# Patient Record
Sex: MALE | Race: WHITE | NOT HISPANIC OR LATINO | Employment: FULL TIME | URBAN - METROPOLITAN AREA
[De-identification: names, ages, dates, MRNs, and addresses within clinical notes are randomized per-mention and may not be internally consistent; named-entity substitution may affect disease eponyms.]

---

## 2019-03-30 ENCOUNTER — OFFICE VISIT (OUTPATIENT)
Dept: URGENT CARE | Facility: URGENT CARE | Age: 49
End: 2019-03-30
Payer: COMMERCIAL

## 2019-03-30 VITALS
SYSTOLIC BLOOD PRESSURE: 119 MMHG | OXYGEN SATURATION: 94 % | HEART RATE: 71 BPM | DIASTOLIC BLOOD PRESSURE: 83 MMHG | WEIGHT: 208.6 LBS | TEMPERATURE: 98.2 F | BODY MASS INDEX: 28.29 KG/M2

## 2019-03-30 DIAGNOSIS — H92.03 OTALGIA OF BOTH EARS: Primary | ICD-10-CM

## 2019-03-30 DIAGNOSIS — H57.89 EYE IRRITATION: ICD-10-CM

## 2019-03-30 PROCEDURE — 99203 OFFICE O/P NEW LOW 30 MIN: CPT | Performed by: INTERNAL MEDICINE

## 2019-03-30 RX ORDER — OLOPATADINE HYDROCHLORIDE 1 MG/ML
1 SOLUTION/ DROPS OPHTHALMIC 2 TIMES DAILY
Qty: 5 ML | Refills: 0 | Status: SHIPPED | OUTPATIENT
Start: 2019-03-30 | End: 2019-07-18

## 2019-03-30 RX ORDER — CETIRIZINE HYDROCHLORIDE 10 MG/1
10 TABLET ORAL DAILY
Qty: 30 TABLET | Refills: 0 | Status: SHIPPED | OUTPATIENT
Start: 2019-03-30 | End: 2019-08-28

## 2019-03-30 NOTE — PROGRESS NOTES
SUBJECTIVE:  Fortino Polo is an 48 year old male who presents for ears feeling full.  Started yesterday.  Typically that feeling comes from ear wax so he used his ear wax drops which helped.  Then early this morning both ears felt full and painful.  Also started having some eye watering.  Eyes are a little swollen.  No cough or runny nose.  About 9 days ago had some vomiting and diarrhea for a couple days, which resolved after about 2 days.  No recent travel.  No known exposures.  Tylenol helped pain for a while this morning.      PMH:   has a past medical history of Condyloma, Dyslipidemia, and Pilonidal cyst.  Patient Active Problem List   Diagnosis     NO ACTIVE PROBLEMS     CARDIOVASCULAR SCREENING; LDL GOAL LESS THAN 160     Social History     Socioeconomic History     Marital status:      Spouse name: Not on file     Number of children: Not on file     Years of education: Not on file     Highest education level: Not on file   Occupational History     Not on file   Social Needs     Financial resource strain: Not on file     Food insecurity:     Worry: Not on file     Inability: Not on file     Transportation needs:     Medical: Not on file     Non-medical: Not on file   Tobacco Use     Smoking status: Never Smoker     Smokeless tobacco: Former User   Substance and Sexual Activity     Alcohol use: Yes     Comment: weekends beer     Drug use: No     Sexual activity: Yes     Partners: Female   Lifestyle     Physical activity:     Days per week: Not on file     Minutes per session: Not on file     Stress: Not on file   Relationships     Social connections:     Talks on phone: Not on file     Gets together: Not on file     Attends Bahai service: Not on file     Active member of club or organization: Not on file     Attends meetings of clubs or organizations: Not on file     Relationship status: Not on file     Intimate partner violence:     Fear of current or ex partner: Not on file     Emotionally  abused: Not on file     Physically abused: Not on file     Forced sexual activity: Not on file   Other Topics Concern     Parent/sibling w/ CABG, MI or angioplasty before 65F 55M? Not Asked      Service No     Blood Transfusions No     Caffeine Concern No     Occupational Exposure No     Hobby Hazards No     Sleep Concern No     Stress Concern No     Weight Concern No     Special Diet No     Back Care No     Exercise Yes     Bike Helmet Yes     Seat Belt Yes     Self-Exams Yes   Social History Narrative     Not on file     Family History   Problem Relation Age of Onset     Hypertension Mother      Heart Disease Father      C.A.D. Father      Genitourinary Problems Sister         liver      Cerebrovascular Disease Father        ALLERGIES:  Nkda [no known drug allergies]    Current Outpatient Medications   Medication     NO ACTIVE MEDICATIONS     No current facility-administered medications for this visit.          ROS:  ROS is done and is negative for general/constitutional, eye, ENT, Respiratory, cardiovascular, GI, , Skin, musculoskeletal except as noted elsewhere.  All other review of systems negative except as noted elsewhere.      OBJECTIVE:  /83   Pulse 71   Temp 98.2  F (36.8  C) (Oral)   Wt 94.6 kg (208 lb 9.6 oz)   SpO2 94%   BMI 28.29 kg/m    GENERAL APPEARANCE: Alert, in no acute distress  EYES: normal.  Bilateral upper eyelids with trace edema, no erythema or tenderness or increased warmth.  EARS: External ears normal. Canals clear. TMs with moderate clear effusions, no erythema.  NOSE:normal  OROPHARYNX:normal  NECK:No adenopathy,masses or thyromegaly  RESP: normal and clear to auscultation  CV:regular rate and rhythm and no murmurs, clicks, or gallops  ABDOMEN: Abdomen soft, non-tender. BS normal. No masses, organomegaly  SKIN: no ulcers, lesions or rash  MUSCULOSKELETAL:Musculoskeletal normal      RESULTS  Results for orders placed or performed in visit on 10/29/13   Lipid panel  reflex to direct LDL   Result Value Ref Range    Cholesterol 216 (H) 0 - 200 mg/dL    Triglycerides 122 0 - 150 mg/dL    HDL Cholesterol 32 (L) 40 - 110 mg/dL    LDL Cholesterol Calculated 159 (H) 0 - 129 mg/dL    VLDL-Cholesterol 24 0 - 30 mg/dL    Cholesterol/HDL Ratio 6.7 (H) 0.0 - 5.0   Basic metabolic panel   Result Value Ref Range    Sodium 141 133 - 144 mmol/L    Potassium 4.4 3.4 - 5.3 mmol/L    Chloride 100 94 - 109 mmol/L    Carbon Dioxide 28 20 - 32 mmol/L    Anion Gap 12 6 - 17 mmol/L    Glucose 97 60 - 99 mg/dL    Urea Nitrogen 12 5 - 24 mg/dL    Creatinine 1.35 (H) 0.66 - 1.25 mg/dL    GFR Estimate 58 (L) >60 mL/min/1.7m2    GFR Estimate If Black 70 >60 mL/min/1.7m2    Calcium 9.5 8.5 - 10.4 mg/dL   .  No results found for this or any previous visit (from the past 48 hour(s)).    ASSESSMENT/PLAN:    ASSESSMENT / PLAN:  (H92.03) Otalgia of both ears  (primary encounter diagnosis)  Comment: currently appears to be due to some increased fluid; eg eustacian tube dysfunction or allergies or viral etiology  Plan: cetirizine (ZYRTEC) 10 MG tablet        Reviewed medication instructions and side effects. Follow up if experiences side effects.. Also may use an otc decongestant prn.  I reviewed supportive care, otc meds to use if needed, expected course, and signs of concern.  Follow up as needed or if he does not improve within 7 day(s) or if worsens in any way.  Reviewed red flag symptoms and is to go to the ER if experiences any of these.    (H57.89) Eye irritation  Comment: suspect some allergies  Plan: olopatadine (PATANOL) 0.1 % ophthalmic solution        Reviewed medication instructions and side effects. Follow up if experiences side effects.. I reviewed supportive care, otc meds to use if needed, expected course, and signs of concern.  Follow up as needed or if he does not improve within 7 day(s) or if worsens in any way.  Reviewed red flag symptoms and is to go to the ER if experiences any of  these.      See [facility] for orders, medications, letters, patient instructions    Charlotte Don M.D.

## 2019-04-08 ENCOUNTER — OFFICE VISIT (OUTPATIENT)
Dept: FAMILY MEDICINE | Facility: CLINIC | Age: 49
End: 2019-04-08
Payer: COMMERCIAL

## 2019-04-08 VITALS
TEMPERATURE: 97.9 F | SYSTOLIC BLOOD PRESSURE: 128 MMHG | WEIGHT: 210 LBS | RESPIRATION RATE: 16 BRPM | BODY MASS INDEX: 28.48 KG/M2 | DIASTOLIC BLOOD PRESSURE: 83 MMHG | HEART RATE: 82 BPM | OXYGEN SATURATION: 97 %

## 2019-04-08 DIAGNOSIS — H66.002 ACUTE SUPPURATIVE OTITIS MEDIA OF LEFT EAR WITHOUT SPONTANEOUS RUPTURE OF TYMPANIC MEMBRANE, RECURRENCE NOT SPECIFIED: Primary | ICD-10-CM

## 2019-04-08 DIAGNOSIS — J30.2 SEASONAL ALLERGIC RHINITIS, UNSPECIFIED TRIGGER: ICD-10-CM

## 2019-04-08 PROCEDURE — 99214 OFFICE O/P EST MOD 30 MIN: CPT | Performed by: NURSE PRACTITIONER

## 2019-04-08 RX ORDER — FLUTICASONE PROPIONATE 50 MCG
1 SPRAY, SUSPENSION (ML) NASAL DAILY
Qty: 9.9 ML | Refills: 0 | Status: SHIPPED | OUTPATIENT
Start: 2019-04-08 | End: 2019-07-18

## 2019-04-08 ASSESSMENT — PAIN SCALES - GENERAL: PAINLEVEL: MILD PAIN (2)

## 2019-04-08 NOTE — PROGRESS NOTES
SUBJECTIVE:                                                    Fortino Polo is a 48 year old male who presents to clinic today for the following health issues:    RESPIRATORY SYMPTOMS      Duration: 1 week    Description  ear pain bilateral PLUGGED, left ear pain and drainage now new sx    Severity: mild-moderate    Accompanying signs and symptoms: None    History (predisposing factors):  none    Precipitating or alleviating factors: None    Therapies tried and outcome:  antihistamine  Was seen when this started 3/30 told not infected and to use sudafed but that is not helping  Not having a lot of sinus sx anymore just clear thin rhinorrhea has allergies    Problem list and histories reviewed & adjusted, as indicated.  Additional history: as documented      ROS:  Constitutional, HEENT, cardiovascular, pulmonary, gi and gu systems are negative, except as otherwise noted.    OBJECTIVE:     /83   Pulse 82   Temp 97.9  F (36.6  C) (Oral)   Resp 16   Wt 95.3 kg (210 lb)   SpO2 97%   BMI 28.48 kg/m    Body mass index is 28.48 kg/m .  GENERAL:  alert and no distress  EYES: Eyes grossly normal to inspection, PERRL and conjunctivae and sclerae normal  HENT: Right ear canals and TM's normal, left ear erythematous bulging, effusion  nose and mouth without ulcers or lesions  NECK: no adenopathy, no asymmetry, masses, or scars and thyroid normal to palpation  RESP: lungs clear to auscultation - no rales, rhonchi or wheezes  CV: regular rate and rhythm, normal S1 S2, no S3 or S4, no murmur, click or rub, no peripheral edema and peripheral pulses strong  SKIN: no suspicious lesions or rashes  PSYCH: mentation appears normal, affect normal/bright      ASSESSMENT/PLAN:       '(H66.002) Acute suppurative otitis media of left ear without spontaneous rupture of tympanic membrane, recurrence not specified  (primary encounter diagnosis)    Plan:  amoxicillin-clavulanate (AUGMENTIN) 875-125 MG  Tablet BID X 10  days  Ibuprofen or tylenol for pain      (J30.2) Seasonal allergic rhinitis, unspecified trigger  Plan: continue zyrtec  fluticasone (FLONASE) 50 MCG/ACT nasal spray,          Monitor symptoms, call or rtc if worsening or not improving    NEELAM Arias [facility]

## 2019-07-18 ENCOUNTER — OFFICE VISIT (OUTPATIENT)
Dept: FAMILY MEDICINE | Facility: CLINIC | Age: 49
End: 2019-07-18
Payer: COMMERCIAL

## 2019-07-18 ENCOUNTER — TELEPHONE (OUTPATIENT)
Dept: FAMILY MEDICINE | Facility: CLINIC | Age: 49
End: 2019-07-18

## 2019-07-18 ENCOUNTER — ANCILLARY PROCEDURE (OUTPATIENT)
Dept: GENERAL RADIOLOGY | Facility: CLINIC | Age: 49
End: 2019-07-18
Attending: PHYSICIAN ASSISTANT
Payer: COMMERCIAL

## 2019-07-18 VITALS
RESPIRATION RATE: 12 BRPM | DIASTOLIC BLOOD PRESSURE: 76 MMHG | OXYGEN SATURATION: 94 % | WEIGHT: 213 LBS | BODY MASS INDEX: 28.85 KG/M2 | HEIGHT: 72 IN | TEMPERATURE: 98.1 F | SYSTOLIC BLOOD PRESSURE: 122 MMHG | HEART RATE: 95 BPM

## 2019-07-18 DIAGNOSIS — Z11.4 SCREENING FOR HIV (HUMAN IMMUNODEFICIENCY VIRUS): ICD-10-CM

## 2019-07-18 DIAGNOSIS — R53.83 FATIGUE, UNSPECIFIED TYPE: ICD-10-CM

## 2019-07-18 DIAGNOSIS — R10.84 ABDOMINAL PAIN, GENERALIZED: ICD-10-CM

## 2019-07-18 DIAGNOSIS — J18.9 PNEUMONIA OF RIGHT MIDDLE LOBE DUE TO INFECTIOUS ORGANISM: ICD-10-CM

## 2019-07-18 DIAGNOSIS — R10.9 FLANK PAIN: Primary | ICD-10-CM

## 2019-07-18 DIAGNOSIS — R05.9 COUGH: ICD-10-CM

## 2019-07-18 DIAGNOSIS — Z13.220 SCREENING FOR HYPERLIPIDEMIA: ICD-10-CM

## 2019-07-18 LAB
ALBUMIN SERPL-MCNC: 3.5 G/DL (ref 3.4–5)
ALBUMIN UR-MCNC: NEGATIVE MG/DL
ALP SERPL-CCNC: 61 U/L (ref 40–150)
ALT SERPL W P-5'-P-CCNC: 43 U/L (ref 0–70)
ANION GAP SERPL CALCULATED.3IONS-SCNC: 7 MMOL/L (ref 3–14)
APPEARANCE UR: CLEAR
AST SERPL W P-5'-P-CCNC: 30 U/L (ref 0–45)
BASOPHILS # BLD AUTO: 0 10E9/L (ref 0–0.2)
BASOPHILS NFR BLD AUTO: 0.6 %
BILIRUB SERPL-MCNC: 0.3 MG/DL (ref 0.2–1.3)
BILIRUB UR QL STRIP: NEGATIVE
BUN SERPL-MCNC: 10 MG/DL (ref 7–30)
CALCIUM SERPL-MCNC: 9 MG/DL (ref 8.5–10.1)
CHLORIDE SERPL-SCNC: 100 MMOL/L (ref 94–109)
CHOLEST SERPL-MCNC: 201 MG/DL
CO2 SERPL-SCNC: 27 MMOL/L (ref 20–32)
COLOR UR AUTO: YELLOW
CREAT SERPL-MCNC: 1.13 MG/DL (ref 0.66–1.25)
DIFFERENTIAL METHOD BLD: ABNORMAL
EOSINOPHIL # BLD AUTO: 0.1 10E9/L (ref 0–0.7)
EOSINOPHIL NFR BLD AUTO: 1.1 %
ERYTHROCYTE [DISTWIDTH] IN BLOOD BY AUTOMATED COUNT: 12.4 % (ref 10–15)
GFR SERPL CREATININE-BSD FRML MDRD: 76 ML/MIN/{1.73_M2}
GLUCOSE SERPL-MCNC: 99 MG/DL (ref 70–99)
GLUCOSE UR STRIP-MCNC: NEGATIVE MG/DL
HCT VFR BLD AUTO: 36.9 % (ref 40–53)
HDLC SERPL-MCNC: 32 MG/DL
HETEROPH AB SER QL: NEGATIVE
HGB BLD-MCNC: 12.6 G/DL (ref 13.3–17.7)
HGB UR QL STRIP: ABNORMAL
HIV 1+2 AB+HIV1 P24 AG SERPL QL IA: NONREACTIVE
KETONES UR STRIP-MCNC: NEGATIVE MG/DL
LDLC SERPL CALC-MCNC: 149 MG/DL
LEUKOCYTE ESTERASE UR QL STRIP: NEGATIVE
LIPASE SERPL-CCNC: 68 U/L (ref 73–393)
LYMPHOCYTES # BLD AUTO: 1.4 10E9/L (ref 0.8–5.3)
LYMPHOCYTES NFR BLD AUTO: 19 %
MCH RBC QN AUTO: 31.7 PG (ref 26.5–33)
MCHC RBC AUTO-ENTMCNC: 34.1 G/DL (ref 31.5–36.5)
MCV RBC AUTO: 93 FL (ref 78–100)
MONOCYTES # BLD AUTO: 1 10E9/L (ref 0–1.3)
MONOCYTES NFR BLD AUTO: 13.5 %
NEUTROPHILS # BLD AUTO: 4.7 10E9/L (ref 1.6–8.3)
NEUTROPHILS NFR BLD AUTO: 65.8 %
NITRATE UR QL: NEGATIVE
NONHDLC SERPL-MCNC: 169 MG/DL
PH UR STRIP: 7 PH (ref 5–7)
PLATELET # BLD AUTO: 271 10E9/L (ref 150–450)
POTASSIUM SERPL-SCNC: 3.9 MMOL/L (ref 3.4–5.3)
PROT SERPL-MCNC: 7.9 G/DL (ref 6.8–8.8)
RADIOLOGIST FLAGS: ABNORMAL
RBC # BLD AUTO: 3.98 10E12/L (ref 4.4–5.9)
RBC #/AREA URNS AUTO: NORMAL /HPF
SODIUM SERPL-SCNC: 134 MMOL/L (ref 133–144)
SOURCE: ABNORMAL
SP GR UR STRIP: 1.01 (ref 1–1.03)
TRIGL SERPL-MCNC: 100 MG/DL
TSH SERPL DL<=0.005 MIU/L-ACNC: 0.77 MU/L (ref 0.4–4)
UROBILINOGEN UR STRIP-ACNC: 0.2 EU/DL (ref 0.2–1)
WBC # BLD AUTO: 7.1 10E9/L (ref 4–11)
WBC #/AREA URNS AUTO: NORMAL /HPF

## 2019-07-18 PROCEDURE — 81001 URINALYSIS AUTO W/SCOPE: CPT | Performed by: PHYSICIAN ASSISTANT

## 2019-07-18 PROCEDURE — 36415 COLL VENOUS BLD VENIPUNCTURE: CPT | Performed by: PHYSICIAN ASSISTANT

## 2019-07-18 PROCEDURE — 85025 COMPLETE CBC W/AUTO DIFF WBC: CPT | Performed by: PHYSICIAN ASSISTANT

## 2019-07-18 PROCEDURE — 71046 X-RAY EXAM CHEST 2 VIEWS: CPT

## 2019-07-18 PROCEDURE — 83690 ASSAY OF LIPASE: CPT | Performed by: PHYSICIAN ASSISTANT

## 2019-07-18 PROCEDURE — 87389 HIV-1 AG W/HIV-1&-2 AB AG IA: CPT | Performed by: PHYSICIAN ASSISTANT

## 2019-07-18 PROCEDURE — 99214 OFFICE O/P EST MOD 30 MIN: CPT | Performed by: PHYSICIAN ASSISTANT

## 2019-07-18 PROCEDURE — 84443 ASSAY THYROID STIM HORMONE: CPT | Performed by: PHYSICIAN ASSISTANT

## 2019-07-18 PROCEDURE — 80053 COMPREHEN METABOLIC PANEL: CPT | Performed by: PHYSICIAN ASSISTANT

## 2019-07-18 PROCEDURE — 86308 HETEROPHILE ANTIBODY SCREEN: CPT | Performed by: PHYSICIAN ASSISTANT

## 2019-07-18 PROCEDURE — 86618 LYME DISEASE ANTIBODY: CPT | Performed by: PHYSICIAN ASSISTANT

## 2019-07-18 PROCEDURE — 80061 LIPID PANEL: CPT | Performed by: PHYSICIAN ASSISTANT

## 2019-07-18 RX ORDER — AZITHROMYCIN 250 MG/1
TABLET, FILM COATED ORAL
Qty: 6 TABLET | Refills: 0 | Status: SHIPPED | OUTPATIENT
Start: 2019-07-18 | End: 2019-08-28

## 2019-07-18 ASSESSMENT — MIFFLIN-ST. JEOR: SCORE: 1874.16

## 2019-07-18 ASSESSMENT — PAIN SCALES - GENERAL: PAINLEVEL: NO PAIN (0)

## 2019-07-18 NOTE — LETTER
July 22, 2019      Fortino Polo  [address] [address] [facility] [id number]              Dear Fortino Polo      Your LDL (bad cholesterol) was above goal.  Genetics, diet, weight and low exercise levels can contribute to this. Elevated LDL cholesterol and triglycerides as well as low HDL cholesterol all increase a person's risk for heart and vascular disease. You need to recheck fasting labs yearly. Maintaining a healthy diet with lean proteins, whole grains and healthy fats such as olive oil as well as regular exercise and maintaining an appropriate weight all contribute to healthier cholesterol levels.    The rest of your labs were normal.       Enclosed is a copy of the results.  It was a pleasure to see you at your last appointment.    If you have any questions or concerns, please call myself or my nurse at [phone].      Sincerely,      Ramone Ceja/KIRSTIE Ji MA  [employer]      Results for orders placed or performed in visit on 07/18/19   HIV Screening   Result Value Ref Range    HIV Antigen Antibody Combo Nonreactive NR^Nonreactive       Lipid panel reflex to direct LDL Fasting   Result Value Ref Range    Cholesterol 201 (H) <200 mg/dL    Triglycerides 100 <150 mg/dL    HDL Cholesterol 32 (L) >39 mg/dL    LDL Cholesterol Calculated 149 (H) <100 mg/dL    Non HDL Cholesterol 169 (H) <130 mg/dL   CBC with platelets differential   Result Value Ref Range    WBC 7.1 4.0 - 11.0 10e9/L    RBC Count 3.98 (L) 4.4 - 5.9 10e12/L    Hemoglobin 12.6 (L) 13.3 - 17.7 g/dL    Hematocrit 36.9 (L) 40.0 - 53.0 %    MCV 93 78 - 100 fl    MCH 31.7 26.5 - 33.0 pg    MCHC 34.1 31.5 - 36.5 g/dL    RDW 12.4 10.0 - 15.0 %    Platelet Count 271 150 - 450 10e9/L    % Neutrophils 65.8 %    % Lymphocytes 19.0 %    % Monocytes 13.5 %    % Eosinophils 1.1 %    % Basophils 0.6 %    Absolute Neutrophil 4.7 1.6 - 8.3 10e9/L    Absolute Lymphocytes 1.4 0.8 - 5.3 10e9/L    Absolute Monocytes 1.0 0.0 - 1.3 10e9/L    Absolute  Eosinophils 0.1 0.0 - 0.7 10e9/L    Absolute Basophils 0.0 0.0 - 0.2 10e9/L    Diff Method Automated Method    Comprehensive metabolic panel   Result Value Ref Range    Sodium 134 133 - 144 mmol/L    Potassium 3.9 3.4 - 5.3 mmol/L    Chloride 100 94 - 109 mmol/L    Carbon Dioxide 27 20 - 32 mmol/L    Anion Gap 7 3 - 14 mmol/L    Glucose 99 70 - 99 mg/dL    Urea Nitrogen 10 7 - 30 mg/dL    Creatinine 1.13 0.66 - 1.25 mg/dL    GFR Estimate 76 >60 mL/min/[1.73_m2]    GFR Estimate If Black 88 >60 mL/min/[1.73_m2]    Calcium 9.0 8.5 - 10.1 mg/dL    Bilirubin Total 0.3 0.2 - 1.3 mg/dL    Albumin 3.5 3.4 - 5.0 g/dL    Protein Total 7.9 6.8 - 8.8 g/dL    Alkaline Phosphatase 61 40 - 150 U/L    ALT 43 0 - 70 U/L    AST 30 0 - 45 U/L   TSH with free T4 reflex   Result Value Ref Range    TSH 0.77 0.40 - 4.00 mU/L   Lyme Disease Glendy with reflex to WB Serum   Result Value Ref Range    Lyme Disease Antibodies Serum 0.11 0.00 - 0.89   Mononucleosis screen   Result Value Ref Range    Mononucleosis Screen Negative NEG^Negative   Lipase   Result Value Ref Range    Lipase 68 (L) 73 - 393 U/L   UA reflex to Microscopic and Culture   Result Value Ref Range    Color Urine Yellow     Appearance Urine Clear     Glucose Urine Negative NEG^Negative mg/dL    Bilirubin Urine Negative NEG^Negative    Ketones Urine Negative NEG^Negative mg/dL    Specific Gravity Urine 1.010 1.003 - 1.035    Blood Urine Trace (A) NEG^Negative    pH Urine 7.0 5.0 - 7.0 pH    Protein Albumin Urine Negative NEG^Negative mg/dL    Urobilinogen Urine 0.2 0.2 - 1.0 EU/dL    Nitrite Urine Negative NEG^Negative    Leukocyte Esterase Urine Negative NEG^Negative    Source Midstream Urine    Urine Microscopic   Result Value Ref Range    WBC Urine 0 - 5 OTO5^0 - 5 /HPF    RBC Urine O - 2 OTO2^O - 2 /HPF                 RE: Fortino Polo   MRN: [id number]  : 1970  ENC DATE: 2019

## 2019-07-18 NOTE — TELEPHONE ENCOUNTER
Called and spoke with  Imaging to notify that this finding has been sent to provider to review and advise.    ANGELIQUE PurdyN, RN, PHN        Study Result     CHEST TWO VIEWS 7/18/2019 10:23 AM      HISTORY: Cough.     COMPARISON: None.      FINDINGS: Right upper lobe infiltrate. Remaining lungs clear. Trace  right pleural fluid. The cardiac silhouette is not enlarged. Pulmonary  vasculature is unremarkable.                                                                      IMPRESSION: Right upper lobe infiltrate.     [Access Center: See impression]     This report will be copied to the [facility] to ensure a  provider acknowledges the finding. Access Center is available Monday  through Friday 8am-3:30 pm.      CHUCK VILLAGRAN MD   Component   Radiologist flags Rad-Urgent Abnormal (Urgent)   See impression

## 2019-07-18 NOTE — PROGRESS NOTES
Subjective     Fortino Polo is a 48 year old male who presents to clinic today for the following health issues:    HPI   Back Pain       Duration:  went on for one week, started the day after lifting some coolers with pop in them but doesn't remember and specific injuries,  and then on and off til  then resolved the next day, getting chills sometimes the past couple days, Dry cough and SANTIAGO x 3 days        Specific cause: none    Description:   Location of pain: middle of back left, discomfort in left abdomen. Patient states like having the urge to have BM, but can't.  Pain mostly at night, + fatigue and decreased appettie  Character of pain: ache  Pain radiation:none  Fatigue: YES  Chills:YES  Fever: YES, 102.0 by ear  New numbness or weakness in legs, not attributed to pain:  no     Intensity: mild    History:   Pain interferes with job: No  History of back problems: no prior back problems  Any previous MRI or X-rays: None  Sees a specialist for back pain:  No  Therapies tried without relief: None.    Alleviating factors:   Improved by: ibuprofen      Precipitating factors:  Worsened by: certain movements    Weaker urinary stream as hard time bearing down    Son had a fever today       Allergies   Allergen Reactions     Nkda [No Known Drug Allergies]        Patient Active Problem List   Diagnosis     NO ACTIVE PROBLEMS     CARDIOVASCULAR SCREENING; LDL GOAL LESS THAN 160       Past Medical History:   Diagnosis Date     Condyloma      Dyslipidemia      Pilonidal cyst          Current Outpatient Medications on File Prior to Visit:  [] cetirizine (ZYRTEC) 10 MG tablet Take 1 tablet (10 mg) by mouth daily     No current facility-administered medications on file prior to visit.     Past Surgical History:   Procedure Laterality Date     DRAIN PILONIDAL CYST SIMPL       REMOVAL OF NAIL PLATE         Social History     Socioeconomic History     Marital status:      Spouse name: Not on file      Number of children: Not on file     Years of education: Not on file     Highest education level: Not on file   Occupational History     Not on file   Social Needs     Financial resource strain: Not on file     Food insecurity:     Worry: Not on file     Inability: Not on file     Transportation needs:     Medical: Not on file     Non-medical: Not on file   Tobacco Use     Smoking status: Never Smoker     Smokeless tobacco: Former User   Substance and Sexual Activity     Alcohol use: Yes     Comment: weekends beer     Drug use: No     Sexual activity: Yes     Partners: Female   Lifestyle     Physical activity:     Days per week: Not on file     Minutes per session: Not on file     Stress: Not on file   Relationships     Social connections:     Talks on phone: Not on file     Gets together: Not on file     Attends Restorationist service: Not on file     Active member of club or organization: Not on file     Attends meetings of clubs or organizations: Not on file     Relationship status: Not on file     Intimate partner violence:     Fear of current or ex partner: Not on file     Emotionally abused: Not on file     Physically abused: Not on file     Forced sexual activity: Not on file   Other Topics Concern     Parent/sibling w/ CABG, MI or angioplasty before 65F 55M? Not Asked      Service No     Blood Transfusions No     Caffeine Concern No     Occupational Exposure No     Hobby Hazards No     Sleep Concern No     Stress Concern No     Weight Concern No     Special Diet No     Back Care No     Exercise Yes     Bike Helmet Yes     Seat Belt Yes     Self-Exams Yes   Social History Narrative     Not on file       REVIEW OF SYSTEMS  General: negative for fever  Resp: negative for cough  CV: negative for chest pain  : negative for dysuria , incontinence, frequency  Musculoskeletal: as above  Neurologic: negative for ataxia, saddle anesthesia, fecal incontinence, one sided weakness,  Paresthesias  ABD: mild nausea, no  vomiting    Physical Exam:  Vitals: /76 (BP Location: Right arm, Patient Position: Chair, Cuff Size: Adult Large)   Pulse 95   Temp 98.1  F (36.7  C) (Oral)   Resp 12   Ht 1.829 m (6')   Wt 96.6 kg (213 lb)   SpO2 94%   BMI 28.89 kg/m    BMI= Body mass index is 28.89 kg/m .  Constitutional: healthy, alert and no acute distress   CARDIO: RRR, no MRG  RESP: lungs CTA, NAD  NEURO: Patellar reflexes intact and equal b/l  BACK:  Straight leg raise intact, No paraspinal muscle TTP, strength intact and equal b/l lower extremities  GAIT: intact  ABD:  Soft, nonttp 2+ bowel sounds, no CVAT  Psychiatric: mentation appears normal and affect normal/bright    My independent read of XR is RML PNA        Impression: well appearing. Back pain now resolved, having vague abd symptoms and systemic chills    ICD-10-CM    1. Flank pain R10.9 Comprehensive metabolic panel     UA reflex to Microscopic and Culture     Urine Microscopic   2. Pneumonia of right middle lobe due to infectious organism (H) J18.1 azithromycin (ZITHROMAX Z-ALFONSO) 250 MG tablet   3. Cough R05 XR Chest 2 Views   4. Abdominal pain, generalized R10.84 Lipase   5. Fatigue, unspecified type R53.83 CBC with platelets differential     TSH with free T4 reflex     Lyme Disease Glendy with reflex to WB Serum     Mononucleosis screen   6. Screening for hyperlipidemia Z13.220 Lipid panel reflex to direct LDL Fasting   7. Screening for HIV (human immunodeficiency virus) Z11.4 HIV Screening       Plan:  Instructions for back care and return precautions discussed.        Ramone Ceja PA-C

## 2019-07-18 NOTE — TELEPHONE ENCOUNTER
Reason for Call:  Other     Detailed comments: Imaging needs a urgent call back saying that we notified the provider of this finding.     Phone Number can be reached at:  Imaging [phone]    Best Time: any    Can we leave a detailed message on this number? YES    Call taken on 7/18/2019 at 12:24 PM by Muriel Tan

## 2019-07-18 NOTE — PATIENT INSTRUCTIONS
At [facility], we strive to deliver an exceptional experience to you, every time we see you.  If you receive a survey in the mail, please send us back your thoughts. We really do value your feedback.    Based on your medical history, these are the current health maintenance/preventive care services that you are due for (some may have been done at this visit.)  Health Maintenance Due   Topic Date Due     HIV SCREENING  09/28/1985     PREVENTIVE CARE VISIT  10/29/2014     LIPID  10/29/2018         Suggested websites for health information:  Www.[employer].org : Up to date and easily searchable information on multiple topics.  Www.medlineplus.gov : medication info, interactive tutorials, watch real surgeries online  Www.familydoctor.org : good info from the Academy of Family Physicians  Www.cdc.gov : public health info, travel advisories, epidemics (H1N1)  Www.aap.org : children's health info, normal development, vaccinations  Www.health.[facility].[place].us : [place] dept of health, public health issues in [place], N1N1    Your care team:                            Family Medicine Internal Medicine   MD David Reed MD Shantel Branch-Fleming, MD Katya Georgiev PA-C Nam Ho, MD Pediatrics   KIMBERLY Hood, KILEY Snell APRN CNP   MD Amina Worthington MD Deborah Mielke, MD Kim Thein, APRN [facility]      Clinic hours: Monday - Thursday 7 am-7 pm; Fridays 7 am-5 pm.   Urgent care: Monday - Friday 11 am-9 pm; Saturday and Sunday 9 am-5 pm.  Pharmacy : Monday -Thursday 8 am-8 pm; Friday 8 am-6 pm; Saturday and Sunday 9 am-5 pm.     Clinic: [phone]   Pharmacy: [phone]    Patient Education     Treating Pneumonia  Pneumonia is an infection of one or both of the lungs. Pneumonia:    Is usually caused by either a virus or a bacteria    Can be very serious, especially in infants, young children, and older adults. It s also serious for those with other  long-term health problems or weakened immune systems.    Is sometimes treated at home and sometimes in the hospital    Antibiotic medicines  Antibiotics may be prescribed for pneumonia caused by bacteria. They may be pills (oral medicines), or shots (injections). Or they may be given by IV (intravenously) into a vein. If you are taking oral medicines at home:    Fill your prescription and start taking your medicine as soon as you can.    You will likely start to feel better in a day or 2, but don t stop taking the antibiotic.    Use a pill organizer to help you remember to take your medicine.    Let your healthcare provider know if you have side effects.    Take your medicine exactly as directed on the label. Talk to your provider or pharmacist if you have any questions.  Antiviral medicines  Antiviral medicine may be prescribed for pneumonia caused by a virus. For example, antiviral medicine may be prescribed for pneumonia caused by the flu virus. Antibiotics do not work against viruses. If you are taking antiviral medicine at home:    Fill your prescription and start taking your medicine as soon as you can.    Talk with your provider or pharmacist about possible side effects.    Take the medicine exactly as instructed.  To relieve symptoms  There are many medicines that can help relieve symptoms of pneumonia. Some are prescription and some are over-the-counter.  Your healthcare provider may recommend:    Acetaminophen or ibuprofen to lower your fever and to lessen headache or other pain    Cough medicine to loosen mucus or to reduce coughing  Make sure you check with your healthcare provider or pharmacist before taking any over-the-counter medicines.  Special treatments  If you are hospitalized for pneumonia, you may have other therapies, including:    Inhaled medicines to help with breathing or chest congestion    Supplemental oxygen to increase low oxygen levels  Drink fluids and eat healthy  You should eat  healthy to help your body fight the infection. Drinking a lot of fluids helps to replace fluids lost from fever and to loosen mucus in your chest.    Diet. Make healthy food choices, including fruits and vegetables, lean meats and other proteins, 100% whole grain and low- or no-fat dairy products.    Fluids. Drink at least 6 to 8 tall glasses a day. Water and 100% fruit or vegetable juice are best.  Get plenty of rest and sleep  You may be more tired than usual for a while. It is important to get enough sleep at night. It s also important to rest during the day. Talk with your healthcare provider if coughing or other symptoms are interfering with your sleep.  Preventing the spread of germs  The best thing you can do to prevent spreading germs is to wash your hands often. You should:    Rub your hand with soap and water for 20 to 30 seconds.    Clean in between your fingers, the backs of your hands, and around your nails.    Dry your hands on a separate towel or use paper towels.  You should also:    Keep alcohol-based hand  nearby.    Make sure you also clean surfaces that you touch. Use a product that kills all types of germs.    Stay away from others until you are feeling better.  When to call your healthcare provider  Call your healthcare provider if you have any of the following:    Symptoms get worse    Fever continues    Shortness of breath gets worse    Increased mucus or mucus that is darker in color    Coughing gets worse    Lips or fingers are bluish in color    Side effects from your medicine   Date Last Reviewed: 12/1/2016 2000-2018 [employer]. [address] [place] [address], [place] [address]. All rights reserved. This information is not intended as a substitute for professional medical care. Always follow your healthcare professional's instructions.

## 2019-07-19 LAB — B BURGDOR IGG+IGM SER QL: 0.11 (ref 0–0.89)

## 2019-08-28 ENCOUNTER — OFFICE VISIT (OUTPATIENT)
Dept: FAMILY MEDICINE | Facility: CLINIC | Age: 49
End: 2019-08-28
Payer: COMMERCIAL

## 2019-08-28 ENCOUNTER — ANCILLARY PROCEDURE (OUTPATIENT)
Dept: GENERAL RADIOLOGY | Facility: CLINIC | Age: 49
End: 2019-08-28
Attending: FAMILY MEDICINE
Payer: COMMERCIAL

## 2019-08-28 VITALS
WEIGHT: 215 LBS | BODY MASS INDEX: 29.16 KG/M2 | HEART RATE: 88 BPM | TEMPERATURE: 97.9 F | DIASTOLIC BLOOD PRESSURE: 91 MMHG | SYSTOLIC BLOOD PRESSURE: 143 MMHG | OXYGEN SATURATION: 95 %

## 2019-08-28 DIAGNOSIS — J18.9 PNEUMONIA OF RIGHT MIDDLE LOBE DUE TO INFECTIOUS ORGANISM: Primary | ICD-10-CM

## 2019-08-28 PROCEDURE — 99213 OFFICE O/P EST LOW 20 MIN: CPT | Performed by: FAMILY MEDICINE

## 2019-08-28 PROCEDURE — 71046 X-RAY EXAM CHEST 2 VIEWS: CPT

## 2019-08-28 NOTE — PATIENT INSTRUCTIONS
1. Take over the counter Zyrtec 10 mg daily as needed - or similar.    2. If your symptoms continue in the next 1-2 weeks, send a My Chart message. If you need help logging in to My Chart, please call [phone].    3. Come back in a couple of months for a physical with fasting labs.

## 2019-08-28 NOTE — PROGRESS NOTES
HPI:    Fortino Polo is an 48 year old male who presents for evaluation of:    Pneumonia follow-up - initial symptoms started early July 2019. Symptoms were cough productive of non bloody sputum, shortness of breath, fevers, chills. After treatment these have resolved but now has nasal congestion, dry cough without fevers or shortness of breath. In general he does not feel sick. No previous dx of asthma or COPD. The patient is a non smoker.  Evaluation and treatment:    He was seen in clinic 7/18/19 - CXR showed large RUL infiltrate.   He was treated with Zpak.   Repeat CXR shows resolution - see below.   I explained he does not need further treatment with abx but over the counter allergy meds can be used.   We discussed symptoms that would warrant repeat clinic visit or ED visit.    CHEST TWO VIEWS August 28, 2019 1:41 PM      HISTORY: Pneumonia of right middle lobe due to infectious organism  (H).     COMPARISON: July 18, 2018.      FINDINGS: Nearly resolved right midlung infiltrate. No new  infiltrates. The cardiac silhouette is not enlarged. Pulmonary  vasculature is unremarkable.                                                                       IMPRESSION: Nearly resolved right-sided infiltrate.     CHUCK VILLAGRAN MD    ROS:    Per HPI    Exam:    BP (!) 143/91 (Cuff Size: Adult Large)   Pulse 88   Temp 97.9  F (36.6  C) (Oral)   Wt 97.5 kg (215 lb)   SpO2 95%   BMI 29.16 kg/m      Gen: Healthy appearing male in no acute distress  ENT: TM's normal. Oropharynx normal. Oral mucosa moist without lesions.  Eyes: Conjunctiva and sclera normal. Pupils react normally to light. No nystagmus.  Neck: No enlarged lymph nodes, thyromegally or other masses.  Lungs: Good air movement and otherwise clear.  CV: Heart RRR with no murmurs. No JVD, carotid bruits or leg edema.    Assessment and Plan - Decision Making    1. Pneumonia of right middle lobe due to infectious organism (H)    Per HPI    - XR Chest 2  Views      Written instructions given as follows:    Patient Instructions   1. Take over the counter Zyrtec 10 mg daily as needed - or similar.    2. If your symptoms continue in the next 1-2 weeks, send a My Chart message. If you need help logging in to My Chart, please call [phone].    3. Come back in a couple of months for a physical with fasting labs.

## 2019-09-26 NOTE — PROGRESS NOTES
"  SUBJECTIVE:   CC: Fortino Polo is an 48 year old male who presents for preventive health visit.     Healthy Habits:    Do you get at least three servings of calcium containing foods daily (dairy, green leafy vegetables, etc.)? { :794469::\"yes\"}    Amount of exercise or daily activities, outside of work: { :284702}    Problems taking medications regularly { :476819::\"No\"}    Medication side effects: { :202143::\"No\"}    Have you had an eye exam in the past two years? { :891022}    Do you see a dentist twice per year? { :248940}    Do you have sleep apnea, excessive snoring or daytime drowsiness?{ :975607}  {Outside tests to abstract? :960406}    {additional problems to add (Optional):883773}    Today's PHQ-2 Score:   PHQ-2 ( 1999 Pfizer) 7/18/2019 4/8/2019   Q1: Little interest or pleasure in doing things - 0   Q2: Feeling down, depressed or hopeless 1 0   PHQ-2 Score - 0     {PHQ-2 LOOK IN ASSESSMENTS (Optional) :062272}  Abuse: Current or Past(Physical, Sexual or Emotional)- {YES/NO/NA:553425}  Do you feel safe in your environment? {YES/NO/NA:311177}    Social History     Tobacco Use     Smoking status: Never Smoker     Smokeless tobacco: Former User   Substance Use Topics     Alcohol use: Yes     Comment: weekends beer     If you drink alcohol do you typically have >3 drinks per day or >7 drinks per week? {ETOH :279372}                      Last PSA: No results found for: PSA    Reviewed orders with patient. Reviewed health maintenance and updated orders accordingly - {Yes/No:130933::\"Yes\"}  {Chronicprobdata (Optional):457612}    Reviewed and updated as needed this visit by clinical staff         Reviewed and updated as needed this visit by Provider        {HISTORY OPTIONS (Optional):022038}    ROS:  { :436615::\"CONSTITUTIONAL: NEGATIVE for fever, chills, change in weight\",\"INTEGUMENTARY/SKIN: NEGATIVE for worrisome rashes, moles or lesions\",\"EYES: NEGATIVE for vision changes or irritation\",\"ENT: NEGATIVE for " "ear, mouth and throat problems\",\"RESP: NEGATIVE for significant cough or SOB\",\"CV: NEGATIVE for chest pain, palpitations or peripheral edema\",\"GI: NEGATIVE for nausea, abdominal pain, heartburn, or change in bowel habits\",\" male: negative for dysuria, hematuria, decreased urinary stream, erectile dysfunction, urethral discharge\",\"MUSCULOSKELETAL: NEGATIVE for significant arthralgias or myalgia\",\"NEURO: NEGATIVE for weakness, dizziness or paresthesias\",\"PSYCHIATRIC: NEGATIVE for changes in mood or affect\"}    OBJECTIVE:   There were no vitals taken for this visit.  EXAM:  {Exam Choices:079217}    {Diagnostic Test Results (Optional):157504::\"Diagnostic Test Results:\",\"Labs reviewed in Epic\"}    ASSESSMENT/PLAN:   {Diag Picklist:127073}    COUNSELING:  {MALE COUNSELING MESSAGES:492907::\"Reviewed preventive health counseling, as reflected in patient instructions\"}    Estimated body mass index is 29.16 kg/m  as calculated from the following:    Height as of 7/18/19: 1.829 m (6').    Weight as of 8/28/19: 97.5 kg (215 lb).    {Weight Management Plan (ACO) Complete if BMI is abnormal-  Ages 18-64  BMI >24.9.  Age 65+ with BMI <23 or >30 (Optional):942810}     reports that he has never smoked. He has quit using smokeless tobacco.  {Tobacco Cessation -- Complete if patient is a smoker (Optional):161352}    Counseling Resources:  ATP IV Guidelines  Pooled Cohorts Equation Calculator  FRAX Risk Assessment  ICSI Preventive Guidelines  Dietary Guidelines for Americans, 2010  USDA's MyPlate  ASA Prophylaxis  Lung CA Screening    Zbigniew Garcia PA-C  [facility]  "

## 2019-09-27 ENCOUNTER — OFFICE VISIT (OUTPATIENT)
Dept: FAMILY MEDICINE | Facility: CLINIC | Age: 49
End: 2019-09-27
Payer: COMMERCIAL

## 2019-09-27 VITALS
HEART RATE: 85 BPM | OXYGEN SATURATION: 97 % | WEIGHT: 217 LBS | RESPIRATION RATE: 16 BRPM | BODY MASS INDEX: 29.39 KG/M2 | DIASTOLIC BLOOD PRESSURE: 83 MMHG | SYSTOLIC BLOOD PRESSURE: 132 MMHG | HEIGHT: 72 IN

## 2019-09-27 DIAGNOSIS — J30.2 SEASONAL ALLERGIC RHINITIS, UNSPECIFIED TRIGGER: ICD-10-CM

## 2019-09-27 DIAGNOSIS — Z23 NEED FOR VACCINATION: ICD-10-CM

## 2019-09-27 DIAGNOSIS — Z00.00 ROUTINE GENERAL MEDICAL EXAMINATION AT A HEALTH CARE FACILITY: Primary | ICD-10-CM

## 2019-09-27 DIAGNOSIS — Z23 NEED FOR PROPHYLACTIC VACCINATION AND INOCULATION AGAINST INFLUENZA: ICD-10-CM

## 2019-09-27 PROCEDURE — 90472 IMMUNIZATION ADMIN EACH ADD: CPT | Performed by: PHYSICIAN ASSISTANT

## 2019-09-27 PROCEDURE — 99396 PREV VISIT EST AGE 40-64: CPT | Mod: 25 | Performed by: PHYSICIAN ASSISTANT

## 2019-09-27 PROCEDURE — 90471 IMMUNIZATION ADMIN: CPT | Performed by: PHYSICIAN ASSISTANT

## 2019-09-27 PROCEDURE — 90686 IIV4 VACC NO PRSV 0.5 ML IM: CPT | Performed by: PHYSICIAN ASSISTANT

## 2019-09-27 PROCEDURE — 90715 TDAP VACCINE 7 YRS/> IM: CPT | Performed by: PHYSICIAN ASSISTANT

## 2019-09-27 RX ORDER — CETIRIZINE HYDROCHLORIDE 10 MG/1
10 TABLET ORAL DAILY
COMMUNITY
End: 2021-01-08

## 2019-09-27 ASSESSMENT — ENCOUNTER SYMPTOMS
FREQUENCY: 0
CHILLS: 0
HEARTBURN: 0
NAUSEA: 0
DYSURIA: 0
ARTHRALGIAS: 0
JOINT SWELLING: 0
SORE THROAT: 0
HEADACHES: 0
DIZZINESS: 0
EYE PAIN: 0
CONSTIPATION: 0
ABDOMINAL PAIN: 0
NERVOUS/ANXIOUS: 0
DIARRHEA: 0
HEMATURIA: 0
PALPITATIONS: 0
FEVER: 0
MYALGIAS: 0
PARESTHESIAS: 0
COUGH: 1
HEMATOCHEZIA: 0

## 2019-09-27 ASSESSMENT — MIFFLIN-ST. JEOR: SCORE: 1892.31

## 2019-09-27 NOTE — PROGRESS NOTES
SUBJECTIVE:   CC: Fortino Polo is an 48 year old male who presents for preventative health visit.     Healthy Habits:     Getting at least 3 servings of Calcium per day:  Yes    Bi-annual eye exam:  NO    Dental care twice a year:  Yes    Sleep apnea or symptoms of sleep apnea:  None    Diet:  Regular (no restrictions)    Frequency of exercise:  2-3 days/week    Duration of exercise:  45-60 minutes    Taking medications regularly:  Yes    Medication side effects:  None    PHQ-2 Total Score: 0    Additional concerns today:  No      Pt was diagnosed with pneumonia in July - still having cough.  Was treated by nurse practitioner at his work for a sinus infection with amoxicillin for 10 days a.  He denies any chest pain or shortness of breath.  He states he thinks is more allergies this is just more congested runny nose it.  He has been taking over-the-counter antihistamines.  He has not been using Flonase.    Discuss getting shingles vaccine -he has had shingles this year and is questioning if he should get a shingles shot up.  I discussed with him that the complications for shingles shot is for ages 50 and older anti-double check with his insurance    Today's PHQ-2 Score:   PHQ-2 ( 1999 Pfizer) 9/27/2019   Q1: Little interest or pleasure in doing things 0   Q2: Feeling down, depressed or hopeless 0   PHQ-2 Score 0   Q1: Little interest or pleasure in doing things Not at all   Q2: Feeling down, depressed or hopeless Not at all   PHQ-2 Score 0       Abuse: Current or Past(Physical, Sexual or Emotional)- No  Do you feel safe in your environment? Yes    Social History     Tobacco Use     Smoking status: Never Smoker     Smokeless tobacco: Former User   Substance Use Topics     Alcohol use: Yes     Comment: weekends beer         Alcohol Use 9/27/2019   Prescreen: >3 drinks/day or >7 drinks/week? Yes   Prescreen: >3 drinks/day or >7 drinks/week? -   AUDIT SCORE  5       Last PSA: No results found for: PSA    Reviewed  orders with patient. Reviewed health maintenance and updated orders accordingly - Yes  Labs reviewed in EPIC  BP Readings from Last 3 Encounters:   19 132/83   19 (!) 143/91   19 122/76    Wt Readings from Last 3 Encounters:   19 98.4 kg (217 lb)   19 97.5 kg (215 lb)   19 96.6 kg (213 lb)                  Patient Active Problem List   Diagnosis     NO ACTIVE PROBLEMS     CARDIOVASCULAR SCREENING; LDL GOAL LESS THAN 160     BMI 29.0-29.9,adult     Past Surgical History:   Procedure Laterality Date     DRAIN PILONIDAL CYST SIMPL       REMOVAL OF NAIL PLATE         Social History     Tobacco Use     Smoking status: Never Smoker     Smokeless tobacco: Former User   Substance Use Topics     Alcohol use: Yes     Comment: weekends beer     Family History   Problem Relation Age of Onset     Hypertension Mother      Heart Disease Father      C.A.D. Father      Cerebrovascular Disease Father          81 possible pneumonia     Genitourinary Problems Sister         liver          Current Outpatient Medications   Medication Sig Dispense Refill     cetirizine (ZYRTEC) 10 MG tablet Take 10 mg by mouth daily       Allergies   Allergen Reactions     Nkda [No Known Drug Allergies]        Reviewed and updated as needed this visit by clinical staff  Tobacco  Allergies  Meds  Problems  Med Hx  Surg Hx  Fam Hx  Soc Hx          Reviewed and updated as needed this visit by Provider  Tobacco  Allergies  Meds  Problems  Med Hx  Surg Hx  Fam Hx          Past Medical History:   Diagnosis Date     Condyloma      Dyslipidemia      Pilonidal cyst       Past Surgical History:   Procedure Laterality Date     DRAIN PILONIDAL CYST SIMPL       REMOVAL OF NAIL PLATE         Review of Systems   Constitutional: Negative for chills and fever.   HENT: Positive for congestion. Negative for ear pain, hearing loss and sore throat.    Eyes: Negative for pain.   Respiratory: Positive for cough.     Cardiovascular: Negative for chest pain, palpitations and peripheral edema.   Gastrointestinal: Negative for abdominal pain, constipation, diarrhea, heartburn, hematochezia and nausea.   Genitourinary: Negative for dysuria, frequency, genital sores and hematuria.   Musculoskeletal: Negative for arthralgias, joint swelling and myalgias.   Neurological: Negative for dizziness, headaches and paresthesias.   Psychiatric/Behavioral: Negative for mood changes. The patient is not nervous/anxious.          OBJECTIVE:   /83   Pulse 85   Resp 16   Ht 1.829 m (6')   Wt 98.4 kg (217 lb)   SpO2 97%   BMI 29.43 kg/m      Physical Exam  GENERAL: healthy, alert and no distress  EYES: Eyes grossly normal to inspection, PERRL and conjunctivae and sclerae normal  HENT: ear canals and TM's normal, nose and mouth without ulcers or lesions  NECK: no adenopathy, no asymmetry, masses, or scars and thyroid normal to palpation  RESP: lungs clear to auscultation - no rales, rhonchi or wheezes  CV: regular rate and rhythm, normal S1 S2, no S3 or S4, no murmur, click or rub, no peripheral edema and peripheral pulses strong  ABDOMEN: soft, nontender, no hepatosplenomegaly, no masses and bowel sounds normal   (male): normal male genitalia without lesions or urethral discharge, no hernia  MS: no gross musculoskeletal defects noted, no edema  SKIN: no suspicious lesions or rashes  NEURO: Normal strength and tone, mentation intact and speech normal  PSYCH: mentation appears normal, affect normal/bright    Diagnostic Test Results:  Labs reviewed in Epic  Results for orders placed or performed in visit on 08/28/19   XR Chest 2 Views    Narrative    CHEST TWO VIEWS August 28, 2019 1:41 PM     HISTORY: Pneumonia of right middle lobe due to infectious organism  (H).    COMPARISON: July 18, 2018.     FINDINGS: Nearly resolved right midlung infiltrate. No new  infiltrates. The cardiac silhouette is not enlarged. Pulmonary  vasculature is  unremarkable.       Impression    IMPRESSION: Nearly resolved right-sided infiltrate.    CHUCK VILLAGRAN MD   labs from 7/19 reviewed.     ASSESSMENT/PLAN:       ICD-10-CM    1. Routine general medical examination at a health care facility Z00.00    2. Seasonal allergic rhinitis, unspecified trigger J30.2    3. BMI 29.0-29.9,adult Z68.29    1.  Work on diet and exercise.  Recheck yearly.  2.  Discussed continued over-the-counter antihistamines and adding Flonase nasal spray.  Follow-up in 2 to 4 weeks if continues to have problems.    COUNSELING:   Reviewed preventive health counseling, as reflected in patient instructions       Regular exercise       Healthy diet/nutrition    Estimated body mass index is 29.43 kg/m  as calculated from the following:    Height as of this encounter: 1.829 m (6').    Weight as of this encounter: 98.4 kg (217 lb).     Weight management plan: Discussed healthy diet and exercise guidelines     reports that he has never smoked. He has quit using smokeless tobacco.    Counseling Resources:  ATP IV Guidelines  Pooled Cohorts Equation Calculator  FRAX Risk Assessment  ICSI Preventive Guidelines  Dietary Guidelines for Americans, 2010  USDA's MyPlate  ASA Prophylaxis  Lung CA Screening    The 10-year ASCVD risk score (Eloy THORNE Jr., et al., 2013) is: 4.9%    Values used to calculate the score:      Age: 48 years      Sex: Male      Is Non- : No      Diabetic: No      Tobacco smoker: No      Systolic Blood Pressure: 132 mmHg      Is BP treated: No      HDL Cholesterol: 32 mg/dL      Total Cholesterol: 201 mg/dL     Zbigniew Garcia PA-C  [facility]

## 2019-09-27 NOTE — NURSING NOTE
Prior to immunization administration, verified patients identity using patient s name and date of birth. Please see Immunization Activity for additional information.     Screening Questionnaire for Adult Immunization    Are you sick today?   No   Do you have allergies to medications, food, a vaccine component or latex?   No   Have you ever had a serious reaction after receiving a vaccination?   No   Do you have a long-term health problem with heart disease, lung disease, asthma, kidney disease, metabolic disease (e.g. diabetes), anemia, or other blood disorder?   No   Do you have cancer, leukemia, HIV/AIDS, or any other immune system problem?   No   In the past 3 months, have you taken medications that affect  your immune system, such as prednisone, other steroids, or anticancer drugs; drugs for the treatment of rheumatoid arthritis, Crohn s disease, or psoriasis; or have you had radiation treatments?   No   Have you had a seizure, or a brain or other nervous system problem?   No   During the past year, have you received a transfusion of blood or blood     products, or been given immune (gamma) globulin or antiviral drug?   No   For women: Are you pregnant or is there a chance you could become        pregnant during the next month?   No   Have you received any vaccinations in the past 4 weeks?   No     Immunization questionnaire answers were all negative.        Per orders of [facility], injection of flu shot and tetanus given by Omaira Kinsey CMA. Patient instructed to remain in clinic for 15 minutes afterwards, and to report any adverse reaction to me immediately.       Screening performed by Omaira Kinsey CMA on 9/27/2019 at 9:37 AM.

## 2020-12-12 ENCOUNTER — HEALTH MAINTENANCE LETTER (OUTPATIENT)
Age: 50
End: 2020-12-12

## 2021-01-08 ENCOUNTER — OFFICE VISIT (OUTPATIENT)
Dept: FAMILY MEDICINE | Facility: CLINIC | Age: 51
End: 2021-01-08
Payer: COMMERCIAL

## 2021-01-08 VITALS
OXYGEN SATURATION: 94 % | HEART RATE: 88 BPM | BODY MASS INDEX: 29.84 KG/M2 | DIASTOLIC BLOOD PRESSURE: 89 MMHG | WEIGHT: 220 LBS | SYSTOLIC BLOOD PRESSURE: 139 MMHG

## 2021-01-08 DIAGNOSIS — Z23 NEED FOR PROPHYLACTIC VACCINATION AND INOCULATION AGAINST INFLUENZA: ICD-10-CM

## 2021-01-08 DIAGNOSIS — H01.001 BLEPHARITIS OF RIGHT UPPER EYELID, UNSPECIFIED TYPE: Primary | ICD-10-CM

## 2021-01-08 PROCEDURE — 90471 IMMUNIZATION ADMIN: CPT | Performed by: NURSE PRACTITIONER

## 2021-01-08 PROCEDURE — 90682 RIV4 VACC RECOMBINANT DNA IM: CPT | Performed by: NURSE PRACTITIONER

## 2021-01-08 PROCEDURE — 99213 OFFICE O/P EST LOW 20 MIN: CPT | Mod: 25 | Performed by: NURSE PRACTITIONER

## 2021-01-08 RX ORDER — ERYTHROMYCIN 5 MG/G
OINTMENT OPHTHALMIC
Qty: 3.5 G | Refills: 0 | Status: SHIPPED | OUTPATIENT
Start: 2021-01-08 | End: 2021-03-16

## 2021-01-08 NOTE — PROGRESS NOTES
Assessment & Plan     Blepharitis of right upper eyelid, unspecified type  Discussed warm compresses, wash with baby shampoo diluted in warm water, erythromycin ointment to lid margin at bedtime.  Return if worsening or not improving.   - erythromycin (ROMYCIN) 5 MG/GM ophthalmic ointment; Apply once daily to lid margin at bedtime    Need for prophylactic vaccination and inoculation against influenza  - INFLUENZA QUAD, RECOMBINANT, P-FREE (RIV4) (FLUBLOCK) [27200]      See Patient Instructions  Patient Instructions   Apply warm compresses to eye several times per day.     Apply erythromycin antibiotic ointment to eyelid margin once daily at bed time (okay if this gets in your eye).      Patient Education     Blepharitis    Blepharitis is an inflammation of the eyelid. It results in swelling of the eyelids, and it is often caused by a bacterial infection or a skin condition. Blepharitis is a common eye condition. There are two types. Anterior blepharitis occurs where the eyelashes are attached (outside front edge of the eyelid). Posterior blepharitis affects the inner edge of the eyelid that touches the eyeball.  In addition to swollen eyelids, blepharitis symptoms can include thick, yellow, dandruff-like scales that stick to the eyelid. There may be oily patches on the eyelid. The eyelashes may be crusted (with dandruff-like scales) when you wake up from sleeping. The irritated area may itch. The eyelids may be red. The eyes can be red and burn or sting. The eyes may tear a lot, or be dry. You can become sensitive to light or have blurred vision. Symptoms of blepharitis can cause irritability.  Blepharitis is a chronic condition and hard to cure. Even with successful treatment, recurrences are common. Good hygiene and home treatments (in the Home care section below) can improve your condition.  Causes  Causes of blepharitis may include:    Problems with the oil glands in the eyelid (meibomian glands)    Dandruff  of the scalp and eyebrows (seborrheic dermatitis)    Acne rosacea (a skin condition that causes redness of the face, and other symptoms)    Eyelash mites (tiny organisms in the eyelash follicles)    Allergic reactions to cosmetics or medicines  Home care  Medicine: The healthcare provider may prescribe an antibiotic eye drops or ointment, artificial tears, and/or steroid eye drops. Follow all instructions for using these medicines. Use all medicines as directed. If you have pain, take medicine as advised by the healthcare provider.    Wash your hands carefully with soap and warm water before and after caring for your eyes.    Apply a warm compress or a warm, moist washcloth to the eyelids for 1 minute, 2 to 3 times a day, to loosen the crust. Then, wipe away scales or crust from the eyelids.    After applying the warm compress, gently scrub the base of the eyelashes for almost 15 seconds per eyelid. To do this, close your eyes and use a moist eyelid cleansing wipe, clean washcloth, or cotton swab. Ask your healthcare provider about products (such as gentle baby shampoo) to use to help clean the eyelids.    You may be instructed to gently massage your eyelids to help unblock the eyelid glands. Follow all instructions given by the healthcare provider.    Unless told otherwise, on a regular basis, with eyes closed, clean your eyelids as directed by the healthcare provider. Blepharitis can be an ongoing problem.    Don't wear eye makeup until the inflammation goes away, or as directed by your healthcare provider.    Unless told otherwise, stop using contact lenses until you complete treatment for the condition.    Wash your hands regularly to help prevent dirt and bacteria from coming in contact with your eyelid.  Follow-up care  Follow up with your healthcare provider, or as advised. Your healthcare provider may refer you to an eye specialist (an optometrist or ophthalmologist) for further evaluation and  treatment.  When to seek medical advice  Call your healthcare provider right away if any of these occur:    Increase in redness of the white part of the eye    Increase in swelling, redness, irritation, or pain of the eyelids    Eye pain    Change in vision (trouble seeing or blurring)    Drainage (pus, blood) from the eyelid    Fever of 100.4 F (38 C) or higher, or as directed by your healthcare provider  [employer] last reviewed this educational content on 3/1/2018    1483-3128 [employer]. [address] [place] [address]. All rights reserved. This information is not intended as a substitute for professional medical care. Always follow your healthcare professional's instructions.               Return in about 2 weeks (around 1/22/2021) for If failure to improve.    Samantha Parker, [facility] ONEL Young is a 50 year old who presents to clinic today for the following health issues:    HPI     Right eyelid swelling and redness since Tuesday.  Seems to be worse in the morning.  Slightly uncomfortable, but not significantly painful.   Small amount of clear sticky discharge from eye, mostly in the morning.  Occasionally vision gets cloudy, he wipes his eye and this clears.  Sometimes eye feels scratchy.  No eye pain.  Denies any fevers, chills, or cold symptoms.       Review of Systems   Constitutional, HEENT, cardiovascular, pulmonary, gi and gu systems are negative, except as otherwise noted.      Objective    /89   Pulse 88   Wt 99.8 kg (220 lb)   SpO2 94%   BMI 29.84 kg/m    Body mass index is 29.84 kg/m .  Physical Exam   GENERAL: healthy, alert and no distress  EYES: Eyes grossly normal to inspection, PERRL and conjunctivae and sclerae normal.  Right upper eyelid is swollen and erythematous.  No scleral injection or obvious discharge from eye.     NECK: no adenopathy, no asymmetry, masses, or scars and thyroid normal to palpation  RESP:  lungs clear to auscultation - no rales, rhonchi or wheezes  CV: regular rate and rhythm, normal S1 S2, no S3 or S4, no murmur, click or rub, no peripheral edema and peripheral pulses strong  MS: no gross musculoskeletal defects noted, no edema

## 2021-02-05 ENCOUNTER — OFFICE VISIT (OUTPATIENT)
Dept: FAMILY MEDICINE | Facility: CLINIC | Age: 51
End: 2021-02-05
Payer: COMMERCIAL

## 2021-02-05 VITALS
HEART RATE: 79 BPM | BODY MASS INDEX: 29.53 KG/M2 | WEIGHT: 218 LBS | OXYGEN SATURATION: 96 % | DIASTOLIC BLOOD PRESSURE: 80 MMHG | SYSTOLIC BLOOD PRESSURE: 135 MMHG | TEMPERATURE: 97.5 F | RESPIRATION RATE: 16 BRPM | HEIGHT: 72 IN

## 2021-02-05 DIAGNOSIS — R09.81 NASAL CONGESTION: ICD-10-CM

## 2021-02-05 DIAGNOSIS — H93.11 TINNITUS, RIGHT: Primary | ICD-10-CM

## 2021-02-05 PROCEDURE — 99213 OFFICE O/P EST LOW 20 MIN: CPT | Performed by: PHYSICIAN ASSISTANT

## 2021-02-05 ASSESSMENT — MIFFLIN-ST. JEOR: SCORE: 1886.84

## 2021-02-05 NOTE — PROGRESS NOTES
"  Subjective     Hector is a 50 year old who presents to clinic today for the following health issues:    HPI       Concern - Ear problem  Onset: 1 month  Description: right ear ringing/buzzing and an odd feeling in the am  Intensity: moderate  Progression of Symptoms:  same  Accompanying Signs & Symptoms: sharp pains off and on for the last couple years   Previous history of similar problem:   Precipitating factors:        Worsened by: worse in the am   Alleviating factors:        Improved by: none  Therapies tried and outcome:     Pt presented to the clinic with concerns about his right ear. Has had 1 month of constant ringing, described as a medium pitched hum. Hears a \"whooshing\" sound after waking in the morning, also has nasal congestion in the morning. 3 year history of seasonal allergies with ear fullness and congestion. Zyrtec helps. 2 year history of random sharp pains in right ear canal; not occurring as often since ringing started. Denies decreased hearing, vision changes, dizziness, previous ear injuries/infections, Q-tip use, recent illness or travel.    PMH, FH, SH reviewed.    Review of Systems   Constitutional, HEENT, cardiovascular, pulmonary, gi and gu systems are negative, except as otherwise noted.      Objective    /80   Pulse 79   Temp 97.5  F (36.4  C) (Tympanic)   Resp 16   Ht 1.829 m (6')   Wt 98.9 kg (218 lb)   SpO2 96%   BMI 29.57 kg/m    Body mass index is 29.57 kg/m .  Physical Exam   GENERAL: healthy, alert and no distress  EYES: Eyes grossly normal to inspection, PERRL and conjunctivae and sclerae normal  HENT: ear canals and TM's normal, moderate cerumen accumulation, nose and mouth without ulcers or lesions  NECK: no adenopathy, no asymmetry, masses, or scars and thyroid normal to palpation  RESP: lungs clear to auscultation - no rales, rhonchi or wheezes  CV: regular rate and rhythm, normal S1 S2, no S3 or S4, no murmur, click or rub, no peripheral edema and peripheral " pulses strong  PSYCH: mentation appears normal, affect normal/bright    No labs done this visit.    Assessment/Plan    ICD-10-CM    1. Tinnitus, right  H93.11    2. Nasal congestion  R09.81      1. Start daily OTC Flonase nasal spray for 2 weeks. See ENT specialist for further evaluation if symptoms do not begin to improve. Warning signs discussed.     KOURTNEY DominguezS  The student acted as a scribe and the encounter documented above was completely performed by myself and the documentation reflects the work I have performed today.    Zbigniew Garcia PA-C

## 2021-03-03 NOTE — PROGRESS NOTES
History of Present Illness - Fortino Polo is a very pleasant 50 year old male here to see me for the first time at the consult of Zbigniew Garcia for recurent RIGHT ear pain and tinnitus.    He had noticed for a long time that he would get momentary to mild tinnitus only in the RIGHT ear.  But back in January the tinnitus on the RIGHT would be constant.  He also noted occasional pains and fullness in the RIGHT ear and was told it could be eustachian tube dysfunction and allergies.    There has been no change in balance.  He reports that he has no history of ear disease or ear surgery.  No changes in vision.  No chemo or radiation therapy.    Past Medical History -   Patient Active Problem List   Diagnosis     NO ACTIVE PROBLEMS     CARDIOVASCULAR SCREENING; LDL GOAL LESS THAN 160     BMI 29.0-29.9,adult       Current Medications -   Current Outpatient Medications:      erythromycin (ROMYCIN) 5 MG/GM ophthalmic ointment, Apply once daily to lid margin at bedtime (Patient not taking: Reported on 2/5/2021), Disp: 3.5 g, Rfl: 0    Allergies -   Allergies   Allergen Reactions     Nkda [No Known Drug Allergies]        Social History -   Social History     Socioeconomic History     Marital status:      Spouse name: Not on file     Number of children: Not on file     Years of education: Not on file     Highest education level: Not on file   Occupational History     Not on file   Social Needs     Financial resource strain: Not on file     Food insecurity     Worry: Not on file     Inability: Not on file     Transportation needs     Medical: Not on file     Non-medical: Not on file   Tobacco Use     Smoking status: Never Smoker     Smokeless tobacco: Former User   Substance and Sexual Activity     Alcohol use: Yes     Comment: weekends beer     Drug use: No     Sexual activity: Yes     Partners: Female   Lifestyle     Physical activity     Days per week: Not on file     Minutes per session: Not on file     Stress: Not  on file   Relationships     Social connections     Talks on phone: Not on file     Gets together: Not on file     Attends [employer] service: Not on file     Active member of club or organization: Not on file     Attends meetings of clubs or organizations: Not on file     Relationship status: Not on file     Intimate partner violence     Fear of current or ex partner: Not on file     Emotionally abused: Not on file     Physically abused: Not on file     Forced sexual activity: Not on file   Other Topics Concern     Parent/sibling w/ CABG, MI or angioplasty before 65F 55M? Not Asked      Service No     Blood Transfusions No     Caffeine Concern No     Occupational Exposure No     Hobby Hazards No     Sleep Concern No     Stress Concern No     Weight Concern No     Special Diet No     Back Care No     Exercise Yes     Bike Helmet Yes     Seat Belt Yes     Self-Exams Yes   Social History Narrative     Not on file       Family History -   Family History   Problem Relation Age of Onset     Hypertension Mother      Heart Disease Father      C.A.D. Father      Cerebrovascular Disease Father          81 possible pneumonia     Genitourinary Problems Sister         liver        Review of Systems - As per HPI and PMHx, otherwise 10+ system review of the head and neck, and general constitution is negative.    Physical Exam  /82   Pulse 77   Resp 17   Ht 1.829 m (6')   Wt 98.4 kg (217 lb)   SpO2 95%   BMI 29.43 kg/m      General - The patient is well nourished and well developed, and appears to have good nutritional status.  Alert and oriented to person and place, answers questions and cooperates with examination appropriately.   Head and Face - Normocephalic and atraumatic, with no gross asymmetry noted of the contour of the facial features.  The facial nerve is intact, with strong symmetric movements.  Voice and Breathing - The patient was breathing comfortably without the use of accessory muscles. There  was no wheezing, stridor, or stertor.  The patients voice was clear and strong, and had appropriate pitch and quality.  Ears - The tympanic membranes are normal in appearance, bony landmarks are intact.  No retraction, perforation, or masses.  No fluid or purulence was seen in the external canal or the middle ear. No evidence of infection of the middle ear or external canal, cerumen was normal in appearance.  Eyes - Extraocular movements intact, and the pupils were reactive to light.  Sclera were not icteric or injected, conjunctiva were pink and moist.      Audiologic Studies - An audiogram and tympanogram were performed today as part of the evaluation and personally reviewed. The tympanogram shows a normal Type A curve, with normal canal volume and middle ear pressure.  There is no sign of eustachian tube dysfunction or middle ear effusion.  The audiogram showed normal hearing in the LEFT, But the RIGHT ear drops off steeply above 1000Hz, and flattens out with a severe sensorineural hearing loss at 80-90dB thresholds.  No conductive hearing loss.       A/P - Fortino Polo is a 50 year old male  (H90.5) SNHL (sensory-neural hearing loss), asymmetrical  (primary encounter diagnosis)  (H93.11) Tinnitus, right    We definitely need an MRI of the brain, with such a sudden change on one side.    The rest of today's visit was spent discussing the theories behind the cause of assymetric sensorineural hearing loss.  These included the microembolic and viral neuritis theories.  Also we discussed the indication for MRI of the Brain and Internal Auditory Canals.  Treatment with 2 weeks of oral steroids was discussed as the only treatment shown in clinical research to improve the chances of recovery of the hearing loss.  However, I made it a point to discuss that there is a definite chance of no return of the hearing lost.  I have ordered an MRI of the brain and will call with results.      Time spent on audiological  testing and review of previous records, relevant labs and imaging, and relevant outside records totaled 45min

## 2021-03-05 ENCOUNTER — OFFICE VISIT (OUTPATIENT)
Dept: OTOLARYNGOLOGY | Facility: CLINIC | Age: 51
End: 2021-03-05
Payer: COMMERCIAL

## 2021-03-05 ENCOUNTER — OFFICE VISIT (OUTPATIENT)
Dept: AUDIOLOGY | Facility: CLINIC | Age: 51
End: 2021-03-05
Payer: COMMERCIAL

## 2021-03-05 VITALS
HEIGHT: 72 IN | HEART RATE: 77 BPM | SYSTOLIC BLOOD PRESSURE: 130 MMHG | DIASTOLIC BLOOD PRESSURE: 82 MMHG | WEIGHT: 217 LBS | RESPIRATION RATE: 17 BRPM | BODY MASS INDEX: 29.39 KG/M2 | OXYGEN SATURATION: 95 %

## 2021-03-05 DIAGNOSIS — H90.41 SENSORINEURAL HEARING LOSS (SNHL) OF RIGHT EAR WITH UNRESTRICTED HEARING OF LEFT EAR: Primary | ICD-10-CM

## 2021-03-05 DIAGNOSIS — H93.11 TINNITUS, RIGHT: ICD-10-CM

## 2021-03-05 DIAGNOSIS — H93.11 RIGHT-SIDED TINNITUS: ICD-10-CM

## 2021-03-05 DIAGNOSIS — H90.3 SNHL (SENSORY-NEURAL HEARING LOSS), ASYMMETRICAL: Primary | ICD-10-CM

## 2021-03-05 PROCEDURE — 92550 TYMPANOMETRY & REFLEX THRESH: CPT | Performed by: AUDIOLOGIST

## 2021-03-05 PROCEDURE — 99207 PR NO CHARGE LOS: CPT | Performed by: AUDIOLOGIST

## 2021-03-05 PROCEDURE — 92557 COMPREHENSIVE HEARING TEST: CPT | Performed by: AUDIOLOGIST

## 2021-03-05 PROCEDURE — 99204 OFFICE O/P NEW MOD 45 MIN: CPT | Performed by: OTOLARYNGOLOGY

## 2021-03-05 ASSESSMENT — PAIN SCALES - GENERAL: PAINLEVEL: NO PAIN (0)

## 2021-03-05 ASSESSMENT — MIFFLIN-ST. JEOR: SCORE: 1882.31

## 2021-03-05 NOTE — LETTER
3/5/2021         RE: Fortino Polo  [address] [facility] [id number]        Dear Colleague,    Thank you for referring your patient, Fortino Polo, to the [facility]. Please see a copy of my visit note below.    History of Present Illness - Fortino Polo is a very pleasant 50 year old male here to see me for the first time at the consult of Zbigniew Garcia for recurent RIGHT ear pain and tinnitus.    He had noticed for a long time that he would get momentary to mild tinnitus only in the RIGHT ear.  But back in January the tinnitus on the RIGHT would be constant.  He also noted occasional pains and fullness in the RIGHT ear and was told it could be eustachian tube dysfunction and allergies.    There has been no change in balance.  He reports that he has no history of ear disease or ear surgery.  No changes in vision.  No chemo or radiation therapy.    Past Medical History -   Patient Active Problem List   Diagnosis     NO ACTIVE PROBLEMS     CARDIOVASCULAR SCREENING; LDL GOAL LESS THAN 160     BMI 29.0-29.9,adult       Current Medications -   Current Outpatient Medications:      erythromycin (ROMYCIN) 5 MG/GM ophthalmic ointment, Apply once daily to lid margin at bedtime (Patient not taking: Reported on 2/5/2021), Disp: 3.5 g, Rfl: 0    Allergies -   Allergies   Allergen Reactions     Nkda [No Known Drug Allergies]        Social History -   Social History     Socioeconomic History     Marital status:      Spouse name: Not on file     Number of children: Not on file     Years of education: Not on file     Highest education level: Not on file   Occupational History     Not on file   Social Needs     Financial resource strain: Not on file     Food insecurity     Worry: Not on file     Inability: Not on file     Transportation needs     Medical: Not on file     Non-medical: Not on file   Tobacco Use     Smoking status: Never Smoker     Smokeless tobacco: Former User    Substance and Sexual Activity     Alcohol use: Yes     Comment: weekends beer     Drug use: No     Sexual activity: Yes     Partners: Female   Lifestyle     Physical activity     Days per week: Not on file     Minutes per session: Not on file     Stress: Not on file   Relationships     Social connections     Talks on phone: Not on file     Gets together: Not on file     Attends [employer] service: Not on file     Active member of club or organization: Not on file     Attends meetings of clubs or organizations: Not on file     Relationship status: Not on file     Intimate partner violence     Fear of current or ex partner: Not on file     Emotionally abused: Not on file     Physically abused: Not on file     Forced sexual activity: Not on file   Other Topics Concern     Parent/sibling w/ CABG, MI or angioplasty before 65F 55M? Not Asked      Service No     Blood Transfusions No     Caffeine Concern No     Occupational Exposure No     Hobby Hazards No     Sleep Concern No     Stress Concern No     Weight Concern No     Special Diet No     Back Care No     Exercise Yes     Bike Helmet Yes     Seat Belt Yes     Self-Exams Yes   Social History Narrative     Not on file       Family History -   Family History   Problem Relation Age of Onset     Hypertension Mother      Heart Disease Father      C.A.D. Father      Cerebrovascular Disease Father          81 possible pneumonia     Genitourinary Problems Sister         liver        Review of Systems - As per HPI and PMHx, otherwise 10+ system review of the head and neck, and general constitution is negative.    Physical Exam  /82   Pulse 77   Resp 17   Ht 1.829 m (6')   Wt 98.4 kg (217 lb)   SpO2 95%   BMI 29.43 kg/m      General - The patient is well nourished and well developed, and appears to have good nutritional status.  Alert and oriented to person and place, answers questions and cooperates with examination appropriately.   Head and Face -  Normocephalic and atraumatic, with no gross asymmetry noted of the contour of the facial features.  The facial nerve is intact, with strong symmetric movements.  Voice and Breathing - The patient was breathing comfortably without the use of accessory muscles. There was no wheezing, stridor, or stertor.  The patients voice was clear and strong, and had appropriate pitch and quality.  Ears - The tympanic membranes are normal in appearance, bony landmarks are intact.  No retraction, perforation, or masses.  No fluid or purulence was seen in the external canal or the middle ear. No evidence of infection of the middle ear or external canal, cerumen was normal in appearance.  Eyes - Extraocular movements intact, and the pupils were reactive to light.  Sclera were not icteric or injected, conjunctiva were pink and moist.      Audiologic Studies - An audiogram and tympanogram were performed today as part of the evaluation and personally reviewed. The tympanogram shows a normal Type A curve, with normal canal volume and middle ear pressure.  There is no sign of eustachian tube dysfunction or middle ear effusion.  The audiogram showed normal hearing in the LEFT, But the RIGHT ear drops off steeply above 1000Hz, and flattens out with a severe sensorineural hearing loss at 80-90dB thresholds.  No conductive hearing loss.       A/P - Fortino Polo is a 50 year old male  (H90.5) SNHL (sensory-neural hearing loss), asymmetrical  (primary encounter diagnosis)  (H93.11) Tinnitus, right    We definitely need an MRI of the brain, with such a sudden change on one side.    The rest of today's visit was spent discussing the theories behind the cause of assymetric sensorineural hearing loss.  These included the microembolic and viral neuritis theories.  Also we discussed the indication for MRI of the Brain and Internal Auditory Canals.  Treatment with 2 weeks of oral steroids was discussed as the only treatment shown in clinical  research to improve the chances of recovery of the hearing loss.  However, I made it a point to discuss that there is a definite chance of no return of the hearing lost.  I have ordered an MRI of the brain and will call with results.      Time spent on audiological testing and review of previous records, relevant labs and imaging, and relevant outside records totaled 45min        Again, thank you for allowing me to participate in the care of your patient.        Sincerely,        Mayo Rhoades MD

## 2021-03-05 NOTE — PROGRESS NOTES
AUDIOLOGY REPORT:    Patient was referred to [facility] from ENT by Dr. Rhoades for a hearing examination. Patient reports right ear tinnitus, otalgia (sharp/stabbing), and fullness on and off for some time. Patient reports this has happened in the past but was more linked to allergy season. Patient was unaccompanied to today's visit.     Testing:    Otoscopy:   Otoscopic exam indicates ears are clear of cerumen bilaterally     Tympanograms:    RIGHT: normal eardrum mobility     LEFT:   normal eardrum mobility    Reflexes (reported by stimulus ear): 1000 Hz  RIGHT: Ipsilateral is present at normal levels  RIGHT: Contralateral is present at normal levels  LEFT:   Ipsilateral is present at normal levels  LEFT:   Contralateral is present at normal levels    Thresholds:   Pure Tone Thresholds assessed using conventional  audiometry with good  reliability from 250-8000 Hz bilaterally using insert earphones     RIGHT:  normal hearing sensitivity through 1500 Hz then a moderate to severe sensorineural hearing loss    LEFT:    normal hearing sensitivity for all frequencies tested.     Speech Reception Threshold:    RIGHT: 15 dB HL    LEFT:   10 dB HL    Word Recognition Score:     RIGHT: 88% at 75 dB HL using NU-6 recorded word list.    LEFT:   100% at 50 dB HL using NU-6 recorded word list.    Discussed results with the patient.     Patient was returned to ENT for follow up.     Pb Welch CCC-A  Licensed Audiologist  3/5/2021                                          '

## 2021-03-05 NOTE — PATIENT INSTRUCTIONS
Scheduling Information  To schedule your CT/MRI scan, please contact [facility] at [phone] OR [facility] at [phone]    To schedule your Surgery, please contact our Specialty Schedulers at [phone]      ENT Clinic Locations Clinic Hours Telephone Number     [facility]  [address] [place] [address]   Monday:           1:00pm -- 5:00pm    Friday:              8:00am - 12:00pm   To schedule/reschedule an appointment with   Dr. Rhoades,   please contact our   Specialty Scheduling Department at:     [phone]       [facility]  [address] [place] [address] Tuesday:          8:00am -- 2:00pm         Urgent Care Locations Clinic Hours Telephone Numbers     [facility]  [address] [place] [address]     Monday-Friday:     11:00am - 9:00pm    Saturday-Sunday:  9:00am - 5:00pm   [phone]     [facility]  [address] [place] [address]     Monday-Friday:      5:00pm - 9:00pm     Saturday-Sunday:  9:00am - 5:00pm   [phone]

## 2021-03-09 ENCOUNTER — ANCILLARY PROCEDURE (OUTPATIENT)
Dept: MRI IMAGING | Facility: CLINIC | Age: 51
End: 2021-03-09
Attending: OTOLARYNGOLOGY
Payer: COMMERCIAL

## 2021-03-09 ENCOUNTER — TELEPHONE (OUTPATIENT)
Dept: OTOLARYNGOLOGY | Facility: CLINIC | Age: 51
End: 2021-03-09

## 2021-03-09 DIAGNOSIS — D33.3 VESTIBULAR SCHWANNOMA (H): Primary | ICD-10-CM

## 2021-03-09 DIAGNOSIS — H93.11 TINNITUS, RIGHT: ICD-10-CM

## 2021-03-09 DIAGNOSIS — H90.3 SNHL (SENSORY-NEURAL HEARING LOSS), ASYMMETRICAL: ICD-10-CM

## 2021-03-09 PROCEDURE — A9585 GADOBUTROL INJECTION: HCPCS | Performed by: RADIOLOGY

## 2021-03-09 PROCEDURE — 70553 MRI BRAIN STEM W/O & W/DYE: CPT | Mod: TC | Performed by: RADIOLOGY

## 2021-03-09 RX ORDER — AMITRIPTYLINE HYDROCHLORIDE 10 MG/1
10 TABLET ORAL AT BEDTIME
Qty: 60 TABLET | Refills: 3 | Status: SHIPPED | OUTPATIENT
Start: 2021-03-09 | End: 2021-03-19

## 2021-03-09 RX ORDER — GADOBUTROL 604.72 MG/ML
10 INJECTION INTRAVENOUS ONCE
Status: COMPLETED | OUTPATIENT
Start: 2021-03-09 | End: 2021-03-09

## 2021-03-09 RX ADMIN — GADOBUTROL 10 ML: 604.72 INJECTION INTRAVENOUS at 13:50

## 2021-03-09 NOTE — PROGRESS NOTES
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    I will refer to Neurotology at the     He also had a lot of questions about the severe tinnitus in the RIGHT ear.  I would expect that the neurotologist will refer to the tinnitus program at the .    I have sent in an Rx for Elavil at bedtime to try and help him sleep through the tinnitus.    Call with any further questions

## 2021-03-10 NOTE — TELEPHONE ENCOUNTER
Fwd to Md to answer additional qestions      Luz Maria NOVAK RN Specialty Triage 3/10/2021 10:35 AM

## 2021-03-10 NOTE — TELEPHONE ENCOUNTER
Called pt and let him know Dr Rhoades will call him either today, but most likely tomorrow. Pt verbalized understanding.    Luz Maria NOVAK RN Specialty Triage 3/10/2021 11:21 AM

## 2021-03-10 NOTE — TELEPHONE ENCOUNTER
Reason for Call:  Other call back    Detailed comments: patient states has some more questions about the test results on from 3/9/2021, please call back    Phone Number Patient can be reached at: Cell number on file:    Telephone Information:   Mobile [phone]       Best Time: anytime    Can we leave a detailed message on this number? YES    Call taken on 3/9/2021 at 7:15 PM by Kacey Rivera

## 2021-03-11 NOTE — TELEPHONE ENCOUNTER
FUTURE VISIT INFORMATION      FUTURE VISIT INFORMATION:    Date: 3/16/2021    Time: 3:15PM    Location: [facility] [place]  REFERRAL INFORMATION:    Referring provider:  Dr Mayo Rhoades    Referring providers clinic:  [facility]     Reason for visit/diagnosis  Vestibular schwannoma - Referred by Dr. Rhoades. MRI in PACs    RECORDS REQUESTED FROM:       Clinic name Comments Records Status Imaging Status   [facility]  3/5/2021 note from Dr Rhoades   3/5/2021 audiogram  [facility] 2/5/2021 note from Zbigniew ANDERSON Epic    Imaging 3/9/2021 MR Brain  [facility] PACS

## 2021-03-12 ENCOUNTER — TELEPHONE (OUTPATIENT)
Dept: OTOLARYNGOLOGY | Facility: CLINIC | Age: 51
End: 2021-03-12

## 2021-03-13 NOTE — PROGRESS NOTES
[facility]  Department of Neurosurgery  [facility]    March 16, 2021    Chief complaint: Right vestibular schwannoma, new patient visit    Dear Dr. Rhoades,    It was a pleasure to see Hector Polo in the [facility] today as a new patient.  He was seen in conjunction with my colleague and neurotology, Dr. Shirley Zuniga.  As you recall, Mr. Polo is a 50-year-old male who presented with right sided tinnitus and aural fullness.  He also gets occasional pain in the right ear.  He has had no imbalance.  He has no history of ear surgeries.  Your audiogram demonstrated asymmetric hearing loss, so an MRI was performed that demonstrated a right CPA mass, for which he was referred. The tinnitus and aural fullness have become rather debilitating and significantly affect his mood and quality of life.    Past medical history: None    Medications: erythromycin ophthalmic ointment    Allergies: nkda    Social history: , lifetime nonsmoker. Wife joins him in clinic today.    Family history: hypertension and heart disease    Exam: He is fluent with speech and very pleasant.  His extraocular movements are intact.  His face is symmetric with activation, rated as a House-Brackman 1.  He moves all extremities with apparent symmetric full strength.    Imaging: We reviewed the results of his MRI performed 3/9/2021.  This demonstrates a right vestibular schwannoma that measures 11 mm across the petrous face, 8 mm into the cistern, and 10 mm out the IAC.  There is no contact with the peduncle but there is no T2 cap.  There is no hydrocephalus.    Audiogram: We reviewed the results of the audiogram performed 3/5/2021.  This demonstrates high frequency hearing loss in the right ear.  Pure-tone average on the right is 33 dB, and on the left is 6 dB.  His word recognition score on the right is 88% at 75 dB, and on the left is 100% at 50 dB.    Assessment:  1.   Right vestibular schwannoma  2.  AAO-HNS hearing class B on the right, and class A on the left    Plan:  1.  We reviewed the results of his MRI and audiogram and discussed options for management, which include observation, radiosurgery, and microsurgical resection.  We discussed the relative risks and benefits of each of these approaches. Specifically for surgical resection, we discussed two approaches - one approach that would sacrifice hearing but maximize the likelihood of resecting the entire tumor versus a second approach designed for hearing preservation but that might not lead to a complete tumor resection.  2. We spoke extensively regarding approaches to help with his tinnitus including masking and cognitive therapy approaches. He is interested in this and we provided a referral to begin this as soon as possible.  3. We will arrange for a followup visit in 6 months to readdress his options for management. We discussed these options at length and anticipate that further discussion may be necessary to assist Hector in his decision making.  4. In the meantime, we encouraged him to contact us with any questions or concerns that may arise prior to his next visit with us.     It has been a pleasure to participate in the care of your patient.  Please do not hesitate to contact us if we may be of any assistance.    Sincerely,  Zbigniew Hunter MD

## 2021-03-16 ENCOUNTER — OFFICE VISIT (OUTPATIENT)
Dept: OTOLARYNGOLOGY | Facility: CLINIC | Age: 51
End: 2021-03-16
Payer: COMMERCIAL

## 2021-03-16 ENCOUNTER — PRE VISIT (OUTPATIENT)
Dept: OTOLARYNGOLOGY | Facility: CLINIC | Age: 51
End: 2021-03-16

## 2021-03-16 VITALS
OXYGEN SATURATION: 98 % | HEIGHT: 72 IN | RESPIRATION RATE: 21 BRPM | DIASTOLIC BLOOD PRESSURE: 96 MMHG | HEART RATE: 125 BPM | SYSTOLIC BLOOD PRESSURE: 148 MMHG | WEIGHT: 213.85 LBS | TEMPERATURE: 97.9 F | BODY MASS INDEX: 28.96 KG/M2

## 2021-03-16 DIAGNOSIS — D33.3 VESTIBULAR SCHWANNOMA (H): ICD-10-CM

## 2021-03-16 DIAGNOSIS — D33.3 ACOUSTIC NEUROMA (H): Primary | ICD-10-CM

## 2021-03-16 DIAGNOSIS — F32.A DEPRESSION, UNSPECIFIED DEPRESSION TYPE: Primary | ICD-10-CM

## 2021-03-16 PROCEDURE — 99215 OFFICE O/P EST HI 40 MIN: CPT | Performed by: OTOLARYNGOLOGY

## 2021-03-16 PROCEDURE — 99204 OFFICE O/P NEW MOD 45 MIN: CPT | Performed by: NEUROLOGICAL SURGERY

## 2021-03-16 RX ORDER — CETIRIZINE HYDROCHLORIDE 10 MG/1
10 TABLET ORAL DAILY
COMMUNITY
End: 2021-07-15

## 2021-03-16 ASSESSMENT — MIFFLIN-ST. JEOR: SCORE: 1868

## 2021-03-16 ASSESSMENT — PATIENT HEALTH QUESTIONNAIRE - PHQ9: SUM OF ALL RESPONSES TO PHQ QUESTIONS 1-9: 16

## 2021-03-16 ASSESSMENT — PAIN SCALES - GENERAL: PAINLEVEL: NO PAIN (0)

## 2021-03-16 NOTE — NURSING NOTE
Chief Complaint   Patient presents with     Cons[facility]     [facility]      Blood pressure (!) 148/96, pulse 125, temperature 97.9  F (36.6  C), resp. rate 21, height 1.829 m (6'), weight 97 kg (213 lb 13.5 oz), SpO2 98 %.    Real Juares LPN

## 2021-03-16 NOTE — LETTER
"3/16/2021       RE: Fortino Polo  [address] [facility] [address]     Dear Colleague,    Thank you for referring your patient, Fortino Polo, to the [facility] EAR NOSE AND THROAT CLINIC [place] at [facility]. Please see a copy of my visit note below.    Depression Response    Patient completed the PHQ-9 assessment for depression and scored >9? Yes  Question 9 on the PHQ-9 was positive for suicidality? No  Does patient have current mental health provider? No    Is this a virtual visit? No    I personally notified the following: clinic nurse   Patient PHQ 9 score is 16. Denies suicidal ideation.Clinic nurse notified. Referral for staff psychologist jc Juares LPN               March 16, 2021    Dear Dr. Rhoades,    We had the pleasure of seeing Mr. Fortino Polo in consultation today at your request for a newly diagnosed right vestibular schwannoma.    In February 2021, he developed tinnitus in his right ear along with an occasional feeling of fullness. An audiogram was acquired showing high frequency asymmetric sensorineural hearing loss which he had not been aware of previously. An MRI was acquired showing the vestibular schwannoma.  Initially, the tinnitus was not viewed as a threat as was easier to block out.  After the otolaryngologic evaluation, the tinnitus has become persistent and he describes is as devastating.  He sleeps with a fan and a sound machine.  Dr. Rhoades prescribed Elavil and he has been using that.  He was not sleeping for a few days after meeting with him. He is now taking it 3 times per day.  He states he is using it to treat his anxiety and depression. The anxiety has intensified as he thinks about having to manage the tinnitus long term.  He used the phrases that \"he feels hopeless\" and \"his life as he knows it is over\" and expressed that he feels that he has an illness.  He expressed regret over not somehow " having the tumor discovered earlier.    Past Medical History:   Diagnosis Date     Condyloma      Dyslipidemia      Pilonidal cyst      Past Surgical History:   Procedure Laterality Date     DRAIN PILONIDAL CYST SIMPL       REMOVAL OF NAIL PLATE       Current Outpatient Medications   Medication     cetirizine (ZYRTEC) 10 MG tablet     amitriptyline (ELAVIL) 10 MG tablet     No current facility-administered medications for this visit.         Allergies   Allergen Reactions     Nkda [No Known Drug Allergies]      Seasonal Allergies      Family History   Problem Relation Age of Onset     Hypertension Mother      Heart Disease Father      C.A.D. Father      Cerebrovascular Disease Father          81 possible pneumonia     Genitourinary Problems Sister         liver      Social History     Tobacco Use     Smoking status: Never Smoker     Smokeless tobacco: Former User   Substance Use Topics     Alcohol use: Yes     Comment: weekends beer     Drug use: No     Patient Supplied Answers to Review of Systems  [facility] ROS 3/11/2021   Constitutional Weight loss, Appetite change, Problems with sleep   Psychology Frequently feeling depressed or sad, Frequently feeling anxious   Ears, Nose, Throat Hearing loss, Ear pain, Ringing/noise in ears     Physical exam:  BP (!) 148/96   Pulse 125   Temp 97.9  F (36.6  C)   Resp 21   Ht 1.829 m (6')   Wt 97 kg (213 lb 13.5 oz)   SpO2 98%   BMI 29.00 kg/m    Gen: Appears physically well, accompanied by his wife  Neuro:  Facial nerve function normal bilaterally, voice quality normal  Ears: Normal bilaterally    Audiogram:  Right SRT 15dB, 88% WRS      MRI: There is a 16.6 mm transverse by 9.8 mm AP by 9.0 mm  craniocaudal extra-axial enhancing mass filling most of the right  internal auditory canal and extending into the cerebellopontine angle most consistent with a vestibular schwannoma.    Impression and Plan:  1. Right vestibular schwannoma  2. Right asymmetric sensorineural  hearing loss   3. Right tinnitus    We reviewed the imaging and diagnosis in detail, along with natural history of vestibular schwannoma as it relates to hearing and vestibular loss and the range of growth rates and patterns observed. The tumor is small and there is no brainstem contact. We reviewed tumor management strategies in detail, including watchful waiting, stereotactic radiosurgery, and microsurgical excision.  Given his good hearing and new diagnosis, we focused on an initial period of observation but also discussed hearing preservation surgery and the range of potential outcomes.  No treatment for the tumor is designed to alleviate tinnitus or improve his hearing, rather they are designed to prevent growth (SRS) or remove the tumor.    We feel it would be particularly helpful for him to meet with health psychology as he adjusts to his diagnosis and to consider CBT for tinnitus.  We also discussed additional tinnitus management strategies, including amplification and masking and offered referral to audiology for this.  Vestibular therapy may be considered for vestibular symptoms. Physical activity is encouraged. We will plan to revisit with him with an MRI and audiogram in 6 months.    Shirley Zuniga MD  Otology & Neurotology  [facility]

## 2021-03-16 NOTE — LETTER
C[facility]IT[facility]GERY  [facility] EAR NOSE AND THROAT [address]  [address] [facility] [address]  Phone: [phone]  Fax: [phone]          3/16/2021    RE:   Fortino Polo  [address] [facility] [address]      Dear Colleague,    Thank you for referring your patient, Fortino Polo, to the Center for [facility] Pit[facility] Surgery. Please see a copy of my visit note below.      [facility]  Department of Neurosurgery  [facility] Pituitary [facility]    March 16, 2021    Chief complaint: Right vestibular schwannoma, new patient visit    Dear Dr. Rhoades,    It was a pleasure to see Hector Polo in the [facility] today as a new patient.  He was seen in conjunction with my colleague and neurotology, Dr. Shirley Zuniga.  As you recall, Mr. Polo is a 50-year-old male who presented with right sided tinnitus and aural fullness.  He also gets occasional pain in the right ear.  He has had no imbalance.  He has no history of ear surgeries.  Your audiogram demonstrated asymmetric hearing loss, so an MRI was performed that demonstrated a right CPA mass, for which he was referred. The tinnitus and aural fullness have become rather debilitating and significantly affect his mood and quality of life.    Past medical history: None    Medications: erythromycin ophthalmic ointment    Allergies: nkda    Social history: , lifetime nonsmoker. Wife joins him in clinic today.    Family history: hypertension and heart disease    Exam: He is fluent with speech and very pleasant.  His extraocular movements are intact.  His face is symmetric with activation, rated as a House-Brackman 1.  He moves all extremities with apparent symmetric full strength.    Imaging: We reviewed the results of his MRI performed 3/9/2021.  This demonstrates a right vestibular schwannoma that measures 11 mm across the  petrous face, 8 mm into the cistern, and 10 mm out the IAC.  There is no contact with the peduncle but there is no T2 cap.  There is no hydrocephalus.    Audiogram: We reviewed the results of the audiogram performed 3/5/2021.  This demonstrates high frequency hearing loss in the right ear.  Pure-tone average on the right is 33 dB, and on the left is 6 dB.  His word recognition score on the right is 88% at 75 dB, and on the left is 100% at 50 dB.    Assessment:  1.  Right vestibular schwannoma  2.  AAO-HNS hearing class B on the right, and class A on the left    Plan:  1.  We reviewed the results of his MRI and audiogram and discussed options for management, which include observation, radiosurgery, and microsurgical resection.  We discussed the relative risks and benefits of each of these approaches. Specifically for surgical resection, we discussed two approaches - one approach that would sacrifice hearing but maximize the likelihood of resecting the entire tumor versus a second approach designed for hearing preservation but that might not lead to a complete tumor resection.  2. We spoke extensively regarding approaches to help with his tinnitus including masking and cognitive therapy approaches. He is interested in this and we provided a referral to begin this as soon as possible.  3. We will arrange for a followup visit in 6 months to readdress his options for management. We discussed these options at length and anticipate that further discussion may be necessary to assist Hector in his decision making.  4. In the meantime, we encouraged him to contact us with any questions or concerns that may arise prior to his next visit with us.     It has been a pleasure to participate in the care of your patient.  Please do not hesitate to contact us if we may be of any assistance.    Sincerely,  Zbigniew Hunter MD

## 2021-03-16 NOTE — PATIENT INSTRUCTIONS
1. You were seen in the ENT Clinic today by Dr. Zuniga.  If you have any questions or concerns after your appointment, please call   - Option 1: ENT Clinic: [phone]  - Option 2: [place] (Dr. Zuniga): [phone]    2.   Hearing aid evaluation.    3.   Referral to audio for tinnitus masking evaluation.    4.   Cognitive behavioral therapy referral for tinnitus.  Call to schedule, Joel Landon [phone].     5.   Mental health referral for tumor diagnosis, call with questions 1 [phone].    6.   Plan to return to clinic in 6 months with a hearing test and an MRI.    Orly Foster RN  Clinical Coordinator  [facility]  [phone]

## 2021-03-16 NOTE — Clinical Note
3/16/2021       RE: Fortino Polo  [address] [facility] [address]     Dear Colleague,    Thank you for referring your patient, Fortino Polo, to the [facility] EAR NOSE AND THROAT CLINIC [place] at [facility]. Please see a copy of my visit note below.    [facility]  Department of Neurosurgery  Center for Skull Base and Pituitary Surgery    March 16, 2021    Chief complaint: Right vestibular schwannoma, new patient visit    Dear Dr. Rhoades,    It was a pleasure to see Hector Polo in the [facility] today as a new patient.  He was seen in conjunction with my colleague and neurotology, Dr. Shirley Zuniga.  As you recall, Mr. Polo is a 50-year-old male who presented with right sided tinnitus and aural fullness.  He also gets occasional pain in the right ear.  He has had no imbalance.  He has no history of ear surgeries.  Your audiogram demonstrated asymmetric hearing loss, so an MRI was performed that demonstrated a right CPA mass, for which he was referred. The tinnitus and aural fullness have become rather debilitating and significantly affect his mood and quality of life.    Past medical history: None    Medications: erythromycin ophthalmic ointment    Allergies: nkda    Social history: , lifetime nonsmoker. Wife joins him in clinic today.    Family history: hypertension and heart disease    Exam: He is fluent with speech and very pleasant.  His extraocular movements are intact.  His face is symmetric with activation, rated as a House-Brackman 1.  He moves all extremities with apparent symmetric full strength.    Imaging: We reviewed the results of his MRI performed 3/9/2021.  This demonstrates a right vestibular schwannoma that measures 11 mm across the petrous face, 8 mm into the cistern, and 10 mm out the IAC.  There is no contact with the peduncle but there is no T2 cap.  There is no  hydrocephalus.    Audiogram: We reviewed the results of the audiogram performed 3/5/2021.  This demonstrates high frequency hearing loss in the right ear.  Pure-tone average on the right is 33 dB, and on the left is 6 dB.  His word recognition score on the right is 88% at 75 dB, and on the left is 100% at 50 dB.    Assessment:  1.  Right vestibular schwannoma  2.  AAO-HNS hearing class B on the right, and class A on the left    Plan:  1.  We reviewed the results of his MRI and audiogram and discussed options for management, which include observation, radiosurgery, and microsurgical resection.  We discussed the relative risks and benefits of each of these approaches. Specifically for surgical resection, we discussed two approaches - one approach that would sacrifice hearing but maximize the likelihood of resecting the entire tumor versus a second approach designed for hearing preservation but that might not lead to a complete tumor resection.  2. We spoke extensively regarding approaches to help with his tinnitus including masking and cognitive therapy approaches. He is interested in this and we provided a referral to begin this as soon as possible.  3. We will arrange for a followup visit in 6 months to readdress his options for management. We discussed these options at length and anticipate that further discussion may be necessary to assist Hector in his decision making.  4. In the meantime, we encouraged him to contact us with any questions or concerns that may arise prior to his next visit with us.     It has been a pleasure to participate in the care of your patient.  Please do not hesitate to contact us if we may be of any assistance.    Sincerely,  Zbigniew Hunter MD      Again, thank you for allowing me to participate in the care of your patient.      Sincerely,    Zbigniew Hunter MD

## 2021-03-16 NOTE — PROGRESS NOTES
"2021    Dear Dr. Rhoades,    We had the pleasure of seeing Mr. Fortino Polo in consultation today at your request for a newly diagnosed right vestibular schwannoma.    In 2021, he developed tinnitus in his right ear along with an occasional feeling of fullness. An audiogram was acquired showing high frequency asymmetric sensorineural hearing loss which he had not been aware of previously. An MRI was acquired showing the vestibular schwannoma.  Initially, the tinnitus was not viewed as a threat as was easier to block out.  After the otolaryngologic evaluation, the tinnitus has become persistent and he describes is as devastating.  He sleeps with a fan and a sound machine.  Dr. Rhoades prescribed Elavil and he has been using that.  He was not sleeping for a few days after meeting with him. He is now taking it 3 times per day.  He states he is using it to treat his anxiety and depression. The anxiety has intensified as he thinks about having to manage the tinnitus long term.  He used the phrases that \"he feels hopeless\" and \"his life as he knows it is over\" and expressed that he feels that he has an illness.  He expressed regret over not somehow having the tumor discovered earlier.    Past Medical History:   Diagnosis Date     Condyloma      Dyslipidemia      Pilonidal cyst      Past Surgical History:   Procedure Laterality Date     DRAIN PILONIDAL CYST SIMPL       REMOVAL OF NAIL PLATE       Current Outpatient Medications   Medication     cetirizine (ZYRTEC) 10 MG tablet     amitriptyline (ELAVIL) 10 MG tablet     No current facility-administered medications for this visit.         Allergies   Allergen Reactions     Nkda [No Known Drug Allergies]      Seasonal Allergies      Family History   Problem Relation Age of Onset     Hypertension Mother      Heart Disease Father      C.A.D. Father      Cerebrovascular Disease Father          81 possible pneumonia     Genitourinary Problems Sister         " liver      Social History     Tobacco Use     Smoking status: Never Smoker     Smokeless tobacco: Former User   Substance Use Topics     Alcohol use: Yes     Comment: weekends beer     Drug use: No     Patient Supplied Answers to Review of Systems  [facility] ROS 3/11/2021   Constitutional Weight loss, Appetite change, Problems with sleep   Psychology Frequently feeling depressed or sad, Frequently feeling anxious   Ears, Nose, Throat Hearing loss, Ear pain, Ringing/noise in ears     Physical exam:  BP (!) 148/96   Pulse 125   Temp 97.9  F (36.6  C)   Resp 21   Ht 1.829 m (6')   Wt 97 kg (213 lb 13.5 oz)   SpO2 98%   BMI 29.00 kg/m    Gen: Appears physically well, accompanied by his wife  Neuro:  Facial nerve function normal bilaterally, voice quality normal  Ears: Normal bilaterally    Audiogram:  Right SRT 15dB, 88% WRS      MRI: There is a 16.6 mm transverse by 9.8 mm AP by 9.0 mm  craniocaudal extra-axial enhancing mass filling most of the right  internal auditory canal and extending into the cerebellopontine angle most consistent with a vestibular schwannoma.    Impression and Plan:  1. Right vestibular schwannoma  2. Right asymmetric sensorineural hearing loss   3. Right tinnitus    We reviewed the imaging and diagnosis in detail, along with natural history of vestibular schwannoma as it relates to hearing and vestibular loss and the range of growth rates and patterns observed. The tumor is small and there is no brainstem contact. We reviewed tumor management strategies in detail, including watchful waiting, stereotactic radiosurgery, and microsurgical excision.  Given his good hearing and new diagnosis, we focused on an initial period of observation but also discussed hearing preservation surgery and the range of potential outcomes.  No treatment for the tumor is designed to alleviate tinnitus or improve his hearing, rather they are designed to prevent growth (SRS) or remove the tumor.    We feel it would  be particularly helpful for him to meet with health psychology as he adjusts to his diagnosis and to consider CBT for tinnitus.  We also discussed additional tinnitus management strategies, including amplification and masking and offered referral to audiology for this.  Vestibular therapy may be considered for vestibular symptoms. Physical activity is encouraged. We will plan to revisit with him with an MRI and audiogram in 6 months.    Shirley Zuniga MD  Otology & Neurotology  [facility]

## 2021-03-16 NOTE — PROGRESS NOTES
Depression Response    Patient completed the PHQ-9 assessment for depression and scored >9? Yes  Question 9 on the PHQ-9 was positive for suicidality? No  Does patient have current mental health provider? No    Is this a virtual visit? No    I personally notified the following: clinic nurse   Patient PHQ 9 score is 16. Denies suicidal ideation.Clinic nurse notified. Referral for staff psychologist placed.  Real Juares LPN

## 2021-03-19 ENCOUNTER — TELEPHONE (OUTPATIENT)
Dept: OTOLARYNGOLOGY | Facility: CLINIC | Age: 51
End: 2021-03-19

## 2021-03-19 DIAGNOSIS — H90.3 SNHL (SENSORY-NEURAL HEARING LOSS), ASYMMETRICAL: ICD-10-CM

## 2021-03-19 DIAGNOSIS — H93.11 TINNITUS, RIGHT: ICD-10-CM

## 2021-03-19 DIAGNOSIS — D33.3 VESTIBULAR SCHWANNOMA (H): ICD-10-CM

## 2021-03-19 RX ORDER — AMITRIPTYLINE HYDROCHLORIDE 10 MG/1
10 TABLET ORAL 3 TIMES DAILY PRN
Qty: 90 TABLET | Refills: 6 | Status: SHIPPED | OUTPATIENT
Start: 2021-03-19 | End: 2021-04-23

## 2021-03-19 NOTE — TELEPHONE ENCOUNTER
Reason for Call:  Other prescription    Detailed comments: Patient said Dr Verona changed the medication directions of his amitriptyline to he can take up to 3 times a day, that was never changed with the pharmacy and he is having a hard time filling it, can you send over the new directions to the [facility]     Phone Number Patient can be reached at: Cell number on file:    Telephone Information:   Mobile [phone]       Best Time:     Can we leave a detailed message on this number? NO    Call taken on 3/19/2021 at 12:45 PM by Carlee Land

## 2021-03-19 NOTE — TELEPHONE ENCOUNTER
Received call from pharmacist at [facility]. She states that the script that they received for amitriptyline does not make sense. She is asking for clarification. She was wondering, did he mean 3 tablet at bedtime or did he just meant 1 tablet 3 times daily as needed? She states that if he was to take it throughout the day as needed that it does not make sense to be for sleep.    If the directions are incorrect, please send a corrected script or call [facility] back at [phone] to speak to pharmacist.

## 2021-03-22 ENCOUNTER — TELEPHONE (OUTPATIENT)
Dept: OTOLARYNGOLOGY | Facility: CLINIC | Age: 51
End: 2021-03-22

## 2021-03-23 NOTE — TELEPHONE ENCOUNTER
Called patient to discuss follow up from his last office visit.      He is planning to schedule his hearing aid eval and the tinnitus masking eval.      He already has appointments with behavioral health for tinnitus support and adult mental health.      Will plan to call in a couple months and get the follow up visit, MRI and hearing test scheduled.      All questions answered and concerns addressed.    Orly Foster RN

## 2021-03-24 ENCOUNTER — VIRTUAL VISIT (OUTPATIENT)
Dept: BEHAVIORAL HEALTH | Facility: CLINIC | Age: 51
End: 2021-03-24
Attending: OTOLARYNGOLOGY
Payer: COMMERCIAL

## 2021-03-24 DIAGNOSIS — F43.23 ADJUSTMENT DISORDER WITH MIXED ANXIETY AND DEPRESSED MOOD: Primary | ICD-10-CM

## 2021-03-24 PROCEDURE — 90834 PSYTX W PT 45 MINUTES: CPT | Mod: GT | Performed by: MARRIAGE & FAMILY THERAPIST

## 2021-03-24 SDOH — ECONOMIC STABILITY: INCOME INSECURITY: IN THE LAST 12 MONTHS, WAS THERE A TIME WHEN YOU WERE NOT ABLE TO PAY THE MORTGAGE OR RENT ON TIME?: NO

## 2021-03-24 ASSESSMENT — PATIENT HEALTH QUESTIONNAIRE - PHQ9
SUM OF ALL RESPONSES TO PHQ QUESTIONS 1-9: 16
SUM OF ALL RESPONSES TO PHQ QUESTIONS 1-9: 16
10. IF YOU CHECKED OFF ANY PROBLEMS, HOW DIFFICULT HAVE THESE PROBLEMS MADE IT FOR YOU TO DO YOUR WORK, TAKE CARE OF THINGS AT HOME, OR GET ALONG WITH OTHER PEOPLE: VERY DIFFICULT

## 2021-03-24 ASSESSMENT — COLUMBIA-SUICIDE SEVERITY RATING SCALE - C-SSRS
5. HAVE YOU STARTED TO WORK OUT OR WORKED OUT THE DETAILS OF HOW TO KILL YOURSELF? DO YOU INTEND TO CARRY OUT THIS PLAN?: NO
ATTEMPT LIFETIME: NO
4. HAVE YOU HAD THESE THOUGHTS AND HAD SOME INTENTION OF ACTING ON THEM?: NO
3. HAVE YOU BEEN THINKING ABOUT HOW YOU MIGHT KILL YOURSELF?: NO
TOTAL  NUMBER OF INTERRUPTED ATTEMPTS LIFETIME: NO
6. HAVE YOU EVER DONE ANYTHING, STARTED TO DO ANYTHING, OR PREPARED TO DO ANYTHING TO END YOUR LIFE?: NO
TOTAL  NUMBER OF INTERRUPTED ATTEMPTS PAST 3 MONTHS: NO
ATTEMPT PAST THREE MONTHS: NO
TOTAL  NUMBER OF ABORTED OR SELF INTERRUPTED ATTEMPTS PAST 3 MONTHS: NO
1. IN THE PAST MONTH, HAVE YOU WISHED YOU WERE DEAD OR WISHED YOU COULD GO TO SLEEP AND NOT WAKE UP?: NO
1. IN THE PAST MONTH, HAVE YOU WISHED YOU WERE DEAD OR WISHED YOU COULD GO TO SLEEP AND NOT WAKE UP?: NO
TOTAL  NUMBER OF ABORTED OR SELF INTERRUPTED ATTEMPTS PAST LIFETIME: NO
5. HAVE YOU STARTED TO WORK OUT OR WORKED OUT THE DETAILS OF HOW TO KILL YOURSELF? DO YOU INTEND TO CARRY OUT THIS PLAN?: NO
4. HAVE YOU HAD THESE THOUGHTS AND HAD SOME INTENTION OF ACTING ON THEM?: NO
2. HAVE YOU ACTUALLY HAD ANY THOUGHTS OF KILLING YOURSELF LIFETIME?: NO
2. HAVE YOU ACTUALLY HAD ANY THOUGHTS OF KILLING YOURSELF?: NO
6. HAVE YOU EVER DONE ANYTHING, STARTED TO DO ANYTHING, OR PREPARED TO DO ANYTHING TO END YOUR LIFE?: NO

## 2021-03-24 ASSESSMENT — ANXIETY QUESTIONNAIRES
4. TROUBLE RELAXING: NEARLY EVERY DAY
2. NOT BEING ABLE TO STOP OR CONTROL WORRYING: NEARLY EVERY DAY
5. BEING SO RESTLESS THAT IT IS HARD TO SIT STILL: MORE THAN HALF THE DAYS
7. FEELING AFRAID AS IF SOMETHING AWFUL MIGHT HAPPEN: NEARLY EVERY DAY
3. WORRYING TOO MUCH ABOUT DIFFERENT THINGS: NEARLY EVERY DAY
GAD7 TOTAL SCORE: 18
GAD7 TOTAL SCORE: 18
7. FEELING AFRAID AS IF SOMETHING AWFUL MIGHT HAPPEN: NEARLY EVERY DAY
1. FEELING NERVOUS, ANXIOUS, OR ON EDGE: NEARLY EVERY DAY
GAD7 TOTAL SCORE: 18
6. BECOMING EASILY ANNOYED OR IRRITABLE: SEVERAL DAYS

## 2021-03-24 NOTE — PROGRESS NOTES
[facility] (Audiology referral)  March 24, 2021  Video Visit      Behavioral Health Clinician Progress Note    Patient Name: Fortino Polo           Service Type:  Individual      Service Location:   Video Visit     Session Start Time: 207pm  Session End Time: 250      Session Length: 38 - 52      Attendees: Patient    Visit Activities (Refresh list every visit): NEW and [facility] Only    Diagnostic Assessment Date: none on file  Treatment Plan Review Date: none with me yet  See Flowsheets for today's PHQ-9 and ROSEANNA-7 results  Previous PHQ-9:   PHQ-9 SCORE 3/16/2021 3/24/2021   PHQ-9 Total Score MyChart - 16 (Moderately severe depression)   PHQ-9 Total Score 16 16     Previous ROSEANNA-7:   ROSEANNA-7 SCORE 3/24/2021   Total Score 18 (severe anxiety)   Total Score 18       MADELINE LEVEL:  MADELINE Score (Last Two) 9/20/2011   MADELINE Raw Score 43   Activation Score 68.5   MADELINE Level 4       DATA  Extended Session (60+ minutes): No  Interactive Complexity: No  Crisis: No  [facility] Patient: No    Treatment Objective(s) Addressed in This Session:  Target Behavior(s): disease management/lifestyle changes      Patient  will develop coping/problem-solving skills to facilitate more adaptive adjustment    Current Stressors / Issues:  Telemedicine Visit: The patient's condition can be safely assessed and treated via synchronous audio and visual telemedicine encounter.      Reason for Telemedicine Visit: Covid-19 pandemic    Originating Site (Patient Location): Patient's home    Distant Site (Provider Location): Provider Remote Setting    Consent:  The patient/guardian has verbally consented to: the potential risks and benefits of telemedicine (video visit) versus in person care; bill my insurance or make self-payment for services provided; and responsibility for payment of non-covered services.     Mode of Communication:  Video Conference via Snapt    As the provider I attest to compliance with applicable laws and regulations  "related to telemedicine.      [facility] met with Fortino at health care team's request to assess his current behavioral health needs and provide appropriate intervention. Fortino was referred by Dr Harris from ENT for support with some new diagnoses and depression.  We spent today's session discussing Fortino's history, current stressors, and exploring the possible benefits of psychotherapy. Focus was on establishing a positive therapeutic alliance and establishing initial goals.    Galen has no hx of depression and finds that this experience is directly related to his health stress.  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 The ringing in his ears/tinnitus began due to this tumor, shortly after. His hearing has been impacted. He is exploring his various treatment options.     Discussed his options per surgery. He is likely to lose hearing in his right year, but they can try to save some.  Or he can save his hearing and deal with the tinnitus. More like a hiss rather than a ringing. He is open to habituation and other approaches to working with it. He has a good understanding of these ideas from his conversations with the care teams, and also hearing about his father-in-law's experience with similar issues. This gives him some increased hope and confidence.     He needs to make a decision about treatment plan eventually, but has several months to consider things. Thinking through these things is another thing he needs to consider. He may or may not choose surgery. He has only been dealing with this since a new dx in February, but he has some guilt about not attending to the things he sees are signals when looking back.      We had a good conversation about the idea of habituation as the brain gets used to the tinnitus and begins to be able to \"ignore\" it. Discussed how he can keep working with this, be patient while this unfolds. He knows that distractions can help. Stress " makes it worse. He has started some medications that help with stress. He started with Elavil to help address his mood.     We discussed CBT at some length, given that he has been told this is the best way to work with tinnitus experience. We explored the ideas and work of CBT around the habituation process, and how to work with acceptance and learning to be with the ringing as it happens. Noted changes he is already seeing - explored the changes in his thinking since the diagnosis of the tumor, etc.     Discussed not resisting his negative thoughts, but offering himself a compassionate, supportive appraisal of the rumination he experiences (self-validation) before beginning to try to challenge or adapt them to something more helpful/constructive/realistic. Discussed journaling about the negative thoughts, bringing them to therapy for conversation.      He denies any suicidal ideation now or in the past. He has a wife and two sons, a good family, and these all keep him supported and motivated, he reports.     Depression is improving a bit at a time already, and we spent time affirming and supporting this natural process, and hoe he has helped make this happen by gathering information, focusing on helpful thinking patterns, etc.     Moving forward, I did offer to meet again, but given that my colleague Dr Amish Telles has more experience with tinnitus, I arranged for a [facility] follow-up with him in a few weeks. Hector was very open to this idea. I also suggested the workbook called Mind Over Mood as a way to work with CBT, if he is interested. Hector will plan to keep his scheduled appointment with Dr Joel Landon at the end of April, for longer-term therapy support.     I affirmed the steps this patient has taken to address physical and behavioral health issues, and offered continued behavioral health services or referral, now or in the future, as needed by the patient.    Progress on Treatment Objective(s) / Homework:  New  Objective established this session - ACTION (Actively working towards change); Intervened by reinforcing change plan / affirming steps taken    Interventions drawn from:  Psycho-education regarding mental health diagnoses and treatment options    Motivational Interviewing    Skills training    Explored specific skills useful to client in current situation    Skill areas include assertiveness, communication, conflict management, problem-solving, relaxation, etc.     Solution-Focused Therapy    Explored patterns in patient's behaviors and relationships and discussed options for new behaviors    Explored new options for problem-solving, communication, managing stress, etc.    Cognitive-behavioral Therapy    Discussed common cognitive distortions, identified them in patient's life    Explored ways to challenge, replace, and act against these cognitions    Explore behavioral changes that might benefit patient in improving mood and engage in meaningful activity    Acceptance and Commitment Therapy    Explored and identified important values in patient's life    Discussed ways to commit to behavioral activation around these values    Behavioral Activation    Discussed steps patient can take to become more involved in meaningful activity    Identified barriers to these activities and explored possible solutions    Mindfulness-Based Strategies    Discussed skills based on development and application of mindfulness    Skills drawn from compassion-focused therapy, dialectical behavior therapy, mindfulness-based stress reduction, mindfulness-based cognitive therapy, etc.    Medication Review:  No problems reported to [facility] today.    Medication Compliance:  No problems reported to [facility] today.    Changes in Health Issues:  No problems reported to [facility] today.    Chemical Use Review:   Substance Use: Chemical use reviewed, no active concerns identified      Tobacco Use: No current tobacco use.      Mental Status  Assessment:  Appearance:   Appropriate   Eye Contact:   Good   Psychomotor Behavior: Normal   Attitude:   Cooperative   Orientation:   All  Speech   Rate / Production: Normal    Volume:  Normal   Mood:    Anxious  Depressed   Affect:    Appropriate   Thought Content:  Clear   Thought Form:  Coherent  Logical   Insight:    Good     Safety Assessment:  Patient denies a history of suicidal ideation, suicide attempts, self-injurious behavior, homicidal ideation, homicidal behavior and and other safety concerns  Patient denies current or recent suicidal ideation or behaviors.  Patient denies current or recent homicidal ideation or behaviors.  Patient denies current or recent self injurious behavior or ideation.  Patient denies other safety concerns.  Protective Factors Children in the home , Sense of responsibility to family, Life Satisfaction, Reality testing ability, Positive coping skills, Positive problem-solving skills and Positive social support   Risk Factors None reported    Plan for Safety and Risk Management:  A safety and risk management plan has not been developed at this time, however patient was encouraged to call [place] [phone] should there be a change in any of these risk factors.    Diagnoses:  1. Adjustment disorder with mixed anxiety and depressed mood        Collateral Reports Completed:  Will collaborate with care team as indicated during treatment    Plan: (Homework, other):  Moving forward, I did offer to meet again, but given that my colleague Dr Amish Telles has more experience with tinnitus, I arranged for a [facility] follow-up with him in a few weeks. Hector was very open to this idea. I also suggested the workbook called Mind Over Mood as a way to work with CBT, if he is interested. Hector will plan to keep his scheduled appointment with Dr Joel Landon at the end of April, for longer-term therapy support.       ROCAEL Moss, [facility]

## 2021-03-25 ENCOUNTER — OFFICE VISIT (OUTPATIENT)
Dept: AUDIOLOGY | Facility: CLINIC | Age: 51
End: 2021-03-25
Payer: COMMERCIAL

## 2021-03-25 DIAGNOSIS — H90.3 SENSORY HEARING LOSS, BILATERAL: Primary | ICD-10-CM

## 2021-03-25 DIAGNOSIS — D33.3 VESTIBULAR SCHWANNOMA (H): ICD-10-CM

## 2021-03-25 PROCEDURE — 92590 PR HEARING AID EXAM MONAURAL: CPT | Performed by: AUDIOLOGIST

## 2021-03-25 ASSESSMENT — ANXIETY QUESTIONNAIRES: GAD7 TOTAL SCORE: 18

## 2021-03-25 ASSESSMENT — PATIENT HEALTH QUESTIONNAIRE - PHQ9: SUM OF ALL RESPONSES TO PHQ QUESTIONS 1-9: 16

## 2021-03-25 NOTE — PROGRESS NOTES
AUDIOLOGY REPORT    SUBJECTIVE:   Fortino Polo is a 50 year old male was seen in the Audiology Clinic at  [facility] on 3/25/21 to discuss concerns with hearing and functional communication difficulties. Fortino has been seen previously on 3/5/21, and results revealed a normal hearing left ear and a normal sloping to severe sensorineural hearing loss. Following his hearing evaluation he was seen by ENT and MRI revealed a right vestibular schwannoma. Currently he reports difficulty with right tinnitus which has become constant. He is using white noise at home and reports some days are better than others. He has met with a therapist and began cognitive behavioral therapy surrounding both his tinnitus and tumor diagnosis. He will also be meeting with Health Psychology and has a Tinnitus Evaluation scheduled. He reports he is unsure at this time if they will monitor the schwannoma or if he will proceed with surgery. He reports that he does not perceive hearing loss as much as he is struggling with the tinnitus. He denies pain or dizziness.      OBJECTIVE:    Patient is a hearing aid candidate. Patient would like to move forward with a hearing aid evaluation today. Therefore, the patient was presented with different options for amplification to help aid in communication. Discussed styles, levels of technology and the access plan.    We discussed his needs for a hearing aid at length. Currently he is more concerned about the tinnitus than the hearing loss however I explained that amplification alone can sometimes help with the tinnitus. I also recommended a hearing aid that would provide different options for masking. He reported concerns about purchasing something now that he would no longer be able to use if he loses all the hearing in his right ear. We discussed CROS systems and I explained that if in the future he does lose hearing in the right ear to the point where  his hearing aid is unusable that with specific brands we would be able to convert it to be used on the left ear and purchase a CROS transmitter for the right. He was very interested in this option. We discussed the [employer] Evoke 330 and the Phonak Audeo B50, however the Phonak does not have a phone annita which he is interested in. He is going to discuss his options with his wife and also look into his [facility] options and will let me know how he would like to proceed.    We discussed tinnitus and that rather than his ear it is his brain's response to lack of sound. We discussed tinnitus and the goal of habituation through sound therapy for tinnitus treatment. We also discussed his upcoming tinnitus evaluation appointment and I explained that much of what would be covered in that appointment he is already pursuing (hearing aid masker, cognitive behavioral therapy, and health psychology) therefore it would only be necessary for him to complete that appointment if he wanted further tinnitus testing or counseling. He will think about it and cancel it if he feels that he does not need the additional support.      ASSESSMENT:     Reviewed purchase information and warranty information with patient. The 45 day trial period was explained to patient. The patient was given a copy of the [place] [employer] consumer brochure on purchasing hearing instruments. Patient risk factors have been provided to the patient in writing prior to the sale of the hearing aid per FDA regulation. The risk factors are also available in the User Instructional Booklet to be presented on the day of the hearing aid fitting. Hearing aid evaluation completed. Hearing aids will be ordered if patient decides to pursue them through our clinic.    PLAN:   Frotino will contact us with his decision and a hearing aid fitting will be scheduled at that time. Purchase agreement will be completed on the day of the fitting. Please contact  this clinic with any questions or concerns.      Yuri Palmer, [facility]  Licensed Audiologist  [place] License #[id number]

## 2021-04-01 ENCOUNTER — VIRTUAL VISIT (OUTPATIENT)
Dept: BEHAVIORAL HEALTH | Facility: CLINIC | Age: 51
End: 2021-04-01
Payer: COMMERCIAL

## 2021-04-01 DIAGNOSIS — F43.23 ADJUSTMENT DISORDER WITH MIXED ANXIETY AND DEPRESSED MOOD: Primary | ICD-10-CM

## 2021-04-01 PROCEDURE — 90832 PSYTX W PT 30 MINUTES: CPT | Mod: GT | Performed by: PSYCHOLOGIST
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
## 2021-04-14 ENCOUNTER — VIRTUAL VISIT (OUTPATIENT)
Dept: BEHAVIORAL HEALTH | Facility: CLINIC | Age: 51
End: 2021-04-14
Payer: COMMERCIAL

## 2021-04-14 DIAGNOSIS — F43.23 ADJUSTMENT DISORDER WITH MIXED ANXIETY AND DEPRESSED MOOD: Primary | ICD-10-CM

## 2021-04-14 PROCEDURE — 90832 PSYTX W PT 30 MINUTES: CPT | Mod: GT | Performed by: PSYCHOLOGIST
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
## 2021-04-14 NOTE — PATIENT INSTRUCTIONS
"Worry Time: Set aside a 15-20 minute worry period that will take place at the same time and place each day. During this period, using this worry log can help reduce the intensity of worry. The goal is not to get rid of worry altogether, but help reduce its intensity.     How to use the worry log below: Record all your worries during the worry time and take a moment to categorize them. You can choose categories that are helpful to you, such as \"family concerns,\" \"health concerns,\" or \"financial concerns.\" Any means of categorizing worries can be used, but try to limit the number of categories to three to seven. For the \"management strategy section, try to think of ways you could deal or cope with the worry or think more rationally about your concern. I've included an example below.                                                         Worry Log     Worrisome Thought: (e.g. I'll never quit smoking, its way too difficult\")     Category: (e.g. health concern)  - You can also categorize the type of thinking error that occurred (e.g. all-or-nothing thinking)    Management Strategy: (e.g. Quitting smoking is hard, but not impossible. I can try looking into online resources or ask my doctor about strategies or medications to help).         Warmly,     Amish Telles, LUPE   "

## 2021-04-19 ASSESSMENT — ENCOUNTER SYMPTOMS
HEMATOCHEZIA: 0
FREQUENCY: 0
MYALGIAS: 0
DYSURIA: 0
JOINT SWELLING: 0
NERVOUS/ANXIOUS: 1
HEADACHES: 0
COUGH: 0
ARTHRALGIAS: 0
DIARRHEA: 0
HEARTBURN: 0
PALPITATIONS: 0
NAUSEA: 0
ABDOMINAL PAIN: 0
CONSTIPATION: 0
EYE PAIN: 0
WEAKNESS: 0
DIZZINESS: 0
SORE THROAT: 0
CHILLS: 0
HEMATURIA: 0
SHORTNESS OF BREATH: 0
PARESTHESIAS: 0
FEVER: 0

## 2021-04-23 ENCOUNTER — OFFICE VISIT (OUTPATIENT)
Dept: FAMILY MEDICINE | Facility: CLINIC | Age: 51
End: 2021-04-23
Payer: COMMERCIAL

## 2021-04-23 VITALS
SYSTOLIC BLOOD PRESSURE: 150 MMHG | DIASTOLIC BLOOD PRESSURE: 96 MMHG | RESPIRATION RATE: 16 BRPM | HEIGHT: 72 IN | OXYGEN SATURATION: 97 % | HEART RATE: 85 BPM | BODY MASS INDEX: 29.12 KG/M2 | WEIGHT: 215 LBS | TEMPERATURE: 98.6 F

## 2021-04-23 DIAGNOSIS — F41.9 ANXIETY: ICD-10-CM

## 2021-04-23 DIAGNOSIS — F32.0 MILD MAJOR DEPRESSION (H): ICD-10-CM

## 2021-04-23 DIAGNOSIS — R03.0 ELEVATED BP WITHOUT DIAGNOSIS OF HYPERTENSION: ICD-10-CM

## 2021-04-23 DIAGNOSIS — D33.3 ACOUSTIC NEUROMA (H): ICD-10-CM

## 2021-04-23 DIAGNOSIS — E78.00 HIGH CHOLESTEROL: ICD-10-CM

## 2021-04-23 DIAGNOSIS — Z12.12 SCREENING FOR MALIGNANT NEOPLASM OF THE RECTUM: ICD-10-CM

## 2021-04-23 DIAGNOSIS — Z23 NEED FOR VACCINATION: ICD-10-CM

## 2021-04-23 DIAGNOSIS — Z00.00 ROUTINE GENERAL MEDICAL EXAMINATION AT A HEALTH CARE FACILITY: Primary | ICD-10-CM

## 2021-04-23 LAB
ANION GAP SERPL CALCULATED.3IONS-SCNC: 6 MMOL/L (ref 3–14)
BUN SERPL-MCNC: 13 MG/DL (ref 7–30)
CALCIUM SERPL-MCNC: 9.5 MG/DL (ref 8.5–10.1)
CHLORIDE SERPL-SCNC: 102 MMOL/L (ref 94–109)
CHOLEST SERPL-MCNC: 275 MG/DL
CO2 SERPL-SCNC: 29 MMOL/L (ref 20–32)
CREAT SERPL-MCNC: 1.15 MG/DL (ref 0.66–1.25)
GFR SERPL CREATININE-BSD FRML MDRD: 73 ML/MIN/{1.73_M2}
GLUCOSE SERPL-MCNC: 95 MG/DL (ref 70–99)
HDLC SERPL-MCNC: 27 MG/DL
LDLC SERPL CALC-MCNC: 172 MG/DL
NONHDLC SERPL-MCNC: 248 MG/DL
POTASSIUM SERPL-SCNC: 4.1 MMOL/L (ref 3.4–5.3)
SODIUM SERPL-SCNC: 137 MMOL/L (ref 133–144)
TRIGL SERPL-MCNC: 378 MG/DL

## 2021-04-23 PROCEDURE — 99396 PREV VISIT EST AGE 40-64: CPT | Mod: 25 | Performed by: PHYSICIAN ASSISTANT

## 2021-04-23 PROCEDURE — 90750 HZV VACC RECOMBINANT IM: CPT | Performed by: PHYSICIAN ASSISTANT

## 2021-04-23 PROCEDURE — 36415 COLL VENOUS BLD VENIPUNCTURE: CPT | Performed by: PHYSICIAN ASSISTANT

## 2021-04-23 PROCEDURE — 90471 IMMUNIZATION ADMIN: CPT | Performed by: PHYSICIAN ASSISTANT

## 2021-04-23 PROCEDURE — 80061 LIPID PANEL: CPT | Performed by: PHYSICIAN ASSISTANT

## 2021-04-23 PROCEDURE — 80048 BASIC METABOLIC PNL TOTAL CA: CPT | Performed by: PHYSICIAN ASSISTANT

## 2021-04-23 RX ORDER — ESCITALOPRAM OXALATE 10 MG/1
1 TABLET ORAL DAILY
COMMUNITY
Start: 2021-03-29 | End: 2022-04-28

## 2021-04-23 RX ORDER — HYDROXYZINE PAMOATE 25 MG/1
1 CAPSULE ORAL
COMMUNITY
Start: 2021-03-29 | End: 2021-07-15

## 2021-04-23 ASSESSMENT — MIFFLIN-ST. JEOR: SCORE: 1873.23

## 2021-04-23 ASSESSMENT — ENCOUNTER SYMPTOMS
JOINT SWELLING: 0
MYALGIAS: 0
DIARRHEA: 0
WEAKNESS: 0
ABDOMINAL PAIN: 0
HEMATOCHEZIA: 0
FREQUENCY: 0
SORE THROAT: 0
EYE PAIN: 0
DIZZINESS: 0
CONSTIPATION: 0
ARTHRALGIAS: 0
HEARTBURN: 0
PARESTHESIAS: 0
SHORTNESS OF BREATH: 0
PALPITATIONS: 0
FEVER: 0
HEADACHES: 0
HEMATURIA: 0
NERVOUS/ANXIOUS: 1
COUGH: 0
NAUSEA: 0
DYSURIA: 0
CHILLS: 0

## 2021-04-23 ASSESSMENT — PATIENT HEALTH QUESTIONNAIRE - PHQ9
10. IF YOU CHECKED OFF ANY PROBLEMS, HOW DIFFICULT HAVE THESE PROBLEMS MADE IT FOR YOU TO DO YOUR WORK, TAKE CARE OF THINGS AT HOME, OR GET ALONG WITH OTHER PEOPLE: SOMEWHAT DIFFICULT
SUM OF ALL RESPONSES TO PHQ QUESTIONS 1-9: 4
SUM OF ALL RESPONSES TO PHQ QUESTIONS 1-9: 4

## 2021-04-23 ASSESSMENT — ANXIETY QUESTIONNAIRES
1. FEELING NERVOUS, ANXIOUS, OR ON EDGE: SEVERAL DAYS
GAD7 TOTAL SCORE: 3
2. NOT BEING ABLE TO STOP OR CONTROL WORRYING: SEVERAL DAYS
4. TROUBLE RELAXING: NOT AT ALL
3. WORRYING TOO MUCH ABOUT DIFFERENT THINGS: SEVERAL DAYS
7. FEELING AFRAID AS IF SOMETHING AWFUL MIGHT HAPPEN: NOT AT ALL
GAD7 TOTAL SCORE: 3
7. FEELING AFRAID AS IF SOMETHING AWFUL MIGHT HAPPEN: NOT AT ALL
GAD7 TOTAL SCORE: 3
6. BECOMING EASILY ANNOYED OR IRRITABLE: NOT AT ALL
5. BEING SO RESTLESS THAT IT IS HARD TO SIT STILL: NOT AT ALL

## 2021-04-23 NOTE — NURSING NOTE
Prior to immunization administration, verified patients identity using patient s name and date of birth. Please see Immunization Activity for additional information.     Screening Questionnaire for Adult Immunization    Are you sick today?   No   Do you have allergies to medications, food, a vaccine component or latex?   No   Have you ever had a serious reaction after receiving a vaccination?   No   Do you have a long-term health problem with heart, lung, kidney, or metabolic disease (e.g., diabetes), asthma, a blood disorder, no spleen, complement component deficiency, a cochlear implant, or a spinal fluid leak?  Are you on long-term aspirin therapy?   No   Do you have cancer, leukemia, HIV/AIDS, or any other immune system problem?   No   Do you have a parent, brother, or sister with an immune system problem?   No   In the past 3 months, have you taken medications that affect  your immune system, such as prednisone, other steroids, or anticancer drugs; drugs for the treatment of rheumatoid arthritis, Crohn s disease, or psoriasis; or have you had radiation treatments?   No   Have you had a seizure, or a brain or other nervous system problem?   No   During the past year, have you received a transfusion of blood or blood    products, or been given immune (gamma) globulin or antiviral drug?   No   For women: Are you pregnant or is there a chance you could become       pregnant during the next month?   No   Have you received any vaccinations in the past 4 weeks?   Yes - covid vaccine over 2 weeks.      Immunization questionnaire answers were all negative.        Per orders of ADO, injection of Shingrix given by Omaira Kinsey CMA. Patient instructed to remain in clinic for 15 minutes afterwards, and to report any adverse reaction to me immediately.       Screening performed by Omaira Kinsey CMA on 4/23/2021 at 10:13 AM.

## 2021-04-23 NOTE — PROGRESS NOTES
SUBJECTIVE:   CC: Fortino Polo is an 50 year old male who presents for preventative health visit.       Patient has been advised of split billing requirements and indicates understanding: Yes  Healthy Habits:     Getting at least 3 servings of Calcium per day:  Yes    Bi-annual eye exam:  Yes    Dental care twice a year:  Yes    Sleep apnea or symptoms of sleep apnea:  None    Diet:  Regular (no restrictions)    Frequency of exercise:  2-3 days/week    Duration of exercise:  30-45 minutes    Taking medications regularly:  Yes    Medication side effects:  None    PHQ-2 Total Score: 2    Additional concerns today:  No      No concerns   He has a history of anxiety and depression is seen outside clinic for this and counseling.  He states doing well.  He states his anxiety is been much higher since he had a recent diagnostic of an acoustic neuroma.  He is following up with ENT for this.  He feels like that is why his blood pressures been elevated.  He denies any chest pain or shortness of breath.    Today's PHQ-2 Score:   PHQ-2 ( 1999 Pfizer) 4/19/2021   Q1: Little interest or pleasure in doing things 1   Q2: Feeling down, depressed or hopeless 1   PHQ-2 Score 2   Q1: Little interest or pleasure in doing things Several days   Q2: Feeling down, depressed or hopeless Several days   PHQ-2 Score 2       Abuse: Current or Past(Physical, Sexual or Emotional)- No  Do you feel safe in your environment? Yes      Social History     Tobacco Use     Smoking status: Never Smoker     Smokeless tobacco: Former User   Substance Use Topics     Alcohol use: Yes     Frequency: 2-3 times a week     Drinks per session: 3 or 4     Binge frequency: Weekly     Comment: weekends beer         Alcohol Use 4/19/2021   Prescreen: >3 drinks/day or >7 drinks/week? Yes   Prescreen: >3 drinks/day or >7 drinks/week? -   AUDIT SCORE  3       Last PSA: No results found for: PSA    Reviewed orders with patient. Reviewed health maintenance and updated  orders accordingly - Yes  Lab work is in process  Labs reviewed in EPIC  BP Readings from Last 3 Encounters:   21 (!) 150/96   21 (!) 148/96   21 130/82    Wt Readings from Last 3 Encounters:   21 97.5 kg (215 lb)   21 97 kg (213 lb 13.5 oz)   21 98.4 kg (217 lb)                  Patient Active Problem List   Diagnosis     CARDIOVASCULAR SCREENING; LDL GOAL LESS THAN 160     BMI 29.0-29.9,adult     SNHL (sensory-neural hearing loss), asymmetrical     Tinnitus, right     Anxiety     Mild major depression (H)     Acoustic neuroma (H)     Past Surgical History:   Procedure Laterality Date     DRAIN PILONIDAL CYST SIMPL       REMOVAL OF NAIL PLATE         Social History     Tobacco Use     Smoking status: Never Smoker     Smokeless tobacco: Former User   Substance Use Topics     Alcohol use: Yes     Frequency: 2-3 times a week     Drinks per session: 3 or 4     Binge frequency: Weekly     Comment: weekends beer     Family History   Problem Relation Age of Onset     Hypertension Mother      Heart Disease Father         PFO     Cerebrovascular Disease Father          81 possible pneumonia     Genitourinary Problems Sister         liver          Current Outpatient Medications   Medication Sig Dispense Refill     cetirizine (ZYRTEC) 10 MG tablet Take 10 mg by mouth daily       escitalopram (LEXAPRO) 10 MG tablet Take 1 tablet by mouth daily       hydrOXYzine (VISTARIL) 25 MG capsule Take 1 capsule by mouth nightly as needed       Allergies   Allergen Reactions     Nkda [No Known Drug Allergies]      Seasonal Allergies        Reviewed and updated as needed this visit by clinical staff  Tobacco  Allergies  Meds   Med Hx  Surg Hx  Fam Hx  Soc Hx        Reviewed and updated as needed this visit by Provider                  Past Medical History:   Diagnosis Date     Condyloma      Dyslipidemia      Pilonidal cyst       Past Surgical History:   Procedure Laterality Date     DRAIN  PILONIDAL CYST SIMPL       REMOVAL OF NAIL PLATE         Review of Systems   Constitutional: Negative for chills and fever.   HENT: Positive for ear pain and hearing loss. Negative for congestion and sore throat.    Eyes: Negative for pain and visual disturbance.   Respiratory: Negative for cough and shortness of breath.    Cardiovascular: Negative for chest pain, palpitations and peripheral edema.   Gastrointestinal: Negative for abdominal pain, constipation, diarrhea, heartburn, hematochezia and nausea.   Genitourinary: Negative for discharge, dysuria, frequency, genital sores, hematuria, impotence and urgency.   Musculoskeletal: Negative for arthralgias, joint swelling and myalgias.   Skin: Negative for rash.   Neurological: Negative for dizziness, weakness, headaches and paresthesias.   Psychiatric/Behavioral: Negative for mood changes. The patient is nervous/anxious.          OBJECTIVE:   BP (!) 150/96   Pulse 85   Temp 98.6  F (37  C) (Tympanic)   Resp 16   Ht 1.829 m (6')   Wt 97.5 kg (215 lb)   SpO2 97%   BMI 29.16 kg/m      Physical Exam  GENERAL: healthy, alert and no distress  EYES: Eyes grossly normal to inspection, PERRL and conjunctivae and sclerae normal  HENT: ear canals and TM's normal, nose and mouth without ulcers or lesions  NECK: no adenopathy, no asymmetry, masses, or scars and thyroid normal to palpation  RESP: lungs clear to auscultation - no rales, rhonchi or wheezes  CV: regular rate and rhythm, normal S1 S2, no S3 or S4, no murmur, click or rub, no peripheral edema and peripheral pulses strong  ABDOMEN: soft, nontender, no hepatosplenomegaly, no masses and bowel sounds normal   (male): normal male genitalia without lesions or urethral discharge, no hernia  MS: no gross musculoskeletal defects noted, no edema  SKIN: no suspicious lesions or rashes  NEURO: Normal strength and tone, mentation intact and speech normal  PSYCH: mentation appears normal, affect  normal/bright    Diagnostic Test Results:  Labs reviewed in Epic    ASSESSMENT/PLAN:       ICD-10-CM    1. Routine general medical examination at a health care facility  Z00.00 Lipid panel reflex to direct LDL Fasting     Basic metabolic panel   2. Anxiety  F41.9    3. Mild major depression (H)  F32.0    4. Acoustic neuroma (H)  D33.3    5. Elevated BP without diagnosis of hypertension  R03.0    6. Screening for malignant neoplasm of the rectum  Z12.12 GASTROENTEROLOGY ADULT REF PROCEDURE ONLY   7. Need for vaccination  Z23 SHINGRIX [[id number]]       Patient has been advised of split billing requirements and indicates understanding: Yes  COUNSELING:   Reviewed preventive health counseling, as reflected in patient instructions       Regular exercise       Healthy diet/nutrition       Colon cancer screening    Estimated body mass index is 29.16 kg/m  as calculated from the following:    Height as of this encounter: 1.829 m (6').    Weight as of this encounter: 97.5 kg (215 lb).     Weight management plan: Discussed healthy diet and exercise guidelines    He reports that he has never smoked. He has quit using smokeless tobacco.      Counseling Resources:  ATP IV Guidelines  Pooled Cohorts Equation Calculator  FRAX Risk Assessment  ICSI Preventive Guidelines  Dietary Guidelines for Americans, 2010  USDA's MyPlate  ASA Prophylaxis  Lung CA Screening    [facility]IMBER[facility]

## 2021-04-23 NOTE — LETTER
My Depression Action Plan  Name: Fortino Polo   Date of Birth 1970  Date: 4/23/2021    My doctor: Vilma Arriaga   [facility]  [address] [facility] [address] [phone]          GREEN    ZONE   Good Control    What it looks like:     Things are going generally well. You have normal ups and downs. You may even feel depressed from time to time, but bad moods usually last less than a day.   What you need to do:  1. Continue to care for yourself (see self care plan)  2. Check your depression survival kit and update it as needed  3. Follow your physician s recommendations including any medication.  4. Do not stop taking medication unless you consult with your physician first.           YELLOW         ZONE Getting Worse    What it looks like:     Depression is starting to interfere with your life.     It may be hard to get out of bed; you may be starting to isolate yourself from others.    Symptoms of depression are starting to last most all day and this has happened for several days.     You may have suicidal thoughts but they are not constant.   What you need to do:     1. Call your care team. Your response to treatment will improve if you keep your care team informed of your progress. Yellow periods are signs an adjustment may need to be made.     2. Continue your self-care.  Just get dressed and ready for the day.  Don't give yourself time to talk yourself out of it.    3. Talk to someone in your support network.    4. Open up your Depression Self-Care Plan/Wellness Kit.           RED    ZONE Medical Alert - Get Help    What it looks like:     Depression is seriously interfering with your life.     You may experience these or other symptoms: You can t get out of bed most days, can t work or engage in other necessary activities, you have trouble taking care of basic hygiene, or basic responsibilities, thoughts of suicide or death that will  not go away, self-injurious behavior.     What you need to do:  1. Call your care team and request a same-day appointment. If they are not available (weekends or after hours) call your local crisis line, emergency room or 911.          Depression Self-Care Plan / Wellness Kit    Many people find that medication and therapy are helpful treatments for managing depression. In addition, making small changes to your everyday life can help to boost your mood and improve your wellbeing. Below are some tips for you to consider. Be sure to talk with your medical provider and/or behavioral health consultant if your symptoms are worsening or not improving.     Sleep   Sleep hygiene  means all of the habits that support good, restful sleep. It includes maintaining a consistent bedtime and wake time, using your bedroom only for sleeping or sex, and keeping the bedroom dark and free of distractions like a computer, smartphone, or television.     Develop a Healthy Routine  Maintain good hygiene. Get out of bed in the morning, make your bed, brush your teeth, take a shower, and get dressed. Don t spend too much time viewing media that makes you feel stressed. Find time to relax each day.    Exercise  Get some form of exercise every day. This will help reduce pain and release endorphins, the  feel good  chemicals in your brain. It can be as simple as just going for a walk or doing some gardening, anything that will get you moving.      Diet  Strive to eat healthy foods, including fruits and vegetables. Drink plenty of water. Avoid excessive sugar, caffeine, alcohol, and other mood-altering substances.     Stay Connected with Others  Stay in touch with friends and family members.    Manage Your Mood  Try deep breathing, massage therapy, biofeedback, or meditation. Take part in fun activities when you can. Try to find something to smile about each day.     Psychotherapy  Be open to working with a therapist if your provider recommends  it.     Medication  Be sure to take your medication as prescribed. Most anti-depressants need to be taken every day. It usually takes several weeks for medications to work. Not all medicines work for all people. It is important to follow-up with your provider to make sure you have a treatment plan that is working for you. Do not stop your medication abruptly without first discussing it with your provider.    Crisis Resources   These hotlines are for both adults and children. They and are open 24 hours a day, 7 days a week unless noted otherwise.      National Suicide Prevention Lifeline   1-264-050-TALK (3602)      Crisis Text Line    www.crisistextline.org  Text HOME to 833294 from anywhere in the [place], anytime, about any type of crisis. A live, trained crisis counselor will receive the text and respond quickly.      [facility] Lifeline for LGBTQ Youth  A national crisis intervention and suicide lifeline for LGBTQ youth under 25. Provides a safe place to talk without judgement. Call 1-231.771.1209; text START to 373813 or visit www.thetrevorproject.org to talk to a trained counselor.      For [facility] crisis numbers, visit the [facility] website at:  https://[place].gov/dhs/people-we-serve/adults/health-care/mental-health/resources/crisis-contacts.jsp

## 2021-04-24 ASSESSMENT — ANXIETY QUESTIONNAIRES: GAD7 TOTAL SCORE: 3

## 2021-04-24 ASSESSMENT — PATIENT HEALTH QUESTIONNAIRE - PHQ9: SUM OF ALL RESPONSES TO PHQ QUESTIONS 1-9: 4

## 2021-04-27 ENCOUNTER — VIRTUAL VISIT (OUTPATIENT)
Dept: PSYCHOLOGY | Facility: CLINIC | Age: 51
End: 2021-04-27
Attending: OTOLARYNGOLOGY
Payer: COMMERCIAL

## 2021-04-27 DIAGNOSIS — H93.11 TINNITUS OF RIGHT EAR: ICD-10-CM

## 2021-04-27 DIAGNOSIS — F43.23 ADJUSTMENT DISORDER WITH MIXED ANXIETY AND DEPRESSED MOOD: Primary | ICD-10-CM

## 2021-04-27 PROBLEM — E78.00 HIGH CHOLESTEROL: Status: ACTIVE | Noted: 2021-04-27

## 2021-04-27 PROCEDURE — 90791 PSYCH DIAGNOSTIC EVALUATION: CPT | Mod: GT | Performed by: PSYCHOLOGIST

## 2021-04-27 RX ORDER — ATORVASTATIN CALCIUM 20 MG/1
20 TABLET, FILM COATED ORAL DAILY
Qty: 90 TABLET | Refills: 1 | Status: SHIPPED | OUTPATIENT
Start: 2021-04-27 | End: 2021-06-21

## 2021-04-27 NOTE — PROGRESS NOTES
Health Psychology                     Department of Medicine  Meghan Mcmillan, Ph.D., L.P. [phone]                          [facility] Angelina Terrell, Ph.D., L.P. [phone]                   [place] Mail Code 742   JasmyneIsaak, Ph.D., L.P. [phone]       [address] [place] [address] Theodora Le, Ph.D., L.P. [phone]         [place] [address]           Ruben Landon, Ph.D., A.B.P.P., L.P. [phone]     Sameera Martini, Ph.D., L.P. [phone]  [address]     Confidential Summary of Health Psychology Telemental Health Evaluation*    Referral Source: Shirley Zuniga M.D.    Reason for Referral: Mr. Polo is a 50-year-old white  gentleman referred for assistance in coping with tinnitus.    History of Presenting Concerns: Mr. Polo reports that he woke up in February noticing some tinnitus, saw his primary care provider, who referred him to an otolaryngologist in [place].  He was found to have hearing loss in the right ear and an MRI revealed an acoustic neuroma.  He is now considering what kind of treatment to pursue for the acoustic neuroma.    At first, he was not too bothered by the tinnitus.  He attempted to treated with Flonase which did not help and it began to bother him more when he learned that he had experienced a hearing loss.  The sound is described as a high-pitched steady hum.  He sometimes hears some variation in pitch, or experiences a sense of a whooshing in sound or tinny sound.  It is just in his right ear.  His left ear is described as having excellent hearing. He sometimes experiences the tinnitus worse when he is in quiet environments.  He has noticed that at times he becomes engrossed in his attention to activities and at those times is less aware of the tinnitus.  The tinnitus is perceived as sometimes less pronounced in the morning.  He has noticed it seems worse if he is stressed, worried, or more focused on it.   He is not sure whether exposure to loud sounds intensifies it, but he is taking precautions to minimize his exposure to loud sounds as he is now very conscious of the risk of losing hearing.  He has a couple of types of ear protection.  He has many questions as to whether he will be able to enjoy loud environments such as [employer] games and has perceived the tinnitus to be a threat to his quality of life and to reflect loss of control.  He feels that he has been learning to cope with it better over time,  And realizes he had been particularly upset at the time of diagnosis.     Medical History: Mr. Polo reports that he is generally in good health.  He reports that he has had ear problems in the past associated with allergies.  There is no familial history of tinnitus.  He denies personal history of trauma to the ears, head, neck.  There is no history of loss of consciousness or seizures.  He is in the process of getting a hearing aid with a masker.  He reports that he was diagnosed with hypertension following onset of the tinnitus.  He is also being treated for hypercholesteremia.  He is generally not in pain though has noticed occasional aching in the right ear.  Alcohol use is described as 5-7 drinks on a weekend.  Caffeine intake is approximately 2 cups of coffee every morning.  He tried to cut it out but did not see any impact on the tinnitus so resumed.  He denies use of street drugs.  He is in the process of increasing his exercise, using the P90X.    Past Medical History:   Diagnosis Date     Condyloma      Dyslipidemia      Pilonidal cyst      Past Surgical History:   Procedure Laterality Date     DRAIN PILONIDAL CYST SIMPL       REMOVAL OF NAIL PLATE       Current Outpatient Medications   Medication     atorvastatin (LIPITOR) 20 MG tablet     cetirizine (ZYRTEC) 10 MG tablet     escitalopram (LEXAPRO) 10 MG tablet     hydrOXYzine (VISTARIL) 25 MG capsule     No current facility-administered medications  "for this visit.      Wt Readings from Last 4 Encounters:   21 97.5 kg (215 lb)   21 97 kg (213 lb 13.5 oz)   21 98.4 kg (217 lb)   21 98.9 kg (218 lb)     Estimated body mass index is 29.16 kg/m  as calculated from the following:    Height as of 21: 1.829 m (6').    Weight as of 21: 97.5 kg (215 lb).    Social History: Mr. Polo grew up in [place].  His father  at age 81 of a stroke, following a series of strokes over 2 years.  His mother is alive, adapting to being a .  He has 2 sisters.  He reports he was a good family to grow up in.  He obtained a bachelor's degree in journalism at [employer].  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  He is  to his wife.  They have 15 and 14-year-old sons.  He reports he is a going generally well at home though it has been stressful with Covid.  The signs of both return to school.  The younger son has some difficulties with ADHD.  He acknowledges current stressors as Covid, tinnitus and his acoustic neuroma.  Recreations include fishing, travel, reading, activity with his sons.  He describes himself as a news junky and a homebody.    Psychiatric History: Mr. Polo had not sought any kind of psychiatric care until experiencing the tinnitus.  He is now met with Jordan Mulligan MA and Rambo Quintanilla D. during the interval from when he first tried to contact me to schedule an appointment.  He found those meetings to be helpful in addressing the acute anxiety that he had been experiencing.  He was started on amitriptyline but dislikes side effects of dry mouth, fatigue and \"feeling like a zombie.\"  He was seen at [facility] and they switched medications to escitalopram which she has been taking for a couple of weeks. They are not on [facility] so I don't have further details.  He is feeling better on it.  He denies any " history of psychiatric hospitalizations, chemical dependency, or suicide attempt.    Psychological Screening: Mr. Polo completed the following four screening measures.    CAGE-AID Score: > 1 is a positive screen, suggesting further discussion is needed to determine if evaluation for alcohol or substance abuse is appropriate.  A score > 2 is considered clinically significant, suggesting further evaluation of alcohol or substance-related problems is indicated.  CAGE-AID Total Score 3/24/2021 4/23/2021   Total Score 1 1   Total Score MyChart 1 (A total score of 2 or greater is considered clinically significant) 1 (A total score of 2 or greater is considered clinically significant)     ROSEANNA-7 Score:  The General Anxiety Disorder-7 is an an anxiety screening instrument. Scores of 5, 10, and 15 respectively indicate mild, moderate, and severe anxiety symptoms.  ROSEANNA-7 SCORE 3/24/2021 4/23/2021   Total Score 18 (severe anxiety) 3 (minimal anxiety)   Total Score 18 3       PHQ-9 Score:  The Patient Health Questionnaire-9 is a depression screening instrument. Scores of 5, 10, 15, and 20 respectively indicate mild, moderate, moderately severe and severe depression symptoms.     PHQ-9 SCORE 3/16/2021 3/24/2021 4/23/2021   PHQ-9 Total Score MyChart - 16 (Moderately severe depression) 4 (Minimal depression)   PHQ-9 Total Score 16 16 4     WHODAS-12 Score:  WHODAS 2.0 Total Score 4/23/2021   Total Score 22   Total Score MyChart 22     Mental Status/Interview: Mr. Polo was punctual for today's appointment.  He is alert, oriented, in no acute distress.  Thoughts are linear.  He appears to be an excellent historian.  He feels that he is now coping better than he had been, acknowledging that he had been very upset at the time of diagnosis.  He describes himself as generally someone who is even keeled.  It was unusual for him to be as distressed as he had become.  He is able to enjoy activities.  He had been feeling somewhat  hopeless and has been feeling guilty about the impact of his tinnitus and acoustic neuroma on his family.  He denies feeling helpless or worthless.  Energy is good.  Sleep was initially difficult, but is now improved.  Appetite is good. He is trying to change his diet due to his cholesterol.  He denies problems with concentration, memory or decision-making.  He denies hallucinations and delusions.  He denies suicidal and homicidal ideation.  Overall, he is becoming more hopeful and his anxiety level has decreased considerably.  During the meeting, we spent about 10 to 15 minutes discussing strategies for distracting himself from the tinnitus, and becoming more absorbed in other activities.  This gives him a greater sense of control and he is already tried doing this, but found it helpful to discuss.  Rapport was positive.    Impression: Mr. Polo is a pleasant gentleman who was having an acute stress reaction to his tinnitus and acoustic neuroma.  He has been seeing behavioral health clinicians in the [facility] and at [facility], and appears to have responded well to their interventions.  Today, his anxiety and depression levels are well below where they were a month ago.    Diagnosis:  Adjustment disorder with mixed anxiety and depressed mood (F4 3.33)  Tinnitus of right ear (H 93.11)    This telehealth service is appropriate and effective for delivering services in light of the necessity for social distancing to mitigate the COVID-19 epidemic and for conservation of PPE.     Patient has agreed to receiving telehealth services after being informed about it: Yes    Patient prefers video invitation/information to be sent by:   email    Time service started: 1:05  Time service ended: 2:11    Mode of transmission:  [employer]    Location of originating:  Home of the patient    Distance site:  Home office of provider for [facility]    The patient has been notified that:  Video visits will be conducted via a call with their  psychologist to provide the care they need with a video conversation. Video visits may be billed at different rates depending on insurance coverage.  Patients are advised to please contact their insurance provider with any questions about their health insurance coverage. If during the course of a call the psychologist feels a video visit is not appropriate, patients will not be charged for this service.    Recommendations/Plan: Mr. Polo would like return for video visit but is unsure when, since he is about to go on vacation.  He will contat me about rescheduling.         Thank you for this opportunity to participate in your care of this patient.    Ruben Landon, Ph.D., A.B.P.P., L.P.  Director, Health Psychology    cc: Shirley Zuniga M.D.    *In accordance with the Rules of the [place] Board of [employer], it is noted that psychological descriptions and scientific procedures underlying psychological evaluations have limitations.  Absolute predictions cannot be made based on information in this report. An information sheet describing informed consent, limits to confidentiality, clinic scheduling and practices, and feedback from patients was given to the patient.  This note is dictated utilizing voice recognition software. Unfortunately this leads to occasional typographical errors. I apologize in advance if this occurs.  If questions arise, please do not hesitate to contact the author.

## 2021-05-25 ENCOUNTER — OFFICE VISIT (OUTPATIENT)
Dept: FAMILY MEDICINE | Facility: CLINIC | Age: 51
End: 2021-05-25
Payer: COMMERCIAL

## 2021-05-25 ENCOUNTER — ANCILLARY PROCEDURE (OUTPATIENT)
Dept: GENERAL RADIOLOGY | Facility: CLINIC | Age: 51
End: 2021-05-25
Attending: FAMILY MEDICINE
Payer: COMMERCIAL

## 2021-05-25 VITALS
TEMPERATURE: 97.3 F | OXYGEN SATURATION: 96 % | WEIGHT: 216 LBS | DIASTOLIC BLOOD PRESSURE: 81 MMHG | SYSTOLIC BLOOD PRESSURE: 132 MMHG | HEART RATE: 74 BPM | BODY MASS INDEX: 29.29 KG/M2

## 2021-05-25 DIAGNOSIS — R07.89 LEFT-SIDED CHEST WALL PAIN: Primary | ICD-10-CM

## 2021-05-25 PROCEDURE — 71101 X-RAY EXAM UNILAT RIBS/CHEST: CPT | Mod: LT | Performed by: RADIOLOGY

## 2021-05-25 PROCEDURE — 99214 OFFICE O/P EST MOD 30 MIN: CPT | Performed by: FAMILY MEDICINE

## 2021-05-25 ASSESSMENT — PAIN SCALES - GENERAL: PAINLEVEL: SEVERE PAIN (7)

## 2021-05-25 NOTE — PROGRESS NOTES
SUBJECTIVE:  Fortino Polo is a 50 year old male who presents to the office with the CC of chest pain.  Patient complains of pain on the left.  The pain is characterized as sharp pain  located on the lateral chest without radiation. Symptoms began 6 day(s) ago, sudden onset but got gradually worse.  Pain is associated with none.  Pain is exacerbated by movement and lying down, coughing or sneezing    Pain is relieved by Biofreeze and some acetaminophen  .  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   He was feeling in his normal state of health prior to this       Past Medical History:   Diagnosis Date     Condyloma      Dyslipidemia      Pilonidal cyst          Current Outpatient Medications:      atorvastatin (LIPITOR) 20 MG tablet, Take 1 tablet (20 mg) by mouth daily, Disp: 90 tablet, Rfl: 1     cetirizine (ZYRTEC) 10 MG tablet, Take 10 mg by mouth daily, Disp: , Rfl:      escitalopram (LEXAPRO) 10 MG tablet, Take 1 tablet by mouth daily, Disp: , Rfl:      hydrOXYzine (VISTARIL) 25 MG capsule, Take 1 capsule by mouth nightly as needed, Disp: , Rfl:     Social History     Tobacco Use     Smoking status: Never Smoker     Smokeless tobacco: Former User   Substance Use Topics     Alcohol use: Yes     Frequency: 2-3 times a week     Drinks per session: 3 or 4     Binge frequency: Weekly     Comment: weekends beer       ROS:CONSTITUTIONAL:NEGATIVE for fever, chills, change in weight    EXAM:  /81   Pulse 74   Temp 97.3  F (36.3  C) (Tympanic)   Wt 98 kg (216 lb)   SpO2 96%   BMI 29.29 kg/m    GENERAL APPEARANCE: healthy, alert and no distress  EYES: EOMI,  PERRL, conjunctiva clear  HENT: ear canals and TM's normal.  Nose and mouth without ulcers,  erythema or lesions  NECK: supple, nontender, no lymphadenopathy  RESP: lungs clear to auscultation - no rales, rhonchi or wheezes  CV: regular rates and rhythm, normal S1 S2, no murmur noted  Chest Wall: moderate tenderness to palpation of the lateral lower chest wall.   Pressure applied to the chest in the AP direction induced pain in the lateral left chest area  ABDOMEN:  soft, nontender, no HSM or masses and bowel sounds normal  NEURO: Normal strength and tone, sensory exam grossly normal,  normal speech and mentation  SKIN: no suspicious lesions or rashes       Chest Xray showed no infiltrates or cardiomegaly. The costophrenic angles were sharp. No rib fractures      Assessment  / IMPRESSION  Chest wall pain secondary to overuse or strain    PLAN:See orders in epic  Advance activity as tolerated   Patient Instructions   Take 800 mg of ibuprofen three times a day(s) with food as needed for the pain     Follow up with Zbigniew Garcia as planned for cholesterol recheck in 3 month(s)

## 2021-05-25 NOTE — NURSING NOTE
Chief Complaint   Patient presents with     Pain     left rib area, doesn't remember doing anything to it. hurts to lay down, and taking a deep breath, noticed it Wednesday       Initial /81   Pulse 74   Temp 97.3  F (36.3  C) (Tympanic)   Wt 98 kg (216 lb)   SpO2 96%   BMI 29.29 kg/m   Estimated body mass index is 29.29 kg/m  as calculated from the following:    Height as of 4/23/21: 1.829 m (6').    Weight as of this encounter: 98 kg (216 lb).  Medication Reconciliation: complete  Melissa Og, CMA

## 2021-05-25 NOTE — Clinical Note
Patient had an appointment(s) today and wanted metatarsal to forward the chart as his blood pressure was better today. He is going to cancel his appointment(s) later this week for a blood pressure recheck He is going to follow up with you in a few month(s) for his cholesterol

## 2021-05-26 ENCOUNTER — TELEPHONE (OUTPATIENT)
Dept: OTOLARYNGOLOGY | Facility: CLINIC | Age: 51
End: 2021-05-26

## 2021-06-10 NOTE — TELEPHONE ENCOUNTER
[facility] Call Center    Phone Message    May a detailed message be left on voicemail: yes     Reason for Call: Other: Pt would like Dr. Zuniga or nurse to help him understand all the referrals she put in for him. Please call back ASAP. Thank you.     Action Taken: Message routed to:  [facility] ([facility]    Travel Screening: Not Applicable                                                                         black stools/abnormal lab result

## 2021-06-11 ENCOUNTER — TELEPHONE (OUTPATIENT)
Dept: OTOLARYNGOLOGY | Facility: CLINIC | Age: 51
End: 2021-06-11

## 2021-06-21 ENCOUNTER — MYC MEDICAL ADVICE (OUTPATIENT)
Dept: FAMILY MEDICINE | Facility: CLINIC | Age: 51
End: 2021-06-21

## 2021-06-21 DIAGNOSIS — E78.00 HIGH CHOLESTEROL: Primary | ICD-10-CM

## 2021-06-21 RX ORDER — ATORVASTATIN CALCIUM 20 MG/1
20 TABLET, FILM COATED ORAL DAILY
Qty: 90 TABLET | Refills: 0 | Status: SHIPPED | OUTPATIENT
Start: 2021-06-21 | End: 2021-09-23

## 2021-06-24 ENCOUNTER — ALLIED HEALTH/NURSE VISIT (OUTPATIENT)
Dept: NURSING | Facility: CLINIC | Age: 51
End: 2021-06-24
Payer: COMMERCIAL

## 2021-06-24 DIAGNOSIS — Z23 NEED FOR SHINGLES VACCINE: Primary | ICD-10-CM

## 2021-06-24 PROCEDURE — 90750 HZV VACC RECOMBINANT IM: CPT

## 2021-06-24 PROCEDURE — 90471 IMMUNIZATION ADMIN: CPT

## 2021-07-07 NOTE — TELEPHONE ENCOUNTER
Patient called back and scheduled a lab appointment for 7/28 but there are no orders.  Can someone please place orders for this patient?

## 2021-07-12 ENCOUNTER — MYC MEDICAL ADVICE (OUTPATIENT)
Dept: OTOLARYNGOLOGY | Facility: CLINIC | Age: 51
End: 2021-07-12

## 2021-07-12 NOTE — TELEPHONE ENCOUNTER
Spoke with patient. Symptoms started about 1 week ago. His hearing is about the same, which he is pleased with. He describes feeling slightly tired and lightheaded, with some mild nausea, loss of appetite, and dry mouth. Symptoms are worse in the morning and improve throughout the day. Denies vertigo, denies vomiting. Goes for a run in the afternoons and denies any difficulty with balance during his run. He went to [facility] in May and found it difficult to balance on a paddleboard, and states he might rarely take a step in the wrong direction when walking, but otherwise does not notice any significant changes in balance.    He states he radically changed his diet in April - cut out processed foods, red meat, and a lot of salt. He used to have elevated BP, his last reading was normal. Advised him to follow up with PCP to rule out other causes of symptoms. Offered to move up his MRI and follow up with Dr. Zuniga and Dr. Hunter, he feels comfortable waiting to see what he finds out through his PCP. He will call if he wants to move up his MRI and appt from September to August.    Malinda Layne, RN  RN Care Coordinator, Skull Base Surgery  Direct: [phone]

## 2021-07-12 NOTE — TELEPHONE ENCOUNTER
Called patient and left voicemail, provided direct callback number.    Malinda Layne, RN  RN Care Coordinator, Skull Base Surgery  Direct: [phone]        Shirley Zuniga MD Paisar, Kelly, RN  Phone Number: [phone]   Benjamin Petty and Theodora,   Would you be able to connect with this gentleman by phone to learn more?   We would recommend that he see his PCP, as other causes should be investigated first rather than assuming it is the tumor, as many patients have no balance symptoms.   It is fine to do the MRI a month sooner if he would like to.   We do not recommend medications as, if the tumor is the cause, it would delay natural compensation.   Thanks   Shirley

## 2021-07-15 ENCOUNTER — OFFICE VISIT (OUTPATIENT)
Dept: FAMILY MEDICINE | Facility: CLINIC | Age: 51
End: 2021-07-15
Payer: COMMERCIAL

## 2021-07-15 VITALS
OXYGEN SATURATION: 99 % | SYSTOLIC BLOOD PRESSURE: 133 MMHG | HEIGHT: 72 IN | DIASTOLIC BLOOD PRESSURE: 82 MMHG | BODY MASS INDEX: 27.36 KG/M2 | WEIGHT: 202 LBS | HEART RATE: 71 BPM | RESPIRATION RATE: 16 BRPM | TEMPERATURE: 97.2 F

## 2021-07-15 DIAGNOSIS — R53.83 FATIGUE, UNSPECIFIED TYPE: Primary | ICD-10-CM

## 2021-07-15 DIAGNOSIS — R11.0 NAUSEA: ICD-10-CM

## 2021-07-15 DIAGNOSIS — E78.00 HIGH CHOLESTEROL: ICD-10-CM

## 2021-07-15 DIAGNOSIS — R19.7 DIARRHEA, UNSPECIFIED TYPE: ICD-10-CM

## 2021-07-15 LAB
ALBUMIN SERPL-MCNC: 4.4 G/DL (ref 3.4–5)
ALP SERPL-CCNC: 49 U/L (ref 40–150)
ALT SERPL W P-5'-P-CCNC: 40 U/L (ref 0–70)
ANION GAP SERPL CALCULATED.3IONS-SCNC: 4 MMOL/L (ref 3–14)
AST SERPL W P-5'-P-CCNC: 22 U/L (ref 0–45)
BASOPHILS # BLD AUTO: 0 10E3/UL (ref 0–0.2)
BASOPHILS NFR BLD AUTO: 0 %
BILIRUB SERPL-MCNC: 0.7 MG/DL (ref 0.2–1.3)
BUN SERPL-MCNC: 5 MG/DL (ref 7–30)
CALCIUM SERPL-MCNC: 9.5 MG/DL (ref 8.5–10.1)
CHLORIDE BLD-SCNC: 100 MMOL/L (ref 94–109)
CHOLEST SERPL-MCNC: 162 MG/DL
CO2 SERPL-SCNC: 29 MMOL/L (ref 20–32)
CREAT SERPL-MCNC: 1.04 MG/DL (ref 0.66–1.25)
EOSINOPHIL # BLD AUTO: 0.1 10E3/UL (ref 0–0.7)
EOSINOPHIL NFR BLD AUTO: 3 %
ERYTHROCYTE [DISTWIDTH] IN BLOOD BY AUTOMATED COUNT: 12.4 % (ref 10–15)
FASTING STATUS PATIENT QL REPORTED: YES
GFR SERPL CREATININE-BSD FRML MDRD: 83 ML/MIN/1.73M2
GLUCOSE BLD-MCNC: 93 MG/DL (ref 70–99)
HCT VFR BLD AUTO: 41.6 % (ref 40–53)
HDLC SERPL-MCNC: 42 MG/DL
HGB BLD-MCNC: 13.6 G/DL (ref 13.3–17.7)
LDLC SERPL CALC-MCNC: 100 MG/DL
LYMPHOCYTES # BLD AUTO: 2 10E3/UL (ref 0.8–5.3)
LYMPHOCYTES NFR BLD AUTO: 44 %
MCH RBC QN AUTO: 31.1 PG (ref 26.5–33)
MCHC RBC AUTO-ENTMCNC: 32.7 G/DL (ref 31.5–36.5)
MCV RBC AUTO: 95 FL (ref 78–100)
MONOCYTES # BLD AUTO: 0.5 10E3/UL (ref 0–1.3)
MONOCYTES NFR BLD AUTO: 10 %
NEUTROPHILS # BLD AUTO: 1.9 10E3/UL (ref 1.6–8.3)
NEUTROPHILS NFR BLD AUTO: 42 %
NONHDLC SERPL-MCNC: 120 MG/DL
PLATELET # BLD AUTO: 282 10E3/UL (ref 150–450)
POTASSIUM BLD-SCNC: 4.4 MMOL/L (ref 3.4–5.3)
PROT SERPL-MCNC: 8.3 G/DL (ref 6.8–8.8)
RBC # BLD AUTO: 4.37 10E6/UL (ref 4.4–5.9)
SODIUM SERPL-SCNC: 133 MMOL/L (ref 133–144)
TRIGL SERPL-MCNC: 98 MG/DL
TSH SERPL DL<=0.005 MIU/L-ACNC: 1.39 MU/L (ref 0.4–4)
WBC # BLD AUTO: 4.5 10E3/UL (ref 4–11)

## 2021-07-15 PROCEDURE — 80061 LIPID PANEL: CPT | Performed by: PHYSICIAN ASSISTANT

## 2021-07-15 PROCEDURE — 99213 OFFICE O/P EST LOW 20 MIN: CPT | Performed by: PHYSICIAN ASSISTANT

## 2021-07-15 PROCEDURE — 80050 GENERAL HEALTH PANEL: CPT | Performed by: PHYSICIAN ASSISTANT

## 2021-07-15 PROCEDURE — 86618 LYME DISEASE ANTIBODY: CPT | Performed by: PHYSICIAN ASSISTANT

## 2021-07-15 PROCEDURE — 36415 COLL VENOUS BLD VENIPUNCTURE: CPT | Performed by: PHYSICIAN ASSISTANT

## 2021-07-15 ASSESSMENT — MIFFLIN-ST. JEOR: SCORE: 1814.27

## 2021-07-15 NOTE — PROGRESS NOTES
Assessment & Plan       ICD-10-CM    1. Fatigue, unspecified type  R53.83 CBC with platelets and differential     Comprehensive metabolic panel (BMP + Alb, Alk Phos, ALT, AST, Total. Bili, TP)     Lyme Disease Glendy with reflex to WB Serum     TSH with free T4 reflex     TSH with free T4 reflex     Lyme Disease Glendy with reflex to WB Serum     Comprehensive metabolic panel (BMP + Alb, Alk Phos, ALT, AST, Total. Bili, TP)     CBC with platelets and differential   2. Nausea  R11.0    3. Diarrhea, unspecified type  R19.7    4. High cholesterol  E78.00 Lipid panel reflex to direct LDL Fasting     Lipid panel reflex to direct LDL Fasting   Talk to patient on his concerns at this point I suspect this is a viral etiology as a starting to improve.  Warning signs side effects were discussed.  Labs are pending.  Follow-up with depend on lab results.    Return in about 1 week (around 7/22/2021) for As Needed.    KIMBERLY Hunter [facility]    Fiona Young is a 50 year old who presents for the following health issues   HPI     Concern - Ill  Onset: going on 2 weeks   Description: nausea, decreased appetite, dry mouth, headache, fatigue, light headed, feels dazed , diarrhea x 2.   Intensity: moderate  Progression of Symptoms:  Was getting worse bu now seem to be getting better   Accompanying Signs & Symptoms: has acoustic neuroma and reached out to his ENT Dr and they don't believe that it is related.   Previous history of similar problem: none  Precipitating factors:        Worsened by: worse in the morning   Alleviating factors:        Improved by: unsure   Therapies tried and outcome: None  Dietary changes since his last lab test: stopped red meats and processed foods. No salty foods.  He would like to get his cholesterol rechecked today.    Just got shingles shot.     Symptoms were worse on Saturday and slowly getting better. Feels better in the evening.     No recent illnesses or fevers.      Daughter and sister in law were sick with similar symptoms when they were up at the cabin last week also.     Review of Systems   Constitutional, HEENT, cardiovascular, pulmonary, gi and gu systems are negative, except as otherwise noted.      Objective    /82   Pulse 71   Temp 97.2  F (36.2  C) (Tympanic)   Resp 16   Ht 1.829 m (6')   Wt 91.6 kg (202 lb)   SpO2 99%   BMI 27.40 kg/m    Body mass index is 27.4 kg/m .  Physical Exam   GENERAL: healthy, alert and no distress  EYES: Eyes grossly normal to inspection, PERRL and conjunctivae and sclerae normal  HENT: ear canals and TM's normal, nose and mouth without ulcers or lesions  NECK: no adenopathy, no asymmetry, masses, or scars and thyroid normal to palpation  RESP: lungs clear to auscultation - no rales, rhonchi or wheezes  CV: regular rate and rhythm, normal S1 S2, no S3 or S4, no murmur, click or rub, no peripheral edema and peripheral pulses strong  ABDOMEN: soft, nontender, no hepatosplenomegaly, no masses and bowel sounds normal  MS: no gross musculoskeletal defects noted, no edema  SKIN: no suspicious lesions or rashes  NEURO: Normal strength and tone, mentation intact and speech normal  PSYCH: mentation appears normal, affect normal/bright    Labs pending

## 2021-07-15 NOTE — LETTER
July 19, 2021      Fortino Polo  [address] [address]        Dear ,    We are writing to inform you of your test results.  All of your labs were normal/near normal for you.     If you have any questions or concerns, please call the clinic at the number listed above.       Sincerely,      Zbigniew Garcia PA-C/[facility]          Resulted Orders   TSH with free T4 reflex   Result Value Ref Range    TSH 1.39 0.40 - 4.00 mU/L   Lyme Disease Glendy with reflex to WB Serum   Result Value Ref Range    Lyme Disease Antibodies Total 0.75 <0.90      Comment:      Negative, Absence of detectable Borrelia burdorferi antibodies. A negative result does not exclude the possibility of Borrelia burgdorferi infection. If early Lyme disease is suspected, a second sample should be collected and tested 2 to 4 weeks later.   Lipid panel reflex to direct LDL Fasting   Result Value Ref Range    Cholesterol 162 <200 mg/dL      Comment:      Age 0-19 years  Desirable: <170 mg/dL  Borderline high:  170-199 mg/dl  High:            >199 mg/dl    Age 20 years and older  Desirable: <200 mg/dL    Triglycerides 98 <150 mg/dL      Comment:      0-9 years:  Normal:    Less than 75 mg/dL  Borderline high:  75-99 mg/dL  High:             Greater than or equal to 100 mg/dL    0-19 years:  Normal:    Less than 90 mg/dL  Borderline high:   mg/dL  High:             Greater than or equal to 130 mg/dL    20 years and older:  Normal:    Less than 150 mg/dL  Borderline high:  150-199 mg/dL  High:             200-499 mg/dL  Very high:   Greater than or equal to 500 mg/dL    Direct Measure HDL 42 >=40 mg/dL      Comment:      0-19 years:       Greater than or equal to 45 mg/dL   Low: Less than 40 mg/dL   Borderline low: 40-44 mg/dL     20 years and older:   Female: Greater than or equal to 50 mg/dL   Male:   Greater than or equal to 40 mg/dL         LDL Cholesterol Calculated 100 <=100 mg/dL      Comment:      Age 0-19  years:  Desirable: 0-110 mg/dL   Borderline high: 110-129 mg/dL   High: >= 130 mg/dL    Age 20 years and older:  Desirable: <100mg/dL  Above desirable: 100-129 mg/dL   Borderline high: 130-159 mg/dL   High: 160-189 mg/dL   Very high: >= 190 mg/dL    Non HDL Cholesterol 120 <130 mg/dL      Comment:      0-19 years:  Desirable:          Less than 120 mg/dL  Borderline high:   120-144 mg/dL  High:                   Greater than or equal to 145 mg/dL    20 years and older:  Desirable:          130 mg/dL  Above Desirable: 130-159 mg/dL  Borderline high:   160-189 mg/dL  High:               190-219 mg/dL  Very high:     Greater than or equal to 220 mg/dL    Patient Fasting > 8hrs? Yes    Comprehensive metabolic panel (BMP + Alb, Alk Phos, ALT, AST, Total. Bili, TP)   Result Value Ref Range    Sodium 133 133 - 144 mmol/L    Potassium 4.4 3.4 - 5.3 mmol/L    Chloride 100 94 - 109 mmol/L    Carbon Dioxide (CO2) 29 20 - 32 mmol/L    Anion Gap 4 3 - 14 mmol/L    Urea Nitrogen 5 (L) 7 - 30 mg/dL    Creatinine 1.04 0.66 - 1.25 mg/dL    Calcium 9.5 8.5 - 10.1 mg/dL    Glucose 93 70 - 99 mg/dL    Alkaline Phosphatase 49 40 - 150 U/L    AST 22 0 - 45 U/L    ALT 40 0 - 70 U/L    Protein Total 8.3 6.8 - 8.8 g/dL    Albumin 4.4 3.4 - 5.0 g/dL    Bilirubin Total 0.7 0.2 - 1.3 mg/dL    GFR Estimate 83 >60 mL/min/1.73m2      Comment:      As of July 11, 2021, eGFR is calculated by the CKD-EPI creatinine equation, without race adjustment. eGFR can be influenced by muscle mass, exercise, and diet. The reported eGFR is an estimation only and is only applicable if the renal function is stable.   CBC with platelets and differential   Result Value Ref Range    WBC Count 4.5 4.0 - 11.0 10e3/uL    RBC Count 4.37 (L) 4.40 - 5.90 10e6/uL    Hemoglobin 13.6 13.3 - 17.7 g/dL    Hematocrit 41.6 40.0 - 53.0 %    MCV 95 78 - 100 fL    MCH 31.1 26.5 - 33.0 pg    MCHC 32.7 31.5 - 36.5 g/dL    RDW 12.4 10.0 - 15.0 %    Platelet Count 282 150 - 450 10e3/uL     % Neutrophils 42 %    % Lymphocytes 44 %    % Monocytes 10 %    % Eosinophils 3 %    % Basophils 0 %    Absolute Neutrophils 1.9 1.6 - 8.3 10e3/uL    Absolute Lymphocytes 2.0 0.8 - 5.3 10e3/uL    Absolute Monocytes 0.5 0.0 - 1.3 10e3/uL    Absolute Eosinophils 0.1 0.0 - 0.7 10e3/uL    Absolute Basophils 0.0 0.0 - 0.2 10e3/uL

## 2021-07-16 LAB — B BURGDOR IGG+IGM SER QL: 0.75

## 2021-07-17 NOTE — RESULT ENCOUNTER NOTE
Mr. Polo,    All of your labs were normal/near normal for you.    Please contact the clinic if you have additional questions.  Thank you.    Sincerely,    Zbigniew Garcia PA-C

## 2021-07-21 ENCOUNTER — MYC MEDICAL ADVICE (OUTPATIENT)
Dept: FAMILY MEDICINE | Facility: CLINIC | Age: 51
End: 2021-07-21

## 2021-07-21 DIAGNOSIS — E78.00 HIGH CHOLESTEROL: Primary | ICD-10-CM

## 2021-09-14 ENCOUNTER — OFFICE VISIT (OUTPATIENT)
Dept: OTOLARYNGOLOGY | Facility: CLINIC | Age: 51
End: 2021-09-14
Payer: COMMERCIAL

## 2021-09-14 ENCOUNTER — ANCILLARY PROCEDURE (OUTPATIENT)
Dept: MRI IMAGING | Facility: CLINIC | Age: 51
End: 2021-09-14
Attending: OTOLARYNGOLOGY
Payer: COMMERCIAL

## 2021-09-14 ENCOUNTER — OFFICE VISIT (OUTPATIENT)
Dept: AUDIOLOGY | Facility: CLINIC | Age: 51
End: 2021-09-14
Payer: COMMERCIAL

## 2021-09-14 VITALS
DIASTOLIC BLOOD PRESSURE: 95 MMHG | SYSTOLIC BLOOD PRESSURE: 154 MMHG | HEIGHT: 72 IN | WEIGHT: 195.55 LBS | OXYGEN SATURATION: 100 % | TEMPERATURE: 97.8 F | BODY MASS INDEX: 26.49 KG/M2 | HEART RATE: 67 BPM

## 2021-09-14 VITALS
SYSTOLIC BLOOD PRESSURE: 154 MMHG | DIASTOLIC BLOOD PRESSURE: 95 MMHG | OXYGEN SATURATION: 100 % | WEIGHT: 195 LBS | BODY MASS INDEX: 26.41 KG/M2 | HEART RATE: 98 BPM | HEIGHT: 72 IN

## 2021-09-14 DIAGNOSIS — D33.3 VESTIBULAR SCHWANNOMA (H): ICD-10-CM

## 2021-09-14 DIAGNOSIS — H90.41 SENSORINEURAL HEARING LOSS (SNHL) OF RIGHT EAR WITH UNRESTRICTED HEARING OF LEFT EAR: Primary | ICD-10-CM

## 2021-09-14 DIAGNOSIS — D33.3 VESTIBULAR SCHWANNOMA (H): Primary | ICD-10-CM

## 2021-09-14 DIAGNOSIS — D33.3 ACOUSTIC NEUROMA (H): Primary | ICD-10-CM

## 2021-09-14 DIAGNOSIS — D33.3 ACOUSTIC NEUROMA (H): ICD-10-CM

## 2021-09-14 PROCEDURE — 99214 OFFICE O/P EST MOD 30 MIN: CPT | Performed by: NEUROLOGICAL SURGERY

## 2021-09-14 PROCEDURE — 92550 TYMPANOMETRY & REFLEX THRESH: CPT | Performed by: AUDIOLOGIST

## 2021-09-14 PROCEDURE — 99214 OFFICE O/P EST MOD 30 MIN: CPT | Mod: GC | Performed by: OTOLARYNGOLOGY

## 2021-09-14 PROCEDURE — 92557 COMPREHENSIVE HEARING TEST: CPT | Performed by: AUDIOLOGIST

## 2021-09-14 PROCEDURE — A9585 GADOBUTROL INJECTION: HCPCS | Performed by: RADIOLOGY

## 2021-09-14 PROCEDURE — 70553 MRI BRAIN STEM W/O & W/DYE: CPT | Mod: GC | Performed by: RADIOLOGY

## 2021-09-14 PROCEDURE — 92565 STENGER TEST PURE TONE: CPT | Performed by: AUDIOLOGIST

## 2021-09-14 RX ORDER — GADOBUTROL 604.72 MG/ML
10 INJECTION INTRAVENOUS ONCE
Status: COMPLETED | OUTPATIENT
Start: 2021-09-14 | End: 2021-09-14

## 2021-09-14 RX ADMIN — GADOBUTROL 9 ML: 604.72 INJECTION INTRAVENOUS at 15:39

## 2021-09-14 ASSESSMENT — MIFFLIN-ST. JEOR
SCORE: 1785
SCORE: 1782.51

## 2021-09-14 ASSESSMENT — PAIN SCALES - GENERAL: PAINLEVEL: NO PAIN (0)

## 2021-09-14 NOTE — DISCHARGE INSTRUCTIONS
MRI Contrast Discharge Instructions    The IV contrast you received today will pass out of your body in your  urine. This will happen in the next 24 hours. You will not feel this process.  Your urine will not change color.    Drink at least 4 extra glasses of water or juice today (unless your doctor  has restricted your fluids). This reduces the stress on your kidneys.  You may take your regular medicines.    If you are on dialysis: It is best to have dialysis today.    If you have a reaction: Most reactions happen right away. If you have  any new symptoms after leaving the hospital (such as hives or swelling),  call your hospital at the correct number below. Or call your family doctor.  If you have breathing distress or wheezing, call 911.    Special instructions: ***    I have read and understand the above information.    Signature:______________________________________ Date:___________    Staff:__________________________________________ Date:___________     Time:__________    [place] [facility] Departments:    ___[place]: [phone]  ___[facility]: [phone]  ___[place]: [phone] ___[facility]: [phone]  ___[facility]: [phone]  ___[facility]: [phone]  ___[phone]  ___[facility]: [phone]  ___[phone]

## 2021-09-14 NOTE — NURSING NOTE
Chief Complaint   Patient presents with     RECHECK     follow up with audio      Blood pressure (!) 154/95, pulse 67, temperature 97.8  F (36.6  C), height 1.829 m (6'), weight 88.7 kg (195 lb 8.8 oz), SpO2 100 %.    Real Juares LPN

## 2021-09-14 NOTE — PROGRESS NOTES
AUDIOLOGY REPORT    SUMMARY: Audiology visit completed. See audiogram for results.      RECOMMENDATIONS: Follow-up with ENT.      Yuri Mock.  Licensed Audiologist  [place] #[id number]

## 2021-09-14 NOTE — LETTER
2021       RE: Fortino Polo  [address] [address]     Dear Colleague,    Thank you for referring your patient, Fortino Polo, to the [facility] [place] [facility]. Please see a copy of my visit note below.      [facility]      Name: Fortino Polo  MRN: [id number]  Age: 50 year old  : 2021      Chief Complaint:   Follow up     History of Present Illness:   Fortino Polo is a 50 year old male who was seen in the [facility] for follow up of vestibular schwannoma.  He is seen in conjunction with my colleague in neurosurgery, Dr. Zbigniew Hunter. In the beginning of this year (2021), the patient reported right-sided tinnitus and occasional fullness in his right ear. The audiogram taken at this time showed high frequency asymmetric sensorineural hearing loss, and he had not been aware of this prior to this year. The MRI taken at this time as well displayed a vestibular schwannoma. The patient has followed with Dr. Rhoades, whom prescribed her Elavil. At his last visit with me about 6 months ago (2021), he reported the tinnitus and hearing loss have been debilitating for him. Two months ago (2021), the patient reached out to us via [employer]hart with symptoms of fatigue, lightheadedness, nausea, loss of appetite, dry mouth, and difficulties balancing. He reported cutting out processed food, red meats, and a majority of his salt intake. Dr. Hunter and I felt comfortable letting him recover comfortably at home and waiting until his appointment today to discuss these symptoms.    Today, he reports that he is less bothered by his tinnitus. Denies any significant changes in hearing or vertigo. He spent his summer in [place] and had several episodes of imbalance when he was paddle boarding. Currently doing well  without any activity restrictions. Denies facial numbness or weakness.     Review of Systems:   Pertinent items are noted in HPI or as in patient entered ROS below, remainder of complete ROS is negative.    ENT ROS 9/13/2021   Constitutional -   Psychology -   Ears, Nose, Throat Hearing loss, Ear pain, Ringing/noise in ears       Physical Exam:   BP (!) 154/95   Pulse 67   Temp 97.8  F (36.6  C)   Ht 1.829 m (6')   Wt 88.7 kg (195 lb 8.8 oz)   SpO2 100%   BMI 26.52 kg/m       Constitutional:  The patient was unaccompanied, well-groomed, and in no acute distress.     Skin: Normal:  warm and pink without rash   Neurologic: Alert and oriented x 3.  CN's III-XII within normal limits.  Voice normal.    Psychiatric: The patient's affect was calm, cooperative, and appropriate.     Communication:  Normal; communicates verbally, normal voice quality.   Respiratory: Breathing comfortably without stridor or exertion of accessory muscles.    Eyes: Pupils were equal and reactive.  Extraocular movement intact.     Ears: Pinnae and tragus non-tender.  EAC's and TM's were clear.          Audiogram:  AUDIOGRAM: He underwent an audiogram today.   This demonstrated: left ear hearing within normal limits with mild hearing loss at 8kHz. Right side with severe mid-high frequency hearing loss. His right hearing is stable in comparison to his audiogram from 3/5/21. WRS in right improved from 88% to 92%.         Right: Speech reception threshold is 10 dB with 92% word recognition. At 55 dB Tympanogram A type   Left: Speech reception threshold is 0 dB with 100% word recognition at 40 dB. Tympanogram A type     Audiogram was independently reviewed.    Imaging:  MRI images were independently reviewed. This demonstrated stable size right vestibular schwannoma involving internal auditory canal and cerebello pontine angle. Measuring 10mm into the cistern, and 7mm into the cistern, and 10mm into the IAC. There is miinimal compression of  the cerebellar peduncle. There is no hydrocephalus. Minimal increase in size of tumor within 1mm in cerebellopontine angle possibly contributed by MRI technical variations.     MR Brain W/O & W Contrast:  (09/14/2021)  IMPRESSION:   1. 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  2. Moderate chronic small vessel ischemic disease.     Assessment and Plan:  Fortino Polo is a 50 year old male who was seen in the [facility] for follow up of right vestibular schwannoma. 6 month follow-up MRI obtained today from his initial MRI showed stable size tumor. His audiogram showed stable hearing in right side (class A). His intermittent episodes of imbalance is not restrictive and we encouraged him to continue physical activity. We again discussed treatment options and all risks and benefits of  each, including observation, radiosurgery, and surgical resection. It is a reasonable option to obtain another scan in one year and continue watchful waiting.  We would get a scan before one year if he developed worsening neurologic symptoms which we reviewed in detail.     We reviewed the goal of radiosurgery being to stop tumor growth rather than to remove the lesion. We reviewed what this outpatient procedure entails. Success rates are in the 80% range or higher for a tumor of this size, and those with growth do not necessarily go on to have surgery if the tumor remains relatively small, but surgery can be necessary for abundant growth. The risks and side effects of radiosurgery are typically delayed in onset but include facial paralysis, dizziness, hearing loss, and risk to surrounding structures. There is also a small risk of malignant degeneration.     The patient asked insightful questions and indicated that he would like to pursue observation at this time and will return to clinic in 1 year with new audio and MRI. The patient  expressed understanding and is in agreement with this plan.  All questions were answered.    Follow-up: in 1 year with follow-up audiogram and MRI    Sameera Carlson MD MPH   Fellow Physician  Otology & Neurotology  [facility]       Scribe Preparation Attestation:  ISuzette, a scribe, prepared the chart for today's encounter.        IShirley MD, saw this patient with the resident/fellow and agree with the resident's findings and plan of care as documented in the resident's/fellow's note.    Shirley Zuniga MD  Otology & Neurotology  [facility]

## 2021-09-14 NOTE — PROGRESS NOTES
[facility]      Name: Fortino Polo  MRN: [id number]  Age: 50 year old  : 2021      Chief Complaint:   Follow up     History of Present Illness:   Fortino Polo is a 50 year old male who was seen in the [facility] for follow up of vestibular schwannoma.  He is seen in conjunction with my colleague in neurosurgery, Dr. Zbigniew Hunter. In the beginning of this year (2021), the patient reported right-sided tinnitus and occasional fullness in his right ear. The audiogram taken at this time showed high frequency asymmetric sensorineural hearing loss, and he had not been aware of this prior to this year. The MRI taken at this time as well displayed a vestibular schwannoma. The patient has followed with Dr. Rhoades, whom prescribed her Elavil. At his last visit with me about 6 months ago (2021), he reported the tinnitus and hearing loss have been debilitating for him. Two months ago (2021), the patient reached out to us via [employer] with symptoms of fatigue, lightheadedness, nausea, loss of appetite, dry mouth, and difficulties balancing. He reported cutting out processed food, red meats, and a majority of his salt intake. Dr. Hunter and I felt comfortable letting him recover comfortably at home and waiting until his appointment today to discuss these symptoms.    Today, he reports that he is less bothered by his tinnitus. Denies any significant changes in hearing or vertigo. He spent his summer in [place] and had several episodes of imbalance when he was paddle boarding. Currently doing well without any activity restrictions. Denies facial numbness or weakness.     Review of Systems:   Pertinent items are noted in HPI or as in patient entered ROS below, remainder of complete ROS is negative.    ENT ROS 2021   Constitutional -   Psychology -   Ears, Nose, Throat Hearing loss, Ear pain, Ringing/noise in ears        Physical Exam:   BP (!) 154/95   Pulse 67   Temp 97.8  F (36.6  C)   Ht 1.829 m (6')   Wt 88.7 kg (195 lb 8.8 oz)   SpO2 100%   BMI 26.52 kg/m       Constitutional:  The patient was unaccompanied, well-groomed, and in no acute distress.     Skin: Normal:  warm and pink without rash   Neurologic: Alert and oriented x 3.  CN's III-XII within normal limits.  Voice normal.    Psychiatric: The patient's affect was calm, cooperative, and appropriate.     Communication:  Normal; communicates verbally, normal voice quality.   Respiratory: Breathing comfortably without stridor or exertion of accessory muscles.    Eyes: Pupils were equal and reactive.  Extraocular movement intact.     Ears: Pinnae and tragus non-tender.  EAC's and TM's were clear.          Audiogram:  AUDIOGRAM: He underwent an audiogram today.   This demonstrated: left ear hearing within normal limits with mild hearing loss at 8kHz. Right side with severe mid-high frequency hearing loss. His right hearing is stable in comparison to his audiogram from 3/5/21. WRS in right improved from 88% to 92%.         Right: Speech reception threshold is 10 dB with 92% word recognition. At 55 dB Tympanogram A type   Left: Speech reception threshold is 0 dB with 100% word recognition at 40 dB. Tympanogram A type     Audiogram was independently reviewed.    Imaging:  MRI images were independently reviewed. This demonstrated stable size right vestibular schwannoma involving internal auditory canal and cerebello pontine angle. Measuring 10mm into the cistern, and 7mm into the cistern, and 10mm into the IAC. There is miinimal compression of the cerebellar peduncle. There is no hydrocephalus. Minimal increase in size of tumor within 1mm in cerebellopontine angle possibly contributed by MRI technical variations.     MR Brain W/O & W Contrast:  (09/14/2021)  IMPRESSION:   1. No significant change in right cerebellopontine angle vestibular  schwannoma with  intracanalicular involvement.  2. Moderate chronic small vessel ischemic disease.     Assessment and Plan:  Fortino Polo is a 50 year old male who was seen in the [facility] for follow up of right vestibular schwannoma. 6 month follow-up MRI obtained today from his initial MRI showed stable size tumor. His audiogram showed stable hearing in right side (class A). His intermittent episodes of imbalance is not restrictive and we encouraged him to continue physical activity. We again discussed treatment options and all risks and benefits of  each, including observation, radiosurgery, and surgical resection. It is a reasonable option to obtain another scan in one year and continue watchful waiting.  We would get a scan before one year if he developed worsening neurologic symptoms which we reviewed in detail.     We reviewed the goal of radiosurgery being to stop tumor growth rather than to remove the lesion. We reviewed what this outpatient procedure entails. Success rates are in the 80% range or higher for a tumor of this size, and those with growth do not necessarily go on to have surgery if the tumor remains relatively small, but surgery can be necessary for abundant growth. The risks and side effects of radiosurgery are typically delayed in onset but include facial paralysis, dizziness, hearing loss, and risk to surrounding structures. There is also a small risk of malignant degeneration.     The patient asked insightful questions and indicated that he would like to pursue observation at this time and will return to clinic in 1 year with new audio and MRI. The patient expressed understanding and is in agreement with this plan.  All questions were answered.    Follow-up: in 1 year with follow-up audiogram and MRI    Sameera Carlson MD MPH   Fellow Physician  Otology & Neurotology  [facility]       Scribe Preparation Attestation:  Suzette CHEUNG, rehan gerardo, prepared the  chart for today's encounter.        I, Shirley Zuniga MD, saw this patient with the resident/fellow and agree with the resident's findings and plan of care as documented in the resident's/fellow's note.    Shirley Zuniga MD  Otology & Neurotology  [facility]

## 2021-09-14 NOTE — NURSING NOTE
Chief Complaint   Patient presents with     RECHECK     follow up with audio    .    Blood pressure (!) 154/95, pulse 98, height 1.829 m (6'), weight 88.5 kg (195 lb), SpO2 100 %.    Real Juares LPN

## 2021-09-14 NOTE — Clinical Note
2021       RE: Fortino Polo  [address] [address] [facility] [address] [facility] [address]     Dear Colleague,    Thank you for referring your patient, Fortino Polo, to the [facility] [place] at [facility]. Please see a copy of my visit note below.      [facility]      Name: Fortino Polo  : 1970  Referring provider: Referred Self  2021      Reason for visit: Right vestibular schwannoma, follow up visit    Dear Dr. Rhoades,     It was a pleasure to see Mr. Polo back in the [facility] today in follow up. He is seen in conjunction with my colleague in neurotology, Dr. Shirley Zuniga. As you recall, Mr. Polo is a 50 year old male who presented with right-sided tinnitus, right otalgia, and aural fullness. The audiogram you ordered demonstrated asymmetric hearing loss, so an MRI was performed that showed right CPA mass. At his last visit with me (2021), he reported that the right-sided tinnitus and aural fullness have been debilitating and have significantly affected his mood and quality of life. The patient reached out two months ago (2021) with symptoms of fatigue, lightheadedness, nausea, loss of appetite, dry mouth, and difficulties balancing. He reported cutting out processed food, red meats, and a majority of his salt intake. Dr. Zuniga and I felt comfortable letting him recover at home and waiting until his appointment today to follow up with him. Here today, the patient reported that he noticed difficulty balancing for the first time while paddle boarding on vacation in [place] 4 months ago (2021). He now notices mild balance issues in the morning and in the evenings. The patient endorses a wearing a right-sided hearing aid, for which he reports has helped him.       Review of Systems:   Pertinent items are noted in HPI, remainder of complete  ROS is negative.      Physical Exam:   BP (!) 154/95   Pulse 98   Ht 1.829 m (6')   Wt 88.5 kg (195 lb)   SpO2 100%   BMI 26.45 kg/m       General: No acute distress.   Head: No signs of trauma.    Eyes: Conjunctivae are normal. Pupils are equal. EOMI  Mouth/Throat: Oropharynx moist.  Neck: Normal range of motion.    Resp: No respiratory distress.   MSK: Moves all extremities.  No obvious deformity.  Neuro: The patient is alert and interactive. Speech normal. Facial nerve function is normal.  Facial sensation is normal.  Psych: Normal mood and affect. Behavior is normal.      Audiogram:  AUDIOGRAM: He underwent an audiogram today.     Per audiology, this demonstrated:  Otoscopy- non occluding cerumen right w/ eardrum visualized. Left- thresholds within normal limits, no air-bone gaps present; 10 dB improvement 2k. Right- normal sloping to moderately severe/severe SNHL; 5-15 dB improvement all frequencies. 100% word rec.left (stable), right (stable). Normal tymps, present (normal) ipsi/contra reflexes 1k bilaterally.        Right: 92% word recognition at 55 dB. PTA 27 dB HL  Left: 100% word recognition at 40 dB.  PTA 1 dB HL    Audiogram was independently reviewed.    Imaging:  MR Brain W/O & W Contrast:  (09/14/2021)  IMPRESSION:   1. 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  2. Moderate chronic small vessel ischemic disease.    MRI images were independently reviewed.    Assessment:  1.  Stable right vestibular schwannoma  2.  AAO-HNS hearing class A bilaterally    Plan:   1.       We reviewed the MRI and we are pleased with the stable results. We will plan to see him back in 1 year with an updated MRI and audiogram.   2. I encouraged him to contact us should any questions or concerns arise in advance of his next appointment    It has been a pleasure to participate in the care of your patient. Please feel free to contact us if we may be of  any assistance for Mr. Polo.      Scribe Disclosure:  I, Suzette Iam, am serving as a scribe to document services personally performed by Zbigniew Hunter MD at this visit, based upon the provider's statements to me. All documentation has been reviewed by the aforementioned provider prior to being entered into the official medical record.            Again, thank you for allowing me to participate in the care of your patient.      Sincerely,    Zbigniew Hunter MD

## 2021-09-14 NOTE — PROGRESS NOTES
[facility]      Name: Fortino Polo  : 1970  Referring provider: Referred Self  2021      Reason for visit: Right vestibular schwannoma, follow up visit    Dear Dr. Rhoades,     It was a pleasure to see Mr. Polo back in the [facility] today in follow up. He is seen in conjunction with my colleague in neurotology, Dr. Shirley Zuniga. As you recall, Mr. Polo is a 50 year old male who presented with right-sided tinnitus, right otalgia, and aural fullness. The audiogram you ordered demonstrated asymmetric hearing loss, so an MRI was performed that showed right CPA mass. At his last visit with me (2021), he reported that the right-sided tinnitus and aural fullness have been debilitating and have significantly affected his mood and quality of life. The patient reached out two months ago (2021) with symptoms of fatigue, lightheadedness, nausea, loss of appetite, dry mouth, and difficulties balancing. He reported cutting out processed food, red meats, and a majority of his salt intake. Dr. Zuniga and I felt comfortable letting him recover at home and waiting until his appointment today to follow up with him. Here today, the patient reported that he noticed difficulty balancing for the first time while paddle boarding on vacation in [place] 4 months ago (2021). He now notices mild balance issues in the morning and in the evenings. The patient endorses a wearing a right-sided hearing aid, for which he reports has helped him.       Review of Systems:   Pertinent items are noted in HPI, remainder of complete ROS is negative.      Physical Exam:   BP (!) 154/95   Pulse 98   Ht 1.829 m (6')   Wt 88.5 kg (195 lb)   SpO2 100%   BMI 26.45 kg/m       General: No acute distress.   Head: No signs of trauma.    Eyes: Conjunctivae are normal. Pupils are equal. EOMI  Mouth/Throat: Oropharynx moist.  Neck: Normal range of motion.     Resp: No respiratory distress.   MSK: Moves all extremities.  No obvious deformity.  Neuro: The patient is alert and interactive. Speech normal. Facial nerve function is normal.  Facial sensation is normal.  Psych: Normal mood and affect. Behavior is normal.      Audiogram:  AUDIOGRAM: He underwent an audiogram today.     Per audiology, this demonstrated:  Otoscopy- non occluding cerumen right w/ eardrum visualized. Left- thresholds within normal limits, no air-bone gaps present; 10 dB improvement 2k. Right- normal sloping to moderately severe/severe SNHL; 5-15 dB improvement all frequencies. 100% word rec.left (stable), right (stable). Normal tymps, present (normal) ipsi/contra reflexes 1k bilaterally.        Right: 92% word recognition at 55 dB. PTA 27 dB HL  Left: 100% word recognition at 40 dB.  PTA 1 dB HL    Audiogram was independently reviewed.    Imaging:  MR Brain W/O & W Contrast:  (09/14/2021)  IMPRESSION:   1. 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  2. Moderate chronic small vessel ischemic disease.    MRI images were independently reviewed.    Assessment:  1.  Stable right vestibular schwannoma  2.  AAO-HNS hearing class A bilaterally    Plan:   1.       We reviewed the MRI and we are pleased with the stable results. We will plan to see him back in 1 year with an updated MRI and audiogram.   2. I encouraged him to contact us should any questions or concerns arise in advance of his next appointment    It has been a pleasure to participate in the care of your patient. Please feel free to contact us if we may be of any assistance for Mr. Polo.      Scribe Disclosure:  I, Suzette Vitale, am serving as a scribe to document services personally performed by Zbigniew Hunter MD at this visit, based upon the provider's statements to me. All documentation has been reviewed by the aforementioned provider prior to being entered into  the official medical record.

## 2021-09-14 NOTE — LETTER
[place] [facility]  [facility] AND THROAT [address] [address]  Phone: [phone]  Fax: [phone]          2021    RE:   Fortino Polo  [address] [address] [facility] [address]      Dear Colleague,    Thank you for referring your patient, Fortino Polo, to the [facility]. Please see a copy of my visit note below.        [facility]      Name: Fortino Polo  : 1970  Referring provider: Referred Self  2021      Reason for visit: Right vestibular schwannoma, follow up visit    Dear Dr. Rhoades,     It was a pleasure to see Mr. Polo back in the [facility] today in follow up. He is seen in conjunction with my colleague in neurotology, Dr. Shirley Zuniga. As you recall, Mr. Polo is a 50 year old male who presented with right-sided tinnitus, right otalgia, and aural fullness. The audiogram you ordered demonstrated asymmetric hearing loss, so an MRI was performed that showed right CPA mass. At his last visit with me (2021), he reported that the right-sided tinnitus and aural fullness have been debilitating and have significantly affected his mood and quality of life. The patient reached out two months ago (2021) with symptoms of fatigue, lightheadedness, nausea, loss of appetite, dry mouth, and difficulties balancing. He reported cutting out processed food, red meats, and a majority of his salt intake. Dr. Zuniga and I felt comfortable letting him recover at home and waiting until his appointment today to follow up with him. Here today, the patient reported that he noticed difficulty balancing for the first time while paddle boarding on vacation in [place] 4 months ago (2021). He now notices mild balance issues in the morning and in the evenings. The patient endorses a wearing a  right-sided hearing aid, for which he reports has helped him.       Review of Systems:   Pertinent items are noted in HPI, remainder of complete ROS is negative.      Physical Exam:   BP (!) 154/95   Pulse 98   Ht 1.829 m (6')   Wt 88.5 kg (195 lb)   SpO2 100%   BMI 26.45 kg/m       General: No acute distress.   Head: No signs of trauma.    Eyes: Conjunctivae are normal. Pupils are equal. EOMI  Mouth/Throat: Oropharynx moist.  Neck: Normal range of motion.    Resp: No respiratory distress.   MSK: Moves all extremities.  No obvious deformity.  Neuro: The patient is alert and interactive. Speech normal. Facial nerve function is normal.  Facial sensation is normal.  Psych: Normal mood and affect. Behavior is normal.      Audiogram:  AUDIOGRAM: He underwent an audiogram today.     Per audiology, this demonstrated:  Otoscopy- non occluding cerumen right w/ eardrum visualized. Left- thresholds within normal limits, no air-bone gaps present; 10 dB improvement 2k. Right- normal sloping to moderately severe/severe SNHL; 5-15 dB improvement all frequencies. 100% word rec.left (stable), right (stable). Normal tymps, present (normal) ipsi/contra reflexes 1k bilaterally.        Right: 92% word recognition at 55 dB. PTA 27 dB HL  Left: 100% word recognition at 40 dB.  PTA 1 dB HL    Audiogram was independently reviewed.    Imaging:  MR Brain W/O & W Contrast:  (09/14/2021)  IMPRESSION:   1. 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  2. Moderate chronic small vessel ischemic disease.    MRI images were independently reviewed.    Assessment:  1.  Stable right vestibular schwannoma  2.  AAO-HNS hearing class A bilaterally    Plan:   1.       We reviewed the MRI and we are pleased with the stable results. We will plan to see him back in 1 year with an updated MRI and audiogram.   2. I encouraged him to contact us should any questions or concerns arise in  advance of his next appointment    It has been a pleasure to participate in the care of your patient. Please feel free to contact us if we may be of any assistance for Mr. Polo.      Scribe Disclosure:  I, Suzette Vitale, am serving as a scribe to document services personally performed by Zbigniew Hunter MD at this visit, based upon the provider's statements to me. All documentation has been reviewed by the aforementioned provider prior to being entered into the official medical record.              Again, thank you for allowing me to participate in the care of your patient.      Sincerely,    Zbigniew Hunter MD

## 2021-09-23 ENCOUNTER — MYC MEDICAL ADVICE (OUTPATIENT)
Dept: FAMILY MEDICINE | Facility: CLINIC | Age: 51
End: 2021-09-23

## 2021-09-23 ENCOUNTER — LAB (OUTPATIENT)
Dept: LAB | Facility: CLINIC | Age: 51
End: 2021-09-23
Payer: COMMERCIAL

## 2021-09-23 DIAGNOSIS — E78.00 HIGH CHOLESTEROL: ICD-10-CM

## 2021-09-23 LAB
CHOLEST SERPL-MCNC: 242 MG/DL
FASTING STATUS PATIENT QL REPORTED: YES
HDLC SERPL-MCNC: 39 MG/DL
LDLC SERPL CALC-MCNC: 171 MG/DL
NONHDLC SERPL-MCNC: 203 MG/DL
TRIGL SERPL-MCNC: 158 MG/DL

## 2021-09-23 PROCEDURE — 36415 COLL VENOUS BLD VENIPUNCTURE: CPT

## 2021-09-23 PROCEDURE — 80061 LIPID PANEL: CPT

## 2021-09-23 RX ORDER — ATORVASTATIN CALCIUM 20 MG/1
20 TABLET, FILM COATED ORAL DAILY
Qty: 90 TABLET | Refills: 4 | COMMUNITY
Start: 2021-09-23 | End: 2021-11-03

## 2021-09-26 ENCOUNTER — HEALTH MAINTENANCE LETTER (OUTPATIENT)
Age: 51
End: 2021-09-26

## 2021-09-28 ENCOUNTER — MYC MEDICAL ADVICE (OUTPATIENT)
Dept: FAMILY MEDICINE | Facility: CLINIC | Age: 51
End: 2021-09-28

## 2021-10-19 PROBLEM — F32.9 MAJOR DEPRESSION: Status: ACTIVE | Noted: 2021-04-23

## 2021-11-01 DIAGNOSIS — E78.00 HIGH CHOLESTEROL: ICD-10-CM

## 2021-11-03 ENCOUNTER — MYC MEDICAL ADVICE (OUTPATIENT)
Dept: FAMILY MEDICINE | Facility: CLINIC | Age: 51
End: 2021-11-03

## 2021-11-03 RX ORDER — ATORVASTATIN CALCIUM 20 MG/1
TABLET, FILM COATED ORAL
Qty: 90 TABLET | Refills: 1 | Status: SHIPPED | OUTPATIENT
Start: 2021-11-03 | End: 2022-04-28

## 2022-04-25 ASSESSMENT — ENCOUNTER SYMPTOMS
PALPITATIONS: 0
HEMATURIA: 0
CONSTIPATION: 0
FEVER: 0
NERVOUS/ANXIOUS: 0
ARTHRALGIAS: 0
PARESTHESIAS: 0
DYSURIA: 0
MYALGIAS: 0
DIARRHEA: 0
NAUSEA: 0
SHORTNESS OF BREATH: 0
EYE PAIN: 0
HEADACHES: 0
COUGH: 0
CHILLS: 0
ABDOMINAL PAIN: 0
JOINT SWELLING: 0
SORE THROAT: 0
WEAKNESS: 0
HEARTBURN: 0
HEMATOCHEZIA: 0
DIZZINESS: 0
FREQUENCY: 0

## 2022-04-27 ASSESSMENT — ENCOUNTER SYMPTOMS
SORE THROAT: 0
ABDOMINAL PAIN: 0
MYALGIAS: 0
HEMATOCHEZIA: 0
SHORTNESS OF BREATH: 0
CHILLS: 0
HEMATURIA: 0
FREQUENCY: 0
COUGH: 0
PARESTHESIAS: 0
DIARRHEA: 0
CONSTIPATION: 0
WEAKNESS: 0
ARTHRALGIAS: 0
JOINT SWELLING: 0
NERVOUS/ANXIOUS: 0
DYSURIA: 0
HEARTBURN: 0
NAUSEA: 0
EYE PAIN: 0
HEADACHES: 0
DIZZINESS: 0
FEVER: 0
PALPITATIONS: 0

## 2022-04-27 NOTE — PROGRESS NOTES
SUBJECTIVE:   CC: Fortino Polo is an 51 year old male who presents for preventative health visit.       Patient has been advised of split billing requirements and indicates understanding: Yes  Healthy Habits:     Getting at least 3 servings of Calcium per day:  Yes    Bi-annual eye exam:  Yes    Dental care twice a year:  Yes    Sleep apnea or symptoms of sleep apnea:  None    Diet:  Low fat/cholesterol    Frequency of exercise:  6-7 days/week    Duration of exercise:  Greater than 60 minutes    Taking medications regularly:  Yes    Medication side effects:  None    PHQ-2 Total Score: 0    Hyperlipidemia Follow-Up      Are you regularly taking any medication or supplement to lower your cholesterol?   Yes- lipitor    Are you having muscle aches or other side effects that you think could be caused by your cholesterol lowering medication?  No    Has acoustic neuroma on right ear. See's ENT. Stable. Follow up  Yearly. Slight hearing loss.       Today's PHQ-2 Score:   PHQ-2 ( 1999 Pfizer) 4/25/2022   Q1: Little interest or pleasure in doing things 0   Q2: Feeling down, depressed or hopeless 0   PHQ-2 Score 0   PHQ-2 Total Score (12-17 Years)- Positive if 3 or more points; Administer PHQ-A if positive -   Q1: Little interest or pleasure in doing things Not at all   Q2: Feeling down, depressed or hopeless Not at all   PHQ-2 Score 0       Abuse: Current or Past(Physical, Sexual or Emotional)- No  Do you feel safe in your environment? Yes    Have you ever done Advance Care Planning? (For example, a Health Directive, POLST, or a discussion with a medical provider or your loved ones about your wishes): Yes, advance care planning is on file.    Social History     Tobacco Use     Smoking status: Never Smoker     Smokeless tobacco: Former User   Substance Use Topics     Alcohol use: Yes     Comment: weekends beer         Alcohol Use 4/25/2022   Prescreen: >3 drinks/day or >7 drinks/week? Yes   Prescreen: >3 drinks/day or >7  drinks/week? -   AUDIT SCORE  5       Last PSA: No results found for: PSA    Reviewed orders with patient. Reviewed health maintenance and updated orders accordingly - Yes  Lab work is in process  Labs reviewed in EPIC  BP Readings from Last 3 Encounters:   22 119/75   21 (!) 154/95   21 (!) 154/95    Wt Readings from Last 3 Encounters:   22 91.6 kg (202 lb)   21 88.5 kg (195 lb)   21 88.7 kg (195 lb 8.8 oz)                  Patient Active Problem List   Diagnosis     CARDIOVASCULAR SCREENING; LDL GOAL LESS THAN 160     BMI 27.0-27.9,adult     SNHL (sensory-neural hearing loss), asymmetrical     Tinnitus, right     Acoustic neuroma (H)     High cholesterol     Past Surgical History:   Procedure Laterality Date     DRAIN PILONIDAL CYST SIMPL       REMOVAL OF NAIL PLATE         Social History     Tobacco Use     Smoking status: Never Smoker     Smokeless tobacco: Former User   Substance Use Topics     Alcohol use: Yes     Comment: weekends beer     Family History   Problem Relation Age of Onset     Hypertension Mother      Heart Disease Father         PFO     Cerebrovascular Disease Father          81 possible pneumonia     Genitourinary Problems Sister         liver          Current Outpatient Medications   Medication Sig Dispense Refill     atorvastatin (LIPITOR) 20 MG tablet Take 1 tablet (20 mg) by mouth daily 90 tablet 3     Allergies   Allergen Reactions     Nkda [No Known Drug Allergies]      Seasonal Allergies        Reviewed and updated as needed this visit by clinical staff   Tobacco  Allergies  Meds  Problems  Med Hx  Surg Hx  Fam Hx            Reviewed and updated as needed this visit by Provider   Tobacco  Allergies  Meds  Problems  Med Hx  Surg Hx  Fam Hx             Past Medical History:   Diagnosis Date     Condyloma      Dyslipidemia      Pilonidal cyst       Past Surgical History:   Procedure Laterality Date     DRAIN PILONIDAL CYST SIMPL        REMOVAL OF NAIL PLATE         Review of Systems   Constitutional: Negative for chills and fever.   HENT: Positive for ear pain and hearing loss. Negative for congestion and sore throat.    Eyes: Negative for pain and visual disturbance.   Respiratory: Negative for cough and shortness of breath.    Cardiovascular: Negative for chest pain, palpitations and peripheral edema.   Gastrointestinal: Negative for abdominal pain, constipation, diarrhea, heartburn, hematochezia and nausea.   Genitourinary: Negative for dysuria, frequency, genital sores, hematuria, impotence, penile discharge and urgency.   Musculoskeletal: Negative for arthralgias, joint swelling and myalgias.   Skin: Negative for rash.   Neurological: Negative for dizziness, weakness, headaches and paresthesias.   Psychiatric/Behavioral: Negative for mood changes. The patient is not nervous/anxious.        OBJECTIVE:   /75   Pulse 60   Temp (!) 96.5  F (35.8  C) (Tympanic)   Resp 14   Ht 1.829 m (6')   Wt 91.6 kg (202 lb)   SpO2 97%   BMI 27.40 kg/m      Physical Exam  GENERAL: healthy, alert and no distress  EYES: Eyes grossly normal to inspection, PERRL and conjunctivae and sclerae normal  HENT: ear canals and TM's normal, nose and mouth without ulcers or lesions  NECK: no adenopathy, no asymmetry, masses, or scars and thyroid normal to palpation  RESP: lungs clear to auscultation - no rales, rhonchi or wheezes  CV: regular rate and rhythm, normal S1 S2, no S3 or S4, no murmur, click or rub, no peripheral edema and peripheral pulses strong  ABDOMEN: soft, nontender, no hepatosplenomegaly, no masses and bowel sounds normal   (male): normal male genitalia without lesions or urethral discharge, no hernia  MS: no gross musculoskeletal defects noted, no edema  SKIN: no suspicious lesions or rashes  NEURO: Normal strength and tone, mentation intact and speech normal  PSYCH: mentation appears normal, affect normal/bright    Diagnostic Test  Results:  Labs reviewed in Epic    ASSESSMENT/PLAN:       ICD-10-CM    1. Encounter for preventive care  Z00.00 Hepatitis C Screen Reflex to HCV RNA Quant and Genotype     Lipid panel reflex to direct LDL Fasting     Comprehensive metabolic panel (BMP + Alb, Alk Phos, ALT, AST, Total. Bili, TP)     PSA, screen   2. High cholesterol  E78.00 atorvastatin (LIPITOR) 20 MG tablet     OFFICE/OUTPT VISIT,EST,LEVL III   3. Acoustic neuroma (H)  D33.3    4. Screening for malignant neoplasm of the rectum  Z12.12 Adult Gastro Ref - Procedure Only   1. Work on Healthy diet and exercise. Getting heart rate elevated for 30 mins most days of week.  Labs pending  2. medical conditions are stable. meds refilled.  He has been working on diet and exercise and may do a trial of stopping the medicine for couple months rechecking his cholesterol to see if this made any improvements.  We did talk about there possibly being a genetic component to this and would probably recommend he stay on it for lifetime as prevention.  3. Stable con't care with specialist.     COUNSELING:   Reviewed preventive health counseling, as reflected in patient instructions       Regular exercise       Healthy diet/nutrition       Vision screening       Colorectal cancer screening       Prostate cancer screening    Estimated body mass index is 27.4 kg/m  as calculated from the following:    Height as of this encounter: 1.829 m (6').    Weight as of this encounter: 91.6 kg (202 lb).     Weight management plan: Discussed healthy diet and exercise guidelines    He reports that he has never smoked. He has quit using smokeless tobacco.      Counseling Resources:  ATP IV Guidelines  Pooled Cohorts Equation Calculator  FRAX Risk Assessment  ICSI Preventive Guidelines  Dietary Guidelines for Americans, 2010  USDA's MyPlate  ASA Prophylaxis  Lung CA Screening    KIMBERLY Hunter [facility]

## 2022-04-28 ENCOUNTER — OFFICE VISIT (OUTPATIENT)
Dept: FAMILY MEDICINE | Facility: CLINIC | Age: 52
End: 2022-04-28
Payer: COMMERCIAL

## 2022-04-28 VITALS
SYSTOLIC BLOOD PRESSURE: 119 MMHG | OXYGEN SATURATION: 97 % | RESPIRATION RATE: 14 BRPM | BODY MASS INDEX: 27.36 KG/M2 | TEMPERATURE: 96.5 F | HEIGHT: 72 IN | WEIGHT: 202 LBS | HEART RATE: 60 BPM | DIASTOLIC BLOOD PRESSURE: 75 MMHG

## 2022-04-28 DIAGNOSIS — E78.00 HIGH CHOLESTEROL: ICD-10-CM

## 2022-04-28 DIAGNOSIS — Z12.12 SCREENING FOR MALIGNANT NEOPLASM OF THE RECTUM: ICD-10-CM

## 2022-04-28 DIAGNOSIS — D33.3 ACOUSTIC NEUROMA (H): ICD-10-CM

## 2022-04-28 DIAGNOSIS — Z00.00 ENCOUNTER FOR PREVENTIVE CARE: Primary | ICD-10-CM

## 2022-04-28 PROBLEM — F32.9 MAJOR DEPRESSION: Status: RESOLVED | Noted: 2021-04-23 | Resolved: 2022-04-28

## 2022-04-28 PROBLEM — F41.9 ANXIETY: Status: RESOLVED | Noted: 2021-04-23 | Resolved: 2022-04-28

## 2022-04-28 LAB
ALBUMIN SERPL-MCNC: 4 G/DL (ref 3.4–5)
ALP SERPL-CCNC: 43 U/L (ref 40–150)
ALT SERPL W P-5'-P-CCNC: 43 U/L (ref 0–70)
ANION GAP SERPL CALCULATED.3IONS-SCNC: 7 MMOL/L (ref 3–14)
AST SERPL W P-5'-P-CCNC: 23 U/L (ref 0–45)
BILIRUB SERPL-MCNC: 0.4 MG/DL (ref 0.2–1.3)
BUN SERPL-MCNC: 10 MG/DL (ref 7–30)
CALCIUM SERPL-MCNC: 9.7 MG/DL (ref 8.5–10.1)
CHLORIDE BLD-SCNC: 101 MMOL/L (ref 94–109)
CHOLEST SERPL-MCNC: 176 MG/DL
CO2 SERPL-SCNC: 28 MMOL/L (ref 20–32)
CREAT SERPL-MCNC: 1.01 MG/DL (ref 0.66–1.25)
FASTING STATUS PATIENT QL REPORTED: YES
GFR SERPL CREATININE-BSD FRML MDRD: 90 ML/MIN/1.73M2
GLUCOSE BLD-MCNC: 99 MG/DL (ref 70–99)
HDLC SERPL-MCNC: 39 MG/DL
LDLC SERPL CALC-MCNC: 116 MG/DL
NONHDLC SERPL-MCNC: 137 MG/DL
POTASSIUM BLD-SCNC: 3.9 MMOL/L (ref 3.4–5.3)
PROT SERPL-MCNC: 8 G/DL (ref 6.8–8.8)
PSA SERPL-MCNC: 1.37 UG/L (ref 0–4)
SODIUM SERPL-SCNC: 136 MMOL/L (ref 133–144)
TRIGL SERPL-MCNC: 105 MG/DL

## 2022-04-28 PROCEDURE — 36415 COLL VENOUS BLD VENIPUNCTURE: CPT | Performed by: PHYSICIAN ASSISTANT

## 2022-04-28 PROCEDURE — 80053 COMPREHEN METABOLIC PANEL: CPT | Performed by: PHYSICIAN ASSISTANT

## 2022-04-28 PROCEDURE — 80061 LIPID PANEL: CPT | Performed by: PHYSICIAN ASSISTANT

## 2022-04-28 PROCEDURE — 99396 PREV VISIT EST AGE 40-64: CPT | Performed by: PHYSICIAN ASSISTANT

## 2022-04-28 PROCEDURE — 86803 HEPATITIS C AB TEST: CPT | Performed by: PHYSICIAN ASSISTANT

## 2022-04-28 PROCEDURE — 99213 OFFICE O/P EST LOW 20 MIN: CPT | Mod: 25 | Performed by: PHYSICIAN ASSISTANT

## 2022-04-28 PROCEDURE — G0103 PSA SCREENING: HCPCS | Performed by: PHYSICIAN ASSISTANT

## 2022-04-28 RX ORDER — ATORVASTATIN CALCIUM 20 MG/1
20 TABLET, FILM COATED ORAL DAILY
Qty: 90 TABLET | Refills: 3 | Status: SHIPPED | OUTPATIENT
Start: 2022-04-28 | End: 2023-05-24

## 2022-04-28 ASSESSMENT — PAIN SCALES - GENERAL: PAINLEVEL: NO PAIN (0)

## 2022-04-29 LAB — HCV AB SERPL QL IA: NONREACTIVE

## 2022-05-03 ENCOUNTER — TELEPHONE (OUTPATIENT)
Dept: GASTROENTEROLOGY | Facility: CLINIC | Age: 52
End: 2022-05-03
Payer: COMMERCIAL

## 2022-05-03 NOTE — TELEPHONE ENCOUNTER
Screening Questions  BlueKIND OF PREP RedLOCATION [review exclusion criteria] GreenSEDATION TYPE  1. Have you had a positive covid test in the last 90 days? N     2. Do you have a legal guardian or medical Power of ?  Are you able to give consent for your medical care?Y (Sedation review/consideration needed)    3. Are you active on mychart? Y    4. What insurance is in the chart? [facility]     3.   Ordering/Referring Provider: Zbigniew Garcia    4. BMI 27.40 [BMI OVER 40-EXTENDED PREP]  If greater than 40 review exclusion criteria [PAC APPT IF @ [facility]]        5.  Respiratory Screening :  [If yes to any of the following HOSPITAL setting only]     Do you use daily home oxygen? N    Do you have mod to severe Obstructive Sleep Apnea? N  [OKAY @ [facility] SH PH RI]   Do you have Pulmonary Hypertension? N     Do you have UNCONTROLLED asthma? N        6.   Have you had a heart or lung transplant? N      7.   Are you currently on dialysis? N [ If yes, G-PREP & HOSPITAL setting only]     8.   Do you have chronic kidney disease? N [ If yes, G-PREP ]    9.   Have you had a stroke or Transient ischemic attack (TIA - aka  mini stroke ) within 6 months?  N (If yes, please review exclusion criteria)    10.   In the past 6 months, have you had any heart related issues including cardiomyopathy or heart attack? N           If yes, did it require cardiac stenting or other implantable device? N      11.   Do you have any implantable devices in your body (pacemaker, defib, LVAD)? N (If yes, please review exclusion criteria)    12.   Do you take nitroglycerin? N           If yes, how often? N  (if yes, HOSPITAL setting ONLY)    13.   Are you currently taking any blood thinners? N           [IF YES, INFORM PATIENT TO FOLLOW UP W/ ORDERING PROVIDER FOR BRIDGING INSTRUCTIONS]     14.   Do you have a diagnosis of diabetes? N   [ If yes, G-PREP ]    15.   [FEMALES] Are you currently pregnant?     If yes, how many weeks?     16.    Are you taking any prescription pain medications on a routine schedule?  N  [ If yes, EXTENDED PREP.] [If yes, MAC]    17.   Do you have any chemical dependencies such as alcohol, street drugs, or methadone?  N [If yes, MAC]    18.   Do you have any history of post-traumatic stress syndrome, severe anxiety or history of psychosis?  N  [If yes, MAC]    19.   Do you transfer independently?  Y    20.  On a regular basis do you go 3-5 days between bowel movements? N   [ If yes, EXTENDED PREP.]    21.   Preferred LOCAL Pharmacy for Pre Prescription        [id number] [place] [address]      Scheduling Details      Caller : Brendan martinez  (Please ask for phone number if not scheduled by patient)    Type of Procedure Scheduled: colon  Which Colonoscopy Prep was Sent?: mirlax  KHORUTS CF PATIENTS & GROEN'S PATIENTS NEEDS EXTENDED PREP  Surgeon:   Date of Procedure:   Location:       Sedation Type: CS  Conscious Sedation- Needs  for 6 hours after the procedure  MAC/General-Needs  for 24 hours after procedure    Pre-op Required at [facility], [place], [place] and OR for MAC sedation:   (advise patient they will need a pre-op prior to procedure -)      Informed patient they will need an adult    Cannot take any type of public or medical transportation alone    Pre-Procedure Covid test to be completed at [facility] or Externally:     Confirmed Nurse will call to complete assessment     Additional comments: Patient will need to call back after checking with his wife's busy schedule.

## 2022-05-18 ENCOUNTER — TELEPHONE (OUTPATIENT)
Dept: GASTROENTEROLOGY | Facility: CLINIC | Age: 52
End: 2022-05-18
Payer: COMMERCIAL

## 2022-05-18 NOTE — TELEPHONE ENCOUNTER
Screening Questions  BlueKIND OF PREP RedLOCATION [review exclusion criteria] GreenSEDATION TYPE  1. Have you had a positive covid test in the last 90 days? N      2. Do you have a legal guardian or medical Power of ?  Are you able to give consent for your medical care?Y (Sedation review/consideration needed)     3. Are you active on mychart? Y     4. What insurance is in the chart? [facility]      3.   Ordering/Referring Provider: Zbigniew Garcia     4. BMI 27.40 [BMI OVER 40-EXTENDED PREP]  If greater than 40 review exclusion criteria [PAC APPT IF @ [facility]]          5.  Respiratory Screening :  [If yes to any of the following HOSPITAL setting only]                Do you use daily home oxygen? N    Do you have mod to severe Obstructive Sleep Apnea? N  [OKAY @ [facility] SH PH RI]              Do you have Pulmonary Hypertension? N                Do you have UNCONTROLLED asthma? N          6.   Have you had a heart or lung transplant? N       7.   Are you currently on dialysis? N [ If yes, G-PREP & HOSPITAL setting only]      8.   Do you have chronic kidney disease? N [ If yes, G-PREP ]     9.   Have you had a stroke or Transient ischemic attack (TIA - aka  mini stroke ) within 6 months?  N (If yes, please review exclusion criteria)     10.   In the past 6 months, have you had any heart related issues including cardiomyopathy or heart attack? N           If yes, did it require cardiac stenting or other implantable device? N       11.   Do you have any implantable devices in your body (pacemaker, defib, LVAD)? N (If yes, please review exclusion criteria)     12.   Do you take nitroglycerin? N           If yes, how often? N  (if yes, HOSPITAL setting ONLY)     13.   Are you currently taking any blood thinners? N           [IF YES, INFORM PATIENT TO FOLLOW UP W/ ORDERING PROVIDER FOR BRIDGING INSTRUCTIONS]      14.   Do you have a diagnosis of diabetes? N   [ If yes, G-PREP ]     15.   [FEMALES] Are you  currently pregnant?     If yes, how many weeks?      16.   Are you taking any prescription pain medications on a routine schedule?  N  [ If yes, EXTENDED PREP.] [If yes, MAC]     17.   Do you have any chemical dependencies such as alcohol, street drugs, or methadone?  N [If yes, MAC]     18.   Do you have any history of post-traumatic stress syndrome, severe anxiety or history of psychosis?  N  [If yes, MAC]     19.   Do you transfer independently?  Y     20.  On a regular basis do you go 3-5 days between bowel movements? N   [ If yes, EXTENDED PREP.]     21.   Preferred LOCAL Pharmacy for Pre Prescription         [id number] [place] [address]        Scheduling Details        Caller : Brendan martinez  (Please ask for phone number if not scheduled by patient)     Type of Procedure Scheduled: colon  Which Colonoscopy Prep was Sent?: mirlax  [facility] CF PATIENTS & GROEN'S PATIENTS NEEDS EXTENDED PREP  Surgeon: SCOTT  Date of Procedure: 7/21  Location: [facility]        Sedation Type: CS  Conscious Sedation- Needs  for 6 hours after the procedure  MAC/General-Needs  for 24 hours after procedure     Pre-op Required at [facility], [place], [place] and OR for MAC sedation:   (advise patient they will need a pre-op prior to procedure -)      Informed patient they will need an adult    Cannot take any type of public or medical transportation aloneY     Pre-Procedure Covid test to be completed at [facility] or Externally: 7/17     Confirmed Nurse will call to complete assessment Y     Additional comments:

## 2022-07-21 ENCOUNTER — HOSPITAL ENCOUNTER (OUTPATIENT)
Facility: AMBULATORY SURGERY CENTER | Age: 52
Discharge: HOME | End: 2022-07-21
Attending: INTERNAL MEDICINE | Admitting: INTERNAL MEDICINE
Payer: COMMERCIAL

## 2022-07-21 VITALS
SYSTOLIC BLOOD PRESSURE: 122 MMHG | DIASTOLIC BLOOD PRESSURE: 82 MMHG | RESPIRATION RATE: 16 BRPM | OXYGEN SATURATION: 96 % | HEART RATE: 75 BPM

## 2022-07-21 DIAGNOSIS — D12.2 ADENOMATOUS POLYP OF ASCENDING COLON: Primary | ICD-10-CM

## 2022-07-21 LAB — COLONOSCOPY: NORMAL

## 2022-07-21 PROCEDURE — G8907 PT DOC NO EVENTS ON DISCHARG: HCPCS

## 2022-07-21 PROCEDURE — 45385 COLONOSCOPY W/LESION REMOVAL: CPT

## 2022-07-21 PROCEDURE — G8918 PT W/O PREOP ORDER IV AB PRO: HCPCS

## 2022-07-21 RX ORDER — PROCHLORPERAZINE MALEATE 10 MG
10 TABLET ORAL EVERY 6 HOURS PRN
Status: DISCONTINUED | OUTPATIENT
Start: 2022-07-21 | End: 2022-07-22 | Stop reason: HOSPADM

## 2022-07-21 RX ORDER — NALOXONE HYDROCHLORIDE 0.4 MG/ML
0.4 INJECTION, SOLUTION INTRAMUSCULAR; INTRAVENOUS; SUBCUTANEOUS
Status: DISCONTINUED | OUTPATIENT
Start: 2022-07-21 | End: 2022-07-22 | Stop reason: HOSPADM

## 2022-07-21 RX ORDER — ONDANSETRON 2 MG/ML
4 INJECTION INTRAMUSCULAR; INTRAVENOUS
Status: DISCONTINUED | OUTPATIENT
Start: 2022-07-21 | End: 2022-07-22 | Stop reason: HOSPADM

## 2022-07-21 RX ORDER — ONDANSETRON 4 MG/1
4 TABLET, ORALLY DISINTEGRATING ORAL EVERY 6 HOURS PRN
Status: DISCONTINUED | OUTPATIENT
Start: 2022-07-21 | End: 2022-07-22 | Stop reason: HOSPADM

## 2022-07-21 RX ORDER — NALOXONE HYDROCHLORIDE 0.4 MG/ML
0.2 INJECTION, SOLUTION INTRAMUSCULAR; INTRAVENOUS; SUBCUTANEOUS
Status: DISCONTINUED | OUTPATIENT
Start: 2022-07-21 | End: 2022-07-22 | Stop reason: HOSPADM

## 2022-07-21 RX ORDER — FLUMAZENIL 0.1 MG/ML
0.2 INJECTION, SOLUTION INTRAVENOUS
Status: ACTIVE | OUTPATIENT
Start: 2022-07-21 | End: 2022-07-21

## 2022-07-21 RX ORDER — FENTANYL CITRATE 50 UG/ML
INJECTION, SOLUTION INTRAMUSCULAR; INTRAVENOUS PRN
Status: DISCONTINUED | OUTPATIENT
Start: 2022-07-21 | End: 2022-07-21 | Stop reason: HOSPADM

## 2022-07-21 RX ORDER — ONDANSETRON 2 MG/ML
4 INJECTION INTRAMUSCULAR; INTRAVENOUS EVERY 6 HOURS PRN
Status: DISCONTINUED | OUTPATIENT
Start: 2022-07-21 | End: 2022-07-22 | Stop reason: HOSPADM

## 2022-07-21 RX ORDER — LIDOCAINE 40 MG/G
CREAM TOPICAL
Status: DISCONTINUED | OUTPATIENT
Start: 2022-07-21 | End: 2022-07-22 | Stop reason: HOSPADM

## 2022-07-21 NOTE — H&P
ENDOSCOPY PRE-SEDATION H&P FOR OUTPATIENT PROCEDURES    Fortino Polo  [id number]  1970    Procedure: colonoscopy    Pre-procedure diagnosis: screening    Past medical history:   Past Medical History:   Diagnosis Date     Condyloma      Dyslipidemia      Pilonidal cyst        Past surgical history:   Past Surgical History:   Procedure Laterality Date     DRAIN PILONIDAL CYST SIMPL       REMOVAL OF NAIL PLATE         Current Outpatient Medications   Medication     atorvastatin (LIPITOR) 20 MG tablet     Current Facility-Administered Medications   Medication     fentaNYL (PF) (SUBLIMAZE) injection     lidocaine (LMX4) kit     lidocaine 1 % 0.1-1 mL     midazolam (VERSED) injection     ondansetron (ZOFRAN) injection 4 mg     sodium chloride (PF) 0.9% PF flush 3 mL     sodium chloride (PF) 0.9% PF flush 3 mL     sodium chloride (PF) 0.9% PF flush       Allergies   Allergen Reactions     Nkda [No Known Drug Allergies]      Seasonal Allergies        History of Anesthesia/Sedation Problems: no    Physical Exam:    Mental status: alert  Heart: Normal  Lung: Normal  Assessment of patient's airway: Normal  Other as pertinent for procedure: None     ASA Score: See Provation note    Mallampati score:  II - Faucial pillars and soft palate may be seen, but uvula is masked by the base of the tongue    Assessment/Plan:     The patient is an appropriate candidate to receive sedation.    Informed consent was discussed with the patient/family, including the risks, benefits, potential complications and any alternative options associated with sedation.    Patient assessment completed just prior to sedation and while under constant observation by the provider. Condition determined to be adequate for proceeding with sedation.    The specific risks for the procedure were discussed with the patient at the time of informed consent and include but are not limited to perforation which could require surgery, missing significant neoplasm  or lesion, hemorrhage and adverse sedative complication.      Amrit Carranza MD

## 2022-07-25 LAB
PATH REPORT.COMMENTS IMP SPEC: NORMAL
PATH REPORT.COMMENTS IMP SPEC: NORMAL
PATH REPORT.FINAL DX SPEC: NORMAL
PATH REPORT.GROSS SPEC: NORMAL
PATH REPORT.MICROSCOPIC SPEC OTHER STN: NORMAL
PATH REPORT.RELEVANT HX SPEC: NORMAL
PHOTO IMAGE: NORMAL

## 2022-07-25 PROCEDURE — 88305 TISSUE EXAM BY PATHOLOGIST: CPT | Performed by: PATHOLOGY

## 2022-08-02 ENCOUNTER — TELEPHONE (OUTPATIENT)
Dept: OTOLARYNGOLOGY | Facility: CLINIC | Age: 52
End: 2022-08-02

## 2022-08-02 DIAGNOSIS — D33.3 VESTIBULAR SCHWANNOMA (H): Primary | ICD-10-CM

## 2022-08-03 ENCOUNTER — TELEPHONE (OUTPATIENT)
Dept: GASTROENTEROLOGY | Facility: CLINIC | Age: 52
End: 2022-08-03

## 2022-08-03 NOTE — TELEPHONE ENCOUNTER
Screening Questions    BlueKIND OF PREP RedLOCATION [review exclusion criteria] GreenSEDATION TYPE      1. Are you active on mychart? y    2. What insurance is in the chart? [employer]     3.   Ordering/Referring Provider: Amrit Carranza MD      4. BMI   (If greater than 40 review exclusion criteria [PAC APPT IF [MAC] @ [facility])  25.1  [If yes, BMI OVER 40-EXTENDED PREP]      **(Sedation review/consideration needed)**  Do you have a legal guardian or Medical Power of    and/or are you able to give consent for your medical care?     Can give consent    5. Have you had a positive covid test in the last 90 days?   n -     6.  Are you currently on dialysis?   n [ If yes, G-PREP & HOSPITAL setting ONLY]     7.  Do you have chronic kidney disease?  n [ If yes, G-PREP ]    8.   Do you have a diagnosis of diabetes?   n   [ If yes, G-PREP ]    9.  On a regular basis do you go 3-5 days between bowel movements?   n   [ If yes, EXTENDED PREP]    10.  Are you taking any prescription pain medications on a routine schedule?    n -  [ If yes, EXTENDED PREP] [If yes, MAC]      11.   Do you have any chemical dependencies such as alcohol, street drugs, or methadone?    n [If yes, MAC]    12.   Do you have any history of post-traumatic stress syndrome, severe anxiety or history of psychosis?    n  [If yes, MAC]    13.  [FEMALES] Are you currently pregnant? n/a    If yes, how many weeks?       Respiratory/Heart Screening:  [If yes to any of the following HOSPITAL setting only]     14. Do you have Pulmonary Hypertension [Lungs]?   n       15. Do you have UNCONTROLLED asthma?   n     16.  Do you use daily home oxygen?  n      17. Do you have mod to severe Obstructive Sleep Apnea?         (OKAY @ [facility]  [facility]  SH  PH  RI  [facility] - if pt is not on OXYGEN)  n      18.   Have you had a heart or lung transplant?   n      19.   Have you had a stroke or Transient ischemic attack (TIA - aka  mini stroke ) within 6 months?  (If  yes, please review exclusion criteria)  n     20.   In the past 6 months, have you had any heart related issues including cardiomyopathy or heart attack?   n           If yes, did it require cardiac stenting or other implantable device?         21.   Do you have any implantable devices in your body (pacemaker, defib, LVAD)? (If yes, please review exclusion criteria)  n   22.  Do you take the medication Phentermine?     Yes-> Hold for 7 days before procedure.  Please consult your prescribing provider if you have questions about holding this medication.     No-> Continue to next question.    23. Do you take nitroglycerin?   n           If yes, how often?   (if yes, HOSPITAL setting ONLY)    24.  Are you currently taking any blood thinners?    [If yes, INFORM patient to follw up w/ ORDERING PROVIDER FOR BRIDGING INSTRUCTIONS]     n    25.   Do you transfer independently?                (If NO, please HOSPITAL setting ONLY)  y    26.   Preferred LOCAL Pharmacy for Pre Prescription:         [id number] [place] [address]    Scheduling Details  (Please ask for phone number if not scheduled by patient)      Caller : Fortino Polo    Date of Procedure: 1/26  Surgeon: Dillon  Location: [facility]        Sedation Type: MODERATE l   Conscious Sedation- Needs  for 6 hours after the procedure  MAC/General-Needs  for 24 hours after procedure    n :[Pre-op Required] at [facility] and OR for MAC sedation   (advise patient they will need a pre-op WITH IN 30 DAYS of procedure date)     Type of Procedure Scheduled:   Lower Endoscopy [Colonoscopy]    Which Colonoscopy Prep was Sent?:   [facility] - mag citrate recall      [facility] CF PATIENTS & [facility]'S PATIENTS NEEDS EXTENDED PREP       Informed patient they will need an adult  y  Cannot take any type of public or medical transportation alone    Pre-Procedure [facility] or Externally: home test   **INFORMED OF HOME TESTING & LAB OPTION**        Confirmed Nurse will call to complete assessment y    Additional comments:

## 2022-08-11 NOTE — TELEPHONE ENCOUNTER
[facility] Call Center    Phone Message    May a detailed message be left on voicemail: yes     Reason for Call: Appointment Intake    Referring Provider Name: Dr Zuniga   -pt calling in - states he Needs MRI and Appt w/ Dr Zuniga in [facility] . Please reach out to pt to schedule . Thank you    Action Taken: Other: csc ent    Travel Screening: Not Applicable

## 2022-12-12 NOTE — PROGRESS NOTES
[facility]      Name: Fortino Polo  MRN: [id number]  Age: 52 year old  : 2022     Chief Complaint:   Follow up    History of Present Illness:   Fortino Polo is a 52 year old male with who was seen in the [facility] for follow up of vestibular schwannoma.  He is seen in conjunction with my colleague in neurosurgery, Dr. Zbigniew Hunter. He initially presented with symptoms in 2021 with right sided tinnitus and aural fullness with audiogram showing asymmetric SNHL and MRI demonstrating a vestibular schwannoma. He initially had quite debilitating tinnitus and hearing loss but at our las visit 2021, he reports that he was less bothered by his tinnitus. His tumor and hearing loss were stable, and he elected to continue to monitor.    Today, he reports no noticeable change in his hearing. He says he's now used to his tinnitus and he normally doesn't notice it. He doesn't think it has gotten worse. Exercising seems to make it a bit worse. No changes in facial sensation or facial movement. Says every once in a while he'll have a dazed feeling, but is unsure if it is related to caffeine. It doesn't impact his function overall. No room spinning vertigo.      Review of Systems:   Pertinent items are noted in HPI or as in patient entered ROS below, remainder of complete ROS is negative.    ENT ROS 2022   Constitutional: -   Psychology: -   Ears, Nose, Throat: Hearing loss, Ear pain, Ringing/noise in ears         Physical Exam:   There were no vitals taken for this visit.     Constitutional:  The patient was unaccompanied, well-groomed, and in no acute distress.     Skin: Normal:  warm and pink without rash   Neurologic: Alert and oriented x 3.  CN's III-XII within normal limits.  Voice normal.    Psychiatric: The patient's affect was calm, cooperative, and appropriate.     Communication:  Normal; communicates  verbally, normal voice quality.   Respiratory: Breathing comfortably without stridor or exertion of accessory muscles.    Head/Face:  No lesions or scars. No sinus tenderness.  CN VII intact bilaterally   Eyes: Pupils were equal and reactive.  Extraocular movement intact.     Ears: Pinnae and tragus non-tender.  EAC's and TM's were clear.        Audiogram:  AUDIOGRAM: He underwent an audiogram today. This demonstrated:      Right: Speech reception threshold is 15 dB with 64%/84% word recognition. Tympanogram A type   Left: Speech reception threshold is 0 dB with 100% word recognition. Tympanogram A type     Audiogram was independently reviewed    Right normal hearing sloping to mild to severe sensorineural hearing loss - re: 9/14/21 decreased 10-20 dB at 500-2000 Hz and 6000-800 Hz. Word rec was poorer with higher presentation level but at lower level was stable compared to last year.   Left normal hearing except at 8000 Hz were there is a mild presumed sensorineural hearing loss - stable re: 9/14/21    Imaging:  MRI images were independently reviewed. This demonstrated right side vestibular schwannoma measuring about 12 mm across the petrous face, 11mm into the cistern, and 12 mm into the IAC. There is a very minimal T2 cap. No compression of the cerebellar peduncle. There is no hydrocephalus. There is lower T2 signal in right cochlea. This has increased in size in comparison to his prior MRI from 3/2021 measuring 10mm across the petrous face, 7mm into the cistern, and 10mm into the IAC.    MR Brain w/o & w Contrast  Narrative: EXAM: MR BRAIN W/O & W CONTRAST  12/13/2022 4:16 PM     HISTORY:  vestibular schwannoma; Vestibular schwannoma (H)       COMPARISON:  9/14/2021    TECHNIQUE: Sagittal T1, Axial diffusion-weighted with ADC map,  T2-weighted, turboFLAIR and T1-weighted images of the brain and axial  T1-weighted, 3D T2-weighted and coronal T2-weighted with fat  saturation images centered on the skull base were  obtained without  intravenous contrast. Following intravenous administration of  gadolinium, axial turboFLAIR images of the brain and axial T1-weighted  with fat saturation and coronal T1-weighted images centered on the  skull base were obtained.    CONTRAST: 8.0mL Gadavist.    FINDINGS:    Homogeneously enhancing right cerebellopontine angle mass with  intracanalicular extension measuring 23 x 12 x 15 mm, previously 18 x  10 x 9 mm on 9/14/2021 when measured in the same fashion. No abnormal  signal along the course of the 7th and 8th cranial nerves on the left.  No abnormal signal in the cerebellum or brainstem. The labyrinthine  structures are normal on the T2-weighted images. No abnormal  post-contrast enhancement along the labyrinthine structures. No  abnormal enhancement elsewhere intracranially.    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    There is no mass effect, midline shift, or intracranial hemorrhage.  Scattered periventricular and deep white matter T2 hyperintensities.  The ventricles are proportionate to the cerebral sulci. Diffusion and  susceptibility weighted images are negative for acute/focal  abnormality. Major intracranial vascular structures are within normal  limits.    No suspicious abnormality of the skull marrow signal. Clear paranasal  sinuses. Mastoid air cells are clear. The orbits are normal.  Impression: IMPRESSION:  1. Slightly increased size in the right cerebellopontine angle  vestibular schwannoma with intracanalicular involvement.  2. Moderate chronic small vessel ischemic disease, unchanged.    I have personally reviewed the examination and initial interpretation  and I agree with the findings.    ANSON EVERETT MD         SYSTEM ID:  [id number]         Assessment and Plan:  Fortino Polo is a 52 year old male presenting with:    1) Right side vestibular  schwannoma (first MRI 3/2021, recent MRI 12/2022)   2) Right side normal to profound downsloping sensorineural hearing loss (64/84% WRS)     We reviewed the imaging findings in detail with the patient on monitor specifically in context of tumor that has slightly grown in size. Treatment options and all risks and benefits of  each were discussed, including observation, radiosurgery, and surgical resection. He has all options available. It is a reasonable option to pursue an intervention radiosurgery vs surgical resection given his young age and documented growth of the tumor.       We reviewed the goal of radiosurgery being to stop tumor growth rather than to remove the lesion. We reviewed what this outpatient procedure entails. Success rates are in the 80% range or higher for a tumor of this size, and those with growth do not necessarily go on to have surgery if the tumor remains relatively small, but surgery can be necessary for abundant growth. The risks and side effects of radiosurgery are typically delayed in onset but include facial paralysis, dizziness, hearing loss, and risk to surrounding structures. There is also a small risk of malignant degeneration.     We also reviewed microsurgical excision. He continues to have hearing in his right ear but his speech recognition has declined in the past year. He is a candidate for translabyrinthine vs retrosigmoid approach. He has low T2 signal in right cochlea on MRI that may be representing poor prognostic factor for hearing preservation attempts. We discussed that the surgery has risks of worsening his hearing or facial movement. There are also risks of spinal fluid leak, infection, other neurologic dysfunction, and rare risks of stroke, coma, or death. We also discussed length of hospital stay averages 3 to 5 days, and recovery time up to 6 to 12 weeks with activity restrictions. He understands the risk to the facial nerve increases slightly with growth of the  tumor.     The patient asked insightful questions and indicated that he would like to make decision after considering all options. He has several preplanned trips in May and June next year. We reviewed that if he decides to pursue an intervention after his trips, he will need an MRI in 6 months. The patient understands that he should return to clinic promptly if he experiences worsening or new symptoms including dizziness, facial numbness, headache, or changes in vision. The patient expressed understanding and is in agreement with this plan.  All questions were answered.    Sameera Carlson MD MPH   Fellow Physician  Otology & Neurotology  [facility]     Shirley Zuniga MD  Otology & Neurotology  [facility]    Scribe Preparation Attestation:  IRudolph, a scribe, prepared the chart for today's encounter.     IShirley MD, saw this patient with the resident/fellow and agree with the resident's findings and plan of care as documented in the resident's/fellow's note.

## 2022-12-12 NOTE — PROGRESS NOTES
[facility]      Name: Fortino Polo  : 1970  Referring provider: Shirley Zuniga  2022      Reason for visit: Right vestibular schwannoma, follow up visit    Dear Dr. Rhoades,     It was a pleasure to see Mr. Polo back in the [facility] today in follow up.  As you recall, Mr. Polo is a 52 year old right handed male who presented with right-sided tinnitus, right otalgia, and aural fullness. The audiogram you ordered demonstrated asymmetric hearing loss, so an MRI was performed that showed right CPA mass.  He is seen in conjunction with my colleague in neurotology, Dr. Shirley Zuniga. At our last visit on 2021, his tumor was stable, and we planned to observe and follow-up in one year.    Today, he denies noticing any changes to his hearing or new symptoms since his last visit. He does acknowledge a difference between hearing in his right and left ear. He is able to use a telephone on that side, but prefers to use the left side. He can still localize in space by sound. 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       Review of Systems:   Pertinent items are noted in HPI, remainder of complete ROS is negative.      Physical Exam:   General: No acute distress.   Head: No signs of trauma.    Eyes: Conjunctivae are normal. Pupils are equal. EOMI  Mouth/Throat: Oropharynx moist.  Neck: Normal range of motion.    Resp: No respiratory distress.   MSK: Moves all extremities.  No obvious deformity.  Neuro: The patient is alert and interactive. Speech normal. Facial nerve function is normal.  Facial sensation is normal.  Psych: Normal mood and affect. Behavior is normal.      Audiogram:  AUDIOGRAM: He underwent an audiogram today. This demonstrated:        Right:  64% word recognition at 75 dB and 84% word recognition at 65 dB. PTA 38 dB HL  Left: 100% word recognition at 50 dB.  PTA 6 dB HL    Audiogram was independently  reviewed    Imaging:  MRI Brain 12/13/2022   Increase in size of the right vestibular schwannoma with increased cisternal component. There is no clear contact with the peduncle or brainstem however it is close. There is no T2 cap and there is darkening of the vestible/cochlea on the right side compared to the left.    MRI images were independently reviewed.    Assessment:  1.  Right vestibular schwannoma, growing  2.  AAO-HNS hearing class B on the right    Plan:   1. We reviewed the imaging findings which show an increase in the size of the tumor and a drop in one class in hearing on the right side. We reviewed options for management including observation (though continued growth is expected), radiosurgery, and microsurgical resection. Among these, we recommended radiosurgery or microsurgical resection. We did discuss that while hearing preservation can be attempted, I did feel that his tumor displayed high-risk features so I was not optimistic in being able to preserve servicable hearing.   2. We reviewed risks of surgery and radiosurgery. We carefully discussed the risks of surgery including bleeding, infection, facial nerve weakness/paralysis, facial numbness, swallowing dysfunction, CSF leak,  Subtotal removal, need for further treatments. stroke. Risks of radiation including poor long-term control, increased scar complicating a future surgery should it be necessary, edema/swelling, pin site numbness/pain.  3. He will consider his options and contact the clinic with his decision.  4. As a backup plan, we will arrange a repeat MRI in 6 months with audiogram.  5. We encouraged him to contact us should any questions or concerns arise in advance of his next appointment.      It has been a pleasure to participate in the care of your patient. Please feel free to contact us if we may be of any assistance for Mr. Polo.     Zbigniew Hunter MD    45 minutes spent on the date of the encounter doing chart review,  review of outside records, review of test results, interpretation of tests, patient visit, documentation and discussion with other provider(s)       Scribe Disclosure:  I, Rudolph Hodge, am serving as a scribe to document services personally performed by Zbigniew Hunter MD at this visit, based upon the provider's statements to me. All documentation has been reviewed by the aforementioned provider prior to being entered into the official medical record.

## 2022-12-13 ENCOUNTER — OFFICE VISIT (OUTPATIENT)
Dept: AUDIOLOGY | Facility: CLINIC | Age: 52
End: 2022-12-13
Attending: OTOLARYNGOLOGY
Payer: COMMERCIAL

## 2022-12-13 ENCOUNTER — OFFICE VISIT (OUTPATIENT)
Dept: OTOLARYNGOLOGY | Facility: CLINIC | Age: 52
End: 2022-12-13
Payer: COMMERCIAL

## 2022-12-13 ENCOUNTER — ANCILLARY PROCEDURE (OUTPATIENT)
Dept: MRI IMAGING | Facility: CLINIC | Age: 52
End: 2022-12-13
Attending: OTOLARYNGOLOGY
Payer: COMMERCIAL

## 2022-12-13 DIAGNOSIS — H90.3 ASYMMETRICAL SENSORINEURAL HEARING LOSS: ICD-10-CM

## 2022-12-13 DIAGNOSIS — D33.3 VESTIBULAR SCHWANNOMA (H): ICD-10-CM

## 2022-12-13 DIAGNOSIS — D33.3 ACOUSTIC NEUROMA (H): Primary | ICD-10-CM

## 2022-12-13 DIAGNOSIS — H90.3 ASYMMETRICAL SENSORINEURAL HEARING LOSS: Primary | ICD-10-CM

## 2022-12-13 PROCEDURE — A9585 GADOBUTROL INJECTION: HCPCS | Performed by: STUDENT IN AN ORGANIZED HEALTH CARE EDUCATION/TRAINING PROGRAM

## 2022-12-13 PROCEDURE — 99215 OFFICE O/P EST HI 40 MIN: CPT | Mod: GC | Performed by: OTOLARYNGOLOGY

## 2022-12-13 PROCEDURE — 92557 COMPREHENSIVE HEARING TEST: CPT | Performed by: AUDIOLOGIST-HEARING AID FITTER

## 2022-12-13 PROCEDURE — 92550 TYMPANOMETRY & REFLEX THRESH: CPT | Performed by: AUDIOLOGIST-HEARING AID FITTER

## 2022-12-13 PROCEDURE — 70553 MRI BRAIN STEM W/O & W/DYE: CPT | Mod: GC | Performed by: STUDENT IN AN ORGANIZED HEALTH CARE EDUCATION/TRAINING PROGRAM

## 2022-12-13 PROCEDURE — 99215 OFFICE O/P EST HI 40 MIN: CPT | Performed by: NEUROLOGICAL SURGERY

## 2022-12-13 RX ORDER — GADOBUTROL 604.72 MG/ML
10 INJECTION INTRAVENOUS ONCE
Status: COMPLETED | OUTPATIENT
Start: 2022-12-13 | End: 2022-12-13

## 2022-12-13 RX ADMIN — GADOBUTROL 8 ML: 604.72 INJECTION INTRAVENOUS at 16:17

## 2022-12-13 ASSESSMENT — PAIN SCALES - GENERAL: PAINLEVEL: NO PAIN (0)

## 2022-12-13 NOTE — LETTER
Date:December 27, 2022      Patient was self referred, no letter generated. Do not send.        [facility] Health Information

## 2022-12-13 NOTE — NURSING NOTE
Chief Complaint   Patient presents with     RECHECK     Yearly follow up with MRI and Audio       Real Juares LPN

## 2022-12-13 NOTE — PROGRESS NOTES
AUDIOLOGY REPORT    SUMMARY: Audiology visit completed. See audiogram for results.      RECOMMENDATIONS: Follow-up with ENT.      Freddy Ball, CCC-A, [facility]  Licensed Audiologist  [place] #[id number]

## 2022-12-13 NOTE — LETTER
2022       RE: Fortino Polo  [address] [address] [id number]     Dear Colleague,    Thank you for referring your patient, Fortino Polo, to the [facility] [place] at [facility]. Please see a copy of my visit note below.      [facility]      Name: Fortino Polo  MRN: [id number]  Age: 52 year old  : 2022     Chief Complaint:   Follow up    History of Present Illness:   Fortino Polo is a 52 year old male with who was seen in the [facility] for follow up of vestibular schwannoma.  He is seen in conjunction with my colleague in neurosurgery, Dr. Zbigniew Hunter. He initially presented with symptoms in 2021 with right sided tinnitus and aural fullness with audiogram showing asymmetric SNHL and MRI demonstrating a vestibular schwannoma. He initially had quite debilitating tinnitus and hearing loss but at our las visit 2021, he reports that he was less bothered by his tinnitus. His tumor and hearing loss were stable, and he elected to continue to monitor.    Today, he reports no noticeable change in his hearing. He says he's now used to his tinnitus and he normally doesn't notice it. He doesn't think it has gotten worse. Exercising seems to make it a bit worse. No changes in facial sensation or facial movement. Says every once in a while he'll have a dazed feeling, but is unsure if it is related to caffeine. It doesn't impact his function overall. No room spinning vertigo.      Review of Systems:   Pertinent items are noted in HPI or as in patient entered ROS below, remainder of complete ROS is negative.    ENT ROS 2022   Constitutional: -   Psychology: -   Ears, Nose, Throat: Hearing loss, Ear pain, Ringing/noise in ears         Physical Exam:   There were no vitals taken for this visit.      Constitutional:  The patient was unaccompanied, well-groomed, and in no acute distress.     Skin: Normal:  warm and pink without rash   Neurologic: Alert and oriented x 3.  CN's III-XII within normal limits.  Voice normal.    Psychiatric: The patient's affect was calm, cooperative, and appropriate.     Communication:  Normal; communicates verbally, normal voice quality.   Respiratory: Breathing comfortably without stridor or exertion of accessory muscles.    Head/Face:  No lesions or scars. No sinus tenderness.  CN VII intact bilaterally   Eyes: Pupils were equal and reactive.  Extraocular movement intact.     Ears: Pinnae and tragus non-tender.  EAC's and TM's were clear.        Audiogram:  AUDIOGRAM: He underwent an audiogram today. This demonstrated:      Right: Speech reception threshold is 15 dB with 64%/84% word recognition. Tympanogram A type   Left: Speech reception threshold is 0 dB with 100% word recognition. Tympanogram A type     Audiogram was independently reviewed    Right normal hearing sloping to mild to severe sensorineural hearing loss - re: 9/14/21 decreased 10-20 dB at 500-2000 Hz and 6000-800 Hz. Word rec was poorer with higher presentation level but at lower level was stable compared to last year.   Left normal hearing except at 8000 Hz were there is a mild presumed sensorineural hearing loss - stable re: 9/14/21    Imaging:  MRI images were independently reviewed. This demonstrated right side vestibular schwannoma measuring about 12 mm across the petrous face, 11mm into the cistern, and 12 mm into the IAC. There is a very minimal T2 cap. No compression of the cerebellar peduncle. There is no hydrocephalus. There is lower T2 signal in right cochlea. This has increased in size in comparison to his prior MRI from 3/2021 measuring 10mm across the petrous face, 7mm into the cistern, and 10mm into the IAC.    MR Brain w/o & w Contrast  Narrative: EXAM: MR BRAIN W/O & W CONTRAST  12/13/2022 4:16  PM     HISTORY:  vestibular schwannoma; Vestibular schwannoma (H)       COMPARISON:  9/14/2021    TECHNIQUE: Sagittal T1, Axial diffusion-weighted with ADC map,  T2-weighted, turboFLAIR and T1-weighted images of the brain and axial  T1-weighted, 3D T2-weighted and coronal T2-weighted with fat  saturation images centered on the skull base were obtained without  intravenous contrast. Following intravenous administration of  gadolinium, axial turboFLAIR images of the brain and axial T1-weighted  with fat saturation and coronal T1-weighted images centered on the  skull base were obtained.    CONTRAST: 8.0mL Gadavist.    FINDINGS:    Homogeneously enhancing right cerebellopontine angle mass with  intracanalicular extension measuring 23 x 12 x 15 mm, previously 18 x  10 x 9 mm on 9/14/2021 when measured in the same fashion. No abnormal  signal along the course of the 7th and 8th cranial nerves on the left.  No abnormal signal in the cerebellum or brainstem. The labyrinthine  structures are normal on the T2-weighted images. No abnormal  post-contrast enhancement along the labyrinthine structures. No  abnormal enhancement elsewhere intracranially.    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    There is no mass effect, midline shift, or intracranial hemorrhage.  Scattered periventricular and deep white matter T2 hyperintensities.  The ventricles are proportionate to the cerebral sulci. Diffusion and  susceptibility weighted images are negative for acute/focal  abnormality. Major intracranial vascular structures are within normal  limits.    No suspicious abnormality of the skull marrow signal. Clear paranasal  sinuses. Mastoid air cells are clear. The orbits are normal.  Impression: IMPRESSION:  1. Slightly increased size in the right cerebellopontine angle  vestibular schwannoma with intracanalicular  involvement.  2. Moderate chronic small vessel ischemic disease, unchanged.    I have personally reviewed the examination and initial interpretation  and I agree with the findings.    ANSON EVERETT MD         SYSTEM ID:  [id number]         Assessment and Plan:  Fortino Polo is a 52 year old male presenting with:    1) Right side vestibular schwannoma (first MRI 3/2021, recent MRI 12/2022)   2) Right side normal to profound downsloping sensorineural hearing loss (64/84% WRS)     We reviewed the imaging findings in detail with the patient on monitor specifically in context of tumor that has slightly grown in size. Treatment options and all risks and benefits of  each were discussed, including observation, radiosurgery, and surgical resection. He has all options available. It is a reasonable option to pursue an intervention radiosurgery vs surgical resection given his young age and documented growth of the tumor.       We reviewed the goal of radiosurgery being to stop tumor growth rather than to remove the lesion. We reviewed what this outpatient procedure entails. Success rates are in the 80% range or higher for a tumor of this size, and those with growth do not necessarily go on to have surgery if the tumor remains relatively small, but surgery can be necessary for abundant growth. The risks and side effects of radiosurgery are typically delayed in onset but include facial paralysis, dizziness, hearing loss, and risk to surrounding structures. There is also a small risk of malignant degeneration.     We also reviewed microsurgical excision. He continues to have hearing in his right ear but his speech recognition has declined in the past year. He is a candidate for translabyrinthine vs retrosigmoid approach. He has low T2 signal in right cochlea on MRI that may be representing poor prognostic factor for hearing preservation attempts. We discussed that the surgery has risks of worsening his hearing or facial  movement. There are also risks of spinal fluid leak, infection, other neurologic dysfunction, and rare risks of stroke, coma, or death. We also discussed length of hospital stay averages 3 to 5 days, and recovery time up to 6 to 12 weeks with activity restrictions. He understands the risk to the facial nerve increases slightly with growth of the tumor.     The patient asked insightful questions and indicated that he would like to make decision after considering all options. He has several preplanned trips in May and June next year. We reviewed that if he decides to pursue an intervention after his trips, he will need an MRI in 6 months. The patient understands that he should return to clinic promptly if he experiences worsening or new symptoms including dizziness, facial numbness, headache, or changes in vision. The patient expressed understanding and is in agreement with this plan.  All questions were answered.    Sameera Carlson MD MPH   Fellow Physician  Otology & Neurotology  [facility]     Shirley Zuniga MD  Otology & Neurotology  [facility]    Scribe Preparation Attestation:  IRudolph, a scribe, prepared the chart for today's encounter.     I, Shirley Zuniga MD, saw this patient with the resident/fellow and agree with the resident's findings and plan of care as documented in the resident's/fellow's note.        Again, thank you for allowing me to participate in the care of your patient.      Sincerely,    Shirley Zuniga MD

## 2022-12-13 NOTE — PATIENT INSTRUCTIONS
You were seen today with Dr. Shirley Zuniga and Dr. Zbigniew Hunter. Your primary contact after today's visit is: GALO Edwards    Next steps:  Please return to clinic in June or July of 2023 with a MRI prior.  Call us with any questions or concerns.      How to Contact Us  Send a WhenSoonhart message to your provider.   Call the clinic - your call will be routed appropriately.   ENT Clinic: [phone]   Neurosurgery Clinic: [phone]  To speak directly to an RN Care Coordinator:  Grace RN: [phone]  Malinda RN: [phone]    Note: We do our best to check voicemail frequently throughout the day and make every effort to return calls within 1-2 business days. For urgent matters, please use the general clinic phone numbers listed above.

## 2022-12-13 NOTE — Clinical Note
2022       RE: Fortino Polo  [address] [facility] [address]     Dear Colleague,    Thank you for referring your patient, Fortino Polo, to the [facility]INIC [place] at [facility]. Please see a copy of my visit note below.      [facility]      Name: Fortino Polo  : 1970  Referring provider: Shirley Zuniga  2022      Reason for visit: Right vestibular schwannoma, follow up visit    Dear Dr. Rhoades,     It was a pleasure to see Mr. Polo back in the [facility] today in follow up.  As you recall, Mr. Polo is a 52 year old right handed male who presented with right-sided tinnitus, right otalgia, and aural fullness. The audiogram you ordered demonstrated asymmetric hearing loss, so an MRI was performed that showed right CPA mass.  He is seen in conjunction with my colleague in neurotology, Dr. Shirley Zuniga. At our last visit on 2021, his tumor was stable, and we planned to observe and follow-up in one year.    Today, he denies noticing any changes to his hearing or new symptoms since his last visit. He does acknowledge a difference between hearing in his right and left ear. He is able to use a telephone on that side, but prefers to use the left side. He can still localize in space by sound. 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       Review of Systems:   Pertinent items are noted in HPI, remainder of complete ROS is negative.      Physical Exam:   General: No acute distress.   Head: No signs of trauma.    Eyes: Conjunctivae are normal. Pupils are equal. EOMI  Mouth/Throat: Oropharynx moist.  Neck: Normal range of motion.    Resp: No respiratory distress.   MSK: Moves all extremities.  No obvious deformity.  Neuro: The patient is alert and interactive. Speech normal. Facial nerve function is normal.  Facial sensation is  normal.  Psych: Normal mood and affect. Behavior is normal.      Audiogram:  AUDIOGRAM: He underwent an audiogram today. This demonstrated:        Right:  64% word recognition at 75 dB and 84% word recognition at 65 dB. PTA 38 dB HL  Left: 100% word recognition at 50 dB.  PTA 6 dB HL    Audiogram was independently reviewed    Imaging:  MRI Brain 12/13/2022   Increase in size of the right vestibular schwannoma with increased cisternal component. There is no clear contact with the peduncle or brainstem however it is close. There is no T2 cap and there is darkening of the vestible/cochlea on the right side compared to the left.    MRI images were independently reviewed.    Assessment:  1.  Right vestibular schwannoma, growing  2.  AAO-HNS hearing class B on the right    Plan:   1. We reviewed the imaging findings which show an increase in the size of the tumor and a drop in one class in hearing on the right side. We reviewed options for management including observation (though continued growth is expected), radiosurgery, and microsurgical resection. Among these, we recommended radiosurgery or microsurgical resection. We did discuss that while hearing preservation can be attempted, I did feel that his tumor displayed high-risk features so I was not optimistic in being able to preserve servicable hearing.   2. We reviewed risks of surgery and radiosurgery. We carefully discussed the risks of surgery including bleeding, infection, facial nerve weakness/paralysis, facial numbness, swallowing dysfunction, CSF leak,  Subtotal removal, need for further treatments. stroke. Risks of radiation including poor long-term control, increased scar complicating a future surgery should it be necessary, edema/swelling, pin site numbness/pain.  3. He will consider his options and contact the clinic with his decision.  4. As a backup plan, we will arrange a repeat MRI in 6 months with audiogram.  5. We encouraged him to contact us should  any questions or concerns arise in advance of his next appointment.      It has been a pleasure to participate in the care of your patient. Please feel free to contact us if we may be of any assistance for Mr. Polo.     Zbigniew Hunter MD    45 minutes spent on the date of the encounter doing chart review, review of outside records, review of test results, interpretation of tests, patient visit, documentation and discussion with other provider(s)       Scribe Disclosure:  I, Rudolph Hodge, am serving as a scribe to document services personally performed by Zbigniew Hunter MD at this visit, based upon the provider's statements to me. All documentation has been reviewed by the aforementioned provider prior to being entered into the official medical record.       Again, thank you for allowing me to participate in the care of your patient.      Sincerely,    Zbigniew Hunter MD

## 2022-12-13 NOTE — LETTER
[facility]  [facility] EAR NOSE AND THROAT CLINIC [place] [address] [facility]  4TH FLOOR  [facility] [address]  Phone: [phone]  Fax: [phone]          2022    RE:   Fortino Polo  [address] [facility] [address]      Dear Colleague,    Thank you for referring your patient, Fortino Polo, to the [facility]. Please see a copy of my visit note below.        [facility]      Name: Fortino Polo  : 1970  Referring provider: Shirley Zuniga  2022      Reason for visit: Right vestibular schwannoma, follow up visit    Dear Dr. Rhoades,     It was a pleasure to see Mr. Polo back in the [facility] today in follow up.  As you recall, Mr. Polo is a 52 year old right handed male who presented with right-sided tinnitus, right otalgia, and aural fullness. The audiogram you ordered demonstrated asymmetric hearing loss, so an MRI was performed that showed right CPA mass.  He is seen in conjunction with my colleague in neurotology, Dr. Shirley Zuniga. At our last visit on 2021, his tumor was stable, and we planned to observe and follow-up in one year.    Today, he denies noticing any changes to his hearing or new symptoms since his last visit. He does acknowledge a difference between hearing in his right and left ear. He is able to use a telephone on that side, but prefers to use the left side. He can still localize in space by sound. 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       Review of Systems:   Pertinent items are noted in HPI, remainder of complete ROS is negative.      Physical Exam:   General: No acute distress.   Head: No signs of trauma.    Eyes: Conjunctivae are normal. Pupils are equal. EOMI  Mouth/Throat: Oropharynx moist.  Neck: Normal range of motion.    Resp: No respiratory distress.   MSK: Moves all  extremities.  No obvious deformity.  Neuro: The patient is alert and interactive. Speech normal. Facial nerve function is normal.  Facial sensation is normal.  Psych: Normal mood and affect. Behavior is normal.      Audiogram:  AUDIOGRAM: He underwent an audiogram today. This demonstrated:        Right:  64% word recognition at 75 dB and 84% word recognition at 65 dB. PTA 38 dB HL  Left: 100% word recognition at 50 dB.  PTA 6 dB HL    Audiogram was independently reviewed    Imaging:  MRI Brain 12/13/2022   Increase in size of the right vestibular schwannoma with increased cisternal component. There is no clear contact with the peduncle or brainstem however it is close. There is no T2 cap and there is darkening of the vestible/cochlea on the right side compared to the left.    MRI images were independently reviewed.    Assessment:  1.  Right vestibular schwannoma, growing  2.  AAO-HNS hearing class B on the right    Plan:   1. We reviewed the imaging findings which show an increase in the size of the tumor and a drop in one class in hearing on the right side. We reviewed options for management including observation (though continued growth is expected), radiosurgery, and microsurgical resection. Among these, we recommended radiosurgery or microsurgical resection. We did discuss that while hearing preservation can be attempted, I did feel that his tumor displayed high-risk features so I was not optimistic in being able to preserve servicable hearing.   2. We reviewed risks of surgery and radiosurgery. We carefully discussed the risks of surgery including bleeding, infection, facial nerve weakness/paralysis, facial numbness, swallowing dysfunction, CSF leak,  Subtotal removal, need for further treatments. stroke. Risks of radiation including poor long-term control, increased scar complicating a future surgery should it be necessary, edema/swelling, pin site numbness/pain.  3. He will consider his options and contact  the clinic with his decision.  4. As a backup plan, we will arrange a repeat MRI in 6 months with audiogram.  5. We encouraged him to contact us should any questions or concerns arise in advance of his next appointment.      It has been a pleasure to participate in the care of your patient. Please feel free to contact us if we may be of any assistance for Mr. Polo.     Zbigniew Hunter MD    45 minutes spent on the date of the encounter doing chart review, review of outside records, review of test results, interpretation of tests, patient visit, documentation and discussion with other provider(s)       Scribe Disclosure:  I, Rudolph Hodge, am serving as a scribe to document services personally performed by Zbigniew Hunter MD at this visit, based upon the provider's statements to me. All documentation has been reviewed by the aforementioned provider prior to being entered into the official medical record.         Again, thank you for allowing me to participate in the care of your patient.      Sincerely,    Zbigniew Hunter MD

## 2022-12-13 NOTE — DISCHARGE INSTRUCTIONS
MRI Contrast Discharge Instructions    The IV contrast you received today will pass out of your body in your  urine. This will happen in the next 24 hours. You will not feel this process.  Your urine will not change color.    Drink at least 4 extra glasses of water or juice today (unless your doctor  has restricted your fluids). This reduces the stress on your kidneys.  You may take your regular medicines.    If you are on dialysis: It is best to have dialysis today.    If you have a reaction: Most reactions happen right away. If you have  any new symptoms after leaving the hospital (such as hives or swelling),  call your hospital at the correct number below. Or call your family doctor.  If you have breathing distress or wheezing, call 911.    Special instructions: ***    I have read and understand the above information.    Signature:______________________________________ Date:___________    Staff:__________________________________________ Date:___________     Time:__________    [place] [facility] Departments:    ___[place]: [phone]  ___[facility]: [phone]  ___[place]: [phone] ___[facility]: [phone]  ___[facility]: [phone]  ___[facility]: [phone]  ___[phone]  ___[facility]: [phone]  ___[phone]

## 2023-01-08 ENCOUNTER — HEALTH MAINTENANCE LETTER (OUTPATIENT)
Age: 53
End: 2023-01-08

## 2023-01-13 NOTE — TELEPHONE ENCOUNTER
MEDICAL RECORDS REQUEST   Radiation Oncology  [address] [place] [address]  PHONE: [phone]  Fax: [phone]        FUTURE VISIT INFORMATION                                                   Fortino Ploo, : 1970 scheduled for future visit at [facility] Radiation Oncology      RECORDS REQUESTED FOR VISIT                                                     NOTES STATUS DETAILS   OFFICE NOTE from referring provider Epic 22: Dr. Shirley Zuniga   OFFICE NOTE from neurologist [facility] 22: Dr. Zbigniew Elder   AUDIOGRAM Epic 22   MEDICATION LIST [facility]    LABS     ANYTHING RELATED TO DIAGNOSIS Epic Most recent 22   IMAGING (NEED IMAGES & REPORT)     MRI BRAIN/HEAD PACS 22-21

## 2023-01-16 ENCOUNTER — PRE VISIT (OUTPATIENT)
Dept: RADIATION ONCOLOGY | Facility: CLINIC | Age: 53
End: 2023-01-16

## 2023-01-16 NOTE — NURSING NOTE
Date: 2023   Age: 52 year old  Ethnicity:    Sex: male  : 1970   Lives In: [place], [place]      Diagnosis: Right Acoustic Neuroma    Prior radiation therapy:   Site Treated: at  Facility: at  Dates: at  Dose: at    Site Treated: at  Facility: at  Dates: at  Dose: at    Prior chemotherapy:   Protocol: at  Facility: at  Dates: at    RN time with patient:  Educated on Gamma Knife;    Doctors:  Dr. Shirley Zuniga, Dr. Zbigniew Hunter, Dr. Mayo Rhoades, Dr. Zbigniew Garcia,     Pain at time of consult:  Does pt have a living will:  Does pt have implanted cardiac device:    Has pt fallen in past week:  Does pt feel steady on his feet:     Review Since Diagnosis:    ; pt would notice momentary mild tinnitus on right     2021; tinnitus on right constant, with occasional pain and fullness in the right ear.    Pt was told was possible allergies     3/5/21; hearing test, 88% word recognition at 75 dB on right and 100% word recognition at 50 dB on left     3/5/21; pt saw Dr. Mayo Rhoades, otolaryngologist.  Being seen in consult for recurrent right ear pain and tinnitus.  Pt no balance issues.  Wants a Brain MRI     3/9/21; Brain MRI, 1.7 x 1.0 x 0.9 cm enhancing mass right internal auditory canal and extending into the CPA most consistent with a vestibular schwannoma    3/9/21; Dr. Yoon called pt with results. Refer to U, ordered Elavil to help him sleep thru the tinnitus at night     3/16/21; pt consult with Dr. Zuniga/Elsa, tinnitus now very disturbing.  Noting some disturbing thoughts regarding his life with the tinnitus.  Elavil he is taking tid now as anxiety has increased with the tinnitus.  Discussed options, will see back in 6 months, vestibular therapy to help live with the tinnitus     3/25/21; audiology exam to discuss hearing aides, ideas to help deal with the tinnitus     21; pt had episodes of fatigue, lightheadedness, nausea, loss of appetite, dry mouth and balance issues, pt  tried several dietary changes      9/14/21; hearing test, right 92% word recognition at 55 dB, left 100% word recognition at 40 dB     9/14/21; Brain MRI, no significant change in enhancing right CP angle mass with intracanalicular extension, 1.8 x 1.0 x 0.9 cm    9/14/21; pt saw Dr. Zuniga/Elsa, symptoms improved from July.  Some issues this Summer with paddle boarding but dealing with the tinnitus samuel.  Discussed options again, decision to reevaluate in one year.      12/13/22; hearing test, speech recognition declined in past year,     12/13/22; Brain MRI, increase in size of right CPA vestibular schwannoma with intracanalicular involvement 2.3 x 1.2 x 1.5 cm     12/13/22; pt saw Dr. Zuniga/Elsa, pt feels hearing same, not bothered by the tinnitus anymore has learned to deal with it. Discussed options again, pt wants to think ab out.  Has trips planned for May and June.  If waits until then for treatment will need a new MRI before proceed to make sure still of a size amenable to Gamma Knife if that option is chosen.       Chief Complaint: at consult

## 2023-01-18 ENCOUNTER — TELEPHONE (OUTPATIENT)
Dept: GASTROENTEROLOGY | Facility: CLINIC | Age: 53
End: 2023-01-18
Payer: COMMERCIAL

## 2023-01-18 DIAGNOSIS — Z86.0100 HISTORY OF COLONIC POLYPS: Primary | ICD-10-CM

## 2023-01-18 RX ORDER — BISACODYL 5 MG/1
TABLET, DELAYED RELEASE ORAL
Qty: 4 TABLET | Refills: 0 | Status: SHIPPED | OUTPATIENT
Start: 2023-01-18 | End: 2023-10-05

## 2023-01-18 NOTE — TELEPHONE ENCOUNTER
Attempted to contact patient regarding upcoming Colonoscopy  procedure on 1.26.2023 for pre assessment questions. No answer.     Left message to return call to [phone] #[phone]    Discuss Covid policy.     Pre op exam scheduled: N/A    Arrival time: 0645    Facility location: [facility] [place]; [address], [place] [address]    Sedation type: Conscious sedation     Anticoagulants: No    Electronic implanted devices? No    Diabetic? No    Indication for procedure: hx polyps; 6 months f/u    Bowel prep recommendation: Standard Golytely  d/t mg citrate recall    Prep instructions sent via [id number]. Bowel prep script sent to    [facility] [address] [place], [place] - [address] [facility]      Shobha Youssef RN  Endoscopy Procedure Pre Assessment RN

## 2023-01-19 NOTE — TELEPHONE ENCOUNTER
Pre assessment questions completed for upcoming Colonoscopy  procedure scheduled on 1.26.2023    COVID policy reviewed.     Reviewed procedural arrival time 0645 and facility location [facility]; [address], [place] [address]    Designated  policy reviewed. Instructed to have someone stay 6 hours post procedure.     Anticoagulation/blood thinners? No    Electronic implanted devices? No    Diabetic? No     Patient requested to take Miralax/Magnesium citrate/Dulcolax prep vs. Golytely prep.  Dr. Carranza is okay with patient taking the Miralax prep if the pt is able to get a bottle of magnesium citrate.  If not, patient should follow he Golytely prep instructions.  This information was relayed to the patient.    Reviewed procedure prep instructions.     Patient verbalized understanding and had no questions or concerns at this time.    Shobha Youssef RN  Endoscopy Procedure Pre Assessment RN

## 2023-01-21 NOTE — PROGRESS NOTES
Department of Radiation Oncology                   [facility] [address]  [address] [place]  [address]  Office:  [phone]  Fax:  [phone]   [facility]  [address] [place] [address]  Phone:  [phone]  Fax:  [phone]     RE: Fortino Polo : 1970   MRN: [id number] KACY: 2023     OUTPATIENT VISIT NOTE       PROBLEM: Vestibular Schwannoma on the R side     was seen for initial consultation in the Dept of Radiation Oncology on 2023 at the request of Dr. Amanda ms and Dr. Hunter.     HISTORY OF PRESENT ILLNESS: Mr. Ploo is a 53 yo gentleman with a right-sided acoustic neuroma.      He has a history of momentary to mild tinnitus in the right ear, which became more constant by 2021. This was associated with occasional pain and fullness. He was referred to Dr. Mayo Rhoades in Otolaryngology on 3/5/2021. Exam was unremarkable. However audiogram did reveal severe sensorineural hearing loss at 80-90dB thresholds on the right. Dr. Rhoades thus ordered a brain MRI for further evaluation. Imaging from 3/9/2021 showed a 1.7 x 1.0 x 0.9 cm enhancing mass filling most of the right internal auditory canal and extending into the cerebellopontine angle, most consistent with a vestibular Schwannoma.     Mr. Polo was promptly referred to Dr. Zuniga and Dr. Hunter. Audiogram showed 88% word recognition at 15 dB on the right. Various treatment options were discussed. Given his young age and useful hearing, an initial period of observation was favored. Mr. Polo was quite distraught with tinnitus.  He was recommended to consider cognitive behavioral therapy for tinnitus and vestibular therapy to help with vestibular symptoms.     In 2021, he developed symptoms of fatigue, lightheadedness, nausea, loss of appetite, dry mouth and difficulty with balance, which he managed with dietary modification.     Repeat MRI on 2021  "demonstrated no significant change in the enhancing mass. During his follow up with Dr. Zuniga and Dr. Hunter, he reported that he was less bothered by tinnitus and he denies facial numbness or weakness. He had occasional episodes of imbalance, but this was not activity restricting. Audiogram showed some improvement in word recognition (from 88% to 92%). As such, it was decided to repeat MRI in one year.     His most recent MRI from 12/13/2022 did show tumor growth with the measurements of 2.3 x 1.2 x 1.5 cm. Over the past year, he did not notice much change in his hearing subjectively, although audiogram showed decrease in one class with 64% word recognition at 75 dB and 84% at 65 dB. He was now used to tinnitus. Given his young age and demonstrated tumor growth, Dr. Hunter and Dr. Zuniga recommended consideration of radiosurgery vs. microsurgery. It was felt that the chance of hearing preservation with surgery was not great. Ms. Polo would like to think his options over and is thus referred to us for further discussion of radiosurgery.     On interview today, he states that he has 24/7 tinnitus, but has learned to \"deal with it\" and he is no longer experiencing anxiety from it. He still has serviceable hearing on the right side and can answer phone on that side but feels it is getting more difficult. He denies right sided facial numbness or weakness. He has no trouble swallowing. He states that his balance is off at times but this is not severe.        PAST MEDICAL HISTORY:   Condyloma  Dyslipidemia  Pilonidal cyst, s/p drainage  S/p removal of nail plate    CHEMOTHERAPY HISTORY: none    PAST RADIATION THERAPY HISTORY: None     MEDICATIONS:   Current Outpatient Medications   Medication Sig Dispense Refill     atorvastatin (LIPITOR) 20 MG tablet Take 1 tablet (20 mg) by mouth daily 90 tablet 3     bisacodyl (DULCOLAX) 5 MG EC tablet Take 2 tablets at 3 pm the day before your procedure. If your procedure is " "before 11 am, take 2 additional tablets at 11 pm. If your procedure is after 11 am, take 2 additional tablets at 6 am. For additional instructions refer to your colonoscopy prep instructions. 4 tablet 0     polyethylene glycol (GOLYTELY) 236 g suspension The night before the exam at 6 pm drink an 8-ounce glass every 15 minutes until the jug is half empty. If you arrive before 11 AM: Drink the other half of the Golytely jug at 11 PM night before procedure. If you arrive after 11 AM: Drink the other half of the Golytely jug at 6 AM day of procedure. For additional instructions refer to your colonoscopy prep instructions. 4000 mL 0       ALLERGIES:  allergic to nkda [no known drug allergies] and seasonal allergies.    SOCIAL HISTORY:   Never smoker  Former user of smokeless tobacco    Drinks beer on weekends  Works for [employer]  Planning trips to [place] in May and June      FAMILY HISTORY:   family history includes Cerebrovascular Disease in his father; Genitourinary Problems in his sister; Heart Disease in his father; Hypertension in his mother.    REVIEW OF SYMPTOMS:  A full 14-point review of systems was performed. See HPI for details     PHYSICAL EXAMINATION:    BP (!) 146/92   Pulse 99   Resp 16   Ht 1.854 m (6' 1\")   Wt 86.2 kg (190 lb)   SpO2 95%   BMI 25.07 kg/m     Gen: Appears well, NAD  HEENT: face symmetric, facial muscle movements intact, EOMI, tongue protrudes midline  Hearing: Decreased hearing to finger rub on the right side  CV: well perfused  Resp: breathing comfortably on room air  Neuro: CN II-XII intact with the exception of decreased hearing on the right. Finger to nose intact. Gait and stations are normal.     IMAGING:  3/9/2021      9/14/2021      12/13/2022      ASSESSMENT AND PLAN: In summary, Mr. Polo is a 53 yo gentleman with a right sided vestibular schwannoma. He has gone through a period of observation, with demonstrated growth over the last year. On imaging, the tumor " is abutting the brainstem but does not produce any mass effect. He still has serviceable hearing on that side, though prefers to use the phone on the contralateral left side.     We again reviewed his imaging and compared all 3 MRIs. I agree with Dr. Pandya and Dr. Lemus's recommendations. At this point in time, given the demonstrated growth and his young age, intervention is very reasonable. He is not keen on surgical resection due to its invasive nature. He did share that hearing preservation is not his priority though.     I then discussed the alternative of radiosurgery for the management of his acoustic neuroma. The goal of the treatment is to stop/slow down the tumor growth in order to prevent mass effect on the surrounding structures. With a tumor of this size, the local control rate is approximately 90%. The potential toxicity to the facial and trigeminal nerves have been reported to be less than 5%. The intracanalicular portion of the tumor is in close proximity to the cochlear. It will thus be very challenging to keep the cochlear dose low. He again stated that his priority is tumor control and that he is not concerned about hearing loss associated with the treatment.     We discussed the timing of GK treatment should he decide to proceed. Mr. Polo has two trips planned in May and June to [place]. He is learning towards getting treatment prior to his vacation. However, he would like to further discuss with his wife and he will get back to us with his decision.     Thank you for allowing us to participate in this patient's care.  Please feel free to call with any questions or concerns.       Miguel Kinney M.D./Ph.D.  Radiation Oncologist   Department of Radiation Oncology  [facility]  Phone: [phone]       I reviewed patient's chart, internal/external medical records, imaging studies (including actual images).  I interviewed and counseled the patient face to face.  I  additionally discussed the case with patient's care team.               Miguel Kinney MD

## 2023-01-23 ENCOUNTER — PRE VISIT (OUTPATIENT)
Dept: RADIATION ONCOLOGY | Facility: CLINIC | Age: 53
End: 2023-01-23

## 2023-01-23 ENCOUNTER — OFFICE VISIT (OUTPATIENT)
Dept: RADIATION ONCOLOGY | Facility: CLINIC | Age: 53
End: 2023-01-23
Attending: RADIOLOGY
Payer: COMMERCIAL

## 2023-01-23 VITALS
HEIGHT: 73 IN | BODY MASS INDEX: 25.18 KG/M2 | SYSTOLIC BLOOD PRESSURE: 146 MMHG | RESPIRATION RATE: 16 BRPM | OXYGEN SATURATION: 95 % | WEIGHT: 190 LBS | HEART RATE: 99 BPM | DIASTOLIC BLOOD PRESSURE: 92 MMHG

## 2023-01-23 DIAGNOSIS — D33.3 ACOUSTIC NEUROMA (H): Primary | ICD-10-CM

## 2023-01-23 PROCEDURE — 99205 OFFICE O/P NEW HI 60 MIN: CPT | Performed by: RADIOLOGY

## 2023-01-23 PROCEDURE — G0463 HOSPITAL OUTPT CLINIC VISIT: HCPCS | Performed by: RADIOLOGY

## 2023-01-23 ASSESSMENT — ENCOUNTER SYMPTOMS
DIARRHEA: 0
HEADACHES: 0
VOMITING: 0
CONSTIPATION: 0
BLOOD IN STOOL: 0
NERVOUS/ANXIOUS: 1
PHOTOPHOBIA: 0
SEIZURES: 0
DEPRESSION: 0
FEVER: 0
MUSCULOSKELETAL NEGATIVE: 1
BLURRED VISION: 0
ABDOMINAL PAIN: 0
DOUBLE VISION: 0
WEIGHT LOSS: 0

## 2023-01-23 NOTE — LETTER
Date:January 27, 2023      Patient was self referred, no letter generated. Do not send.        [facility] Health Information

## 2023-01-23 NOTE — PROGRESS NOTES
HPI    Date: 2023   Age: 52 year old  Ethnicity:    Sex: male  : 1970   Lives In: [place], [place]      Diagnosis: Right Acoustic Neuroma    Prior radiation therapy:   never    Prior chemotherapy:   never    RN time with patient: 50 minutes in person  Educated on Gamma Knife; yes    Doctors:  Dr. Shirley Zuniga, Dr. Zbigniew Hunter, Dr. Mayo Rhoades- ent, Dr. Zbigniew Garcia-primary    Pain at time of consult: pt denies pain at time of consult  Does pt have a living will: pt states he has  Does pt have implanted cardiac device: pt has no implanted cardiac device    Has pt fallen in past week: pt has not fallen  Does pt feel steady on his feet:  For the most part steady    Review Since Diagnosis:    2021; tinnitus on right constant, with occasional pain and fullness in the right ear.    Pt was told was possible allergies    Pt went to [facility], got a referral to ENT    3/5/21; hearing test, 88% word recognition at 75 dB on right and 100% word recognition at 50 dB on left     3/5/21; pt saw Dr. Mayo Rhoades, otolaryngologist.  Being seen in consult for recurrent right ear pain and tinnitus.  Pt no balance issues.  Wants a Brain MRI     3/9/21; Brain MRI, 1.7 x 1.0 x 0.9 cm enhancing mass right internal auditory canal and extending into the CPA most consistent with a vestibular schwannoma    3/9/21; Dr. Yoon called pt with results. Refer to U, ordered Elavil to help him sleep thru the tinnitus at night     3/16/21; pt consult with Dr. Zuniga/Elsa, tinnitus now very disturbing.  Noting some disturbing thoughts regarding his life with the tinnitus.  Elavil he is taking tid now as anxiety has increased with the tinnitus.  Discussed options, will see back in 6 months, vestibular therapy to help live with the tinnitus     3/25/21; audiology exam to discuss hearing aides, ideas to help deal with the tinnitus     21; pt had episodes of fatigue, lightheadedness, nausea, loss of appetite, dry  mouth and balance issues, pt tried several dietary changes, decided to stop Elavil as thought causing some of    9/14/21; hearing test, right 92% word recognition at 55 dB, left 100% word recognition at 40 dB     9/14/21; Brain MRI, no significant change in enhancing right CP angle mass with intracanalicular extension, 1.8 x 1.0 x 0.9 cm    9/14/21; pt saw Dr. Zuniga/Elsa, symptoms improved from July.  Some issues this Summer with paddle boarding but dealing with the tinnitus samuel.  Discussed options again, decision to reevaluate in one year.      12/13/22; hearing test, speech recognition declined in past year,     12/13/22; Brain MRI, increase in size of right CPA vestibular schwannoma with intracanalicular involvement 2.3 x 1.2 x 1.5 cm     12/13/22; pt saw Dr. Zuniga/Elsa, pt feels hearing same, not bothered by the tinnitus anymore has learned to deal with it. Discussed options again, pt wants to think ab out.  Has trips planned for May and June.  If waits until then for treatment will need a new MRI before proceed to make sure still of a size amenable to Gamma Knife if that option is chosen.       Chief Complaint: 24/7 tinnitus, pt has learned to deal with this-not causing anxiety anymore.  Pt can use phone on right ear if has to but difficult, no numbness or tingling right side of face, no trouble swallowing, Pt notes prior to this MRI was anxious and had symptoms, once MRI done bettter, feels a lot is anxiety.  Balance at times off but not severe  Review of Systems   Constitutional: Positive for malaise/fatigue. Negative for fever and weight loss.   HENT: Positive for hearing loss.    Eyes: Negative for blurred vision, double vision and photophobia.   Cardiovascular: Negative for chest pain and leg swelling.   Gastrointestinal: Negative for abdominal pain, blood in stool, constipation, diarrhea and vomiting.   Genitourinary: Negative.    Musculoskeletal: Negative.    Neurological: Negative for  seizures and headaches.   Psychiatric/Behavioral: Negative for depression. The patient is nervous/anxious.

## 2023-01-23 NOTE — LETTER
2023         RE: Fortino Polo  [address] [facility] [address]        Dear Colleague,    Thank you for referring your patient, Fortino Polo, to the [facility] RADIATION ONCOLOGY GAMMA KNIFE. Please see a copy of my visit note below.    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 Patel,ms and Dr. Hunter.     HISTORY OF PRESENT ILLNESS: Mr. Polo is a 53 yo gentleman with a right-sided acoustic neuroma.      He has a history of momentary to mild tinnitus in the right ear, which became more constant by 2021. This was associated with occasional pain and fullness. He was referred to Dr. Mayo Rhoades in Otolaryngology on 3/5/2021. Exam was unremarkable. However audiogram did reveal severe sensorineural hearing loss at 80-90dB thresholds on the right. Dr. Rhoades thus ordered a brain MRI for further evaluation. Imaging from 3/9/2021 showed a 1.7 x 1.0 x 0.9 cm enhancing mass filling most of the right internal auditory canal and extending into the cerebellopontine angle, most consistent with a vestibular Schwannoma.     Mr. Polo was promptly referred to Dr. Zuniga and Dr. Hunter. Audiogram showed 88% word recognition at 15 dB on the right. Various treatment options were discussed. Given his young age and useful hearing, an initial period of observation was favored. Mr. Polo was quite distraught with tinnitus.  He was recommended to consider cognitive behavioral therapy for tinnitus  "and vestibular therapy to help with vestibular symptoms.     In July 2021, he developed symptoms of fatigue, lightheadedness, nausea, loss of appetite, dry mouth and difficulty with balance, which he managed with dietary modification.     Repeat MRI on 9/14/2021 demonstrated no significant change in the enhancing mass. During his follow up with Dr. Zuniga and Dr. Hunter, he reported that he was less bothered by tinnitus and he denies facial numbness or weakness. He had occasional episodes of imbalance, but this was not activity restricting. Audiogram showed some improvement in word recognition (from 88% to 92%). As such, it was decided to repeat MRI in one year.     His most recent MRI from 12/13/2022 did show tumor growth with the measurements of 2.3 x 1.2 x 1.5 cm. Over the past year, he did not notice much change in his hearing subjectively, although audiogram showed decrease in one class with 64% word recognition at 75 dB and 84% at 65 dB. He was now used to tinnitus. Given his young age and demonstrated tumor growth, Dr. Hunter and Dr. Zuniga recommended consideration of radiosurgery vs. microsurgery. It was felt that the chance of hearing preservation with surgery was not great. Ms. Polo would like to think his options over and is thus referred to us for further discussion of radiosurgery.     On interview today, he states that he has 24/7 tinnitus, but has learned to \"deal with it\" and he is no longer experiencing anxiety from it. He still has serviceable hearing on the right side and can answer phone on that side but feels it is getting more difficult. He denies right sided facial numbness or weakness. He has no trouble swallowing. He states that his balance is off at times but this is not severe.        PAST MEDICAL HISTORY:   Condyloma  Dyslipidemia  Pilonidal cyst, s/p drainage  S/p removal of nail plate    CHEMOTHERAPY HISTORY: none    PAST RADIATION THERAPY HISTORY: None     MEDICATIONS: " "  Current Outpatient Medications   Medication Sig Dispense Refill     atorvastatin (LIPITOR) 20 MG tablet Take 1 tablet (20 mg) by mouth daily 90 tablet 3     bisacodyl (DULCOLAX) 5 MG EC tablet Take 2 tablets at 3 pm the day before your procedure. If your procedure is before 11 am, take 2 additional tablets at 11 pm. If your procedure is after 11 am, take 2 additional tablets at 6 am. For additional instructions refer to your colonoscopy prep instructions. 4 tablet 0     polyethylene glycol (GOLYTELY) 236 g suspension The night before the exam at 6 pm drink an 8-ounce glass every 15 minutes until the jug is half empty. If you arrive before 11 AM: Drink the other half of the Golytely jug at 11 PM night before procedure. If you arrive after 11 AM: Drink the other half of the Golytely jug at 6 AM day of procedure. For additional instructions refer to your colonoscopy prep instructions. 4000 mL 0       ALLERGIES:  allergic to nkda [no known drug allergies] and seasonal allergies.    SOCIAL HISTORY:   Never smoker  Former user of smokeless tobacco    Drinks beer on weekends  Works for [employer]  Planning trips to [place] in May and June      FAMILY HISTORY:   family history includes Cerebrovascular Disease in his father; Genitourinary Problems in his sister; Heart Disease in his father; Hypertension in his mother.    REVIEW OF SYMPTOMS:  A full 14-point review of systems was performed. See HPI for details     PHYSICAL EXAMINATION:    BP (!) 146/92   Pulse 99   Resp 16   Ht 1.854 m (6' 1\")   Wt 86.2 kg (190 lb)   SpO2 95%   BMI 25.07 kg/m     Gen: Appears well, NAD  HEENT: face symmetric, facial muscle movements intact, EOMI, tongue protrudes midline  Hearing: Decreased hearing to finger rub on the right side  CV: well perfused  Resp: breathing comfortably on room air  Neuro: CN II-XII intact with the exception of decreased hearing on the right. Finger to nose intact. Gait and stations are normal. "     IMAGING:  3/9/2021      9/14/2021      12/13/2022      ASSESSMENT AND PLAN: In summary, Mr. Polo is a 51 yo gentleman with a right sided vestibular schwannoma. He has gone through a period of observation, with demonstrated growth over the last year. On imaging, the tumor is abutting the brainstem but does not produce any mass effect. He still has serviceable hearing on that side, though prefers to use the phone on the contralateral left side.     We again reviewed his imaging and compared all 3 MRIs. I agree with Dr. Pandya and Dr. Lemus's recommendations. At this point in time, given the demonstrated growth and his young age, intervention is very reasonable. He is not keen on surgical resection due to its invasive nature. He did share that hearing preservation is not his priority though.     I then discussed the alternative of radiosurgery for the management of his acoustic neuroma. The goal of the treatment is to stop/slow down the tumor growth in order to prevent mass effect on the surrounding structures. With a tumor of this size, the local control rate is approximately 90%. The potential toxicity to the facial and trigeminal nerves have been reported to be less than 5%. The intracanalicular portion of the tumor is in close proximity to the cochlear. It will thus be very challenging to keep the cochlear dose low. He again stated that his priority is tumor control and that he is not concerned about hearing loss associated with the treatment.     We discussed the timing of GK treatment should he decide to proceed. Mr. Polo has two trips planned in May and June to [place]. He is learning towards getting treatment prior to his vacation. However, he would like to further discuss with his wife and he will get back to us with his decision.     Thank you for allowing us to participate in this patient's care.  Please feel free to call with any questions or concerns.       Miguel Kinney M.D./Ph.D.  Radiation  Oncologist   Department of Radiation Oncology  [facility]  Phone: [phone]       I reviewed patient's chart, internal/external medical records, imaging studies (including actual images).  I interviewed and counseled the patient face to face.  I additionally discussed the case with patient's care team.               Miguel Kinney MD      HPI    Date: 2023   Age: 52 year old  Ethnicity:    Sex: male  : 1970   Lives In: [place], [place]      Diagnosis: Right Acoustic Neuroma    Prior radiation therapy:   never    Prior chemotherapy:   never    RN time with patient: 50 minutes in person  Educated on Gamma Knife; yes    Doctors:  Dr. Shirley Zuniga, Dr. Zbigniew Hunter, Dr. Mayo Rhoades- ent, Dr. Zbigniew Garcia-primary    Pain at time of consult: pt denies pain at time of consult  Does pt have a living will: pt states he has  Does pt have implanted cardiac device: pt has no implanted cardiac device    Has pt fallen in past week: pt has not fallen  Does pt feel steady on his feet:  For the most part steady    Review Since Diagnosis:    2021; tinnitus on right constant, with occasional pain and fullness in the right ear.    Pt was told was possible allergies    Pt went to [facility], got a referral to ENT    3/5/21; hearing test, 88% word recognition at 75 dB on right and 100% word recognition at 50 dB on left     3/5/21; pt saw Dr. Mayo Rhoades, otolaryngologist.  Being seen in consult for recurrent right ear pain and tinnitus.  Pt no balance issues.  Wants a Brain MRI     3/9/21; Brain MRI, 1.7 x 1.0 x 0.9 cm enhancing mass right internal auditory canal and extending into the CPA most consistent with a vestibular schwannoma    3/9/21; Dr. Yoon called pt with results. Refer to U, ordered Elavil to help him sleep thru the tinnitus at night     3/16/21; pt consult with Dr. Zuniga/Elsa, tinnitus now very disturbing.  Noting some disturbing thoughts regarding his life  with the tinnitus.  Elavil he is taking tid now as anxiety has increased with the tinnitus.  Discussed options, will see back in 6 months, vestibular therapy to help live with the tinnitus     3/25/21; audiology exam to discuss hearing aides, ideas to help deal with the tinnitus     7/12/21; pt had episodes of fatigue, lightheadedness, nausea, loss of appetite, dry mouth and balance issues, pt tried several dietary changes, decided to stop Elavil as thought causing some of    9/14/21; hearing test, right 92% word recognition at 55 dB, left 100% word recognition at 40 dB     9/14/21; Brain MRI, no significant change in enhancing right CP angle mass with intracanalicular extension, 1.8 x 1.0 x 0.9 cm    9/14/21; pt saw Dr. Zuniga/Elsa, symptoms improved from July.  Some issues this Summer with paddle boarding but dealing with the tinnitus samuel.  Discussed options again, decision to reevaluate in one year.      12/13/22; hearing test, speech recognition declined in past year,     12/13/22; Brain MRI, increase in size of right CPA vestibular schwannoma with intracanalicular involvement 2.3 x 1.2 x 1.5 cm     12/13/22; pt saw Dr. Zuniga/Elsa, pt feels hearing same, not bothered by the tinnitus anymore has learned to deal with it. Discussed options again, pt wants to think ab out.  Has trips planned for May and June.  If waits until then for treatment will need a new MRI before proceed to make sure still of a size amenable to Gamma Knife if that option is chosen.       Chief Complaint: 24/7 tinnitus, pt has learned to deal with this-not causing anxiety anymore.  Pt can use phone on right ear if has to but difficult, no numbness or tingling right side of face, no trouble swallowing, Pt notes prior to this MRI was anxious and had symptoms, once MRI done bettter, feels a lot is anxiety.  Balance at times off but not severe  Review of Systems   Constitutional: Positive for malaise/fatigue. Negative for fever and  weight loss.   HENT: Positive for hearing loss.    Eyes: Negative for blurred vision, double vision and photophobia.   Cardiovascular: Negative for chest pain and leg swelling.   Gastrointestinal: Negative for abdominal pain, blood in stool, constipation, diarrhea and vomiting.   Genitourinary: Negative.    Musculoskeletal: Negative.    Neurological: Negative for seizures and headaches.   Psychiatric/Behavioral: Negative for depression. The patient is nervous/anxious.                    Again, thank you for allowing me to participate in the care of your patient.        Sincerely,        Miguel Kinney MD

## 2023-01-26 ENCOUNTER — HOSPITAL ENCOUNTER (OUTPATIENT)
Facility: AMBULATORY SURGERY CENTER | Age: 53
Discharge: HOME | End: 2023-01-26
Attending: INTERNAL MEDICINE | Admitting: INTERNAL MEDICINE
Payer: COMMERCIAL

## 2023-01-26 VITALS
SYSTOLIC BLOOD PRESSURE: 130 MMHG | DIASTOLIC BLOOD PRESSURE: 88 MMHG | OXYGEN SATURATION: 94 % | HEART RATE: 68 BPM | RESPIRATION RATE: 16 BRPM | TEMPERATURE: 98.1 F

## 2023-01-26 LAB — COLONOSCOPY: NORMAL

## 2023-01-26 PROCEDURE — 45385 COLONOSCOPY W/LESION REMOVAL: CPT

## 2023-01-26 PROCEDURE — 88305 TISSUE EXAM BY PATHOLOGIST: CPT | Performed by: PATHOLOGY

## 2023-01-26 PROCEDURE — G8918 PT W/O PREOP ORDER IV AB PRO: HCPCS

## 2023-01-26 PROCEDURE — G8907 PT DOC NO EVENTS ON DISCHARG: HCPCS

## 2023-01-26 RX ORDER — LIDOCAINE 40 MG/G
CREAM TOPICAL
Status: DISCONTINUED | OUTPATIENT
Start: 2023-01-26 | End: 2023-01-27 | Stop reason: HOSPADM

## 2023-01-26 RX ORDER — PROCHLORPERAZINE MALEATE 10 MG
10 TABLET ORAL EVERY 6 HOURS PRN
Status: DISCONTINUED | OUTPATIENT
Start: 2023-01-26 | End: 2023-01-27 | Stop reason: HOSPADM

## 2023-01-26 RX ORDER — NALOXONE HYDROCHLORIDE 0.4 MG/ML
0.4 INJECTION, SOLUTION INTRAMUSCULAR; INTRAVENOUS; SUBCUTANEOUS
Status: DISCONTINUED | OUTPATIENT
Start: 2023-01-26 | End: 2023-01-27 | Stop reason: HOSPADM

## 2023-01-26 RX ORDER — FENTANYL CITRATE 50 UG/ML
INJECTION, SOLUTION INTRAMUSCULAR; INTRAVENOUS PRN
Status: DISCONTINUED | OUTPATIENT
Start: 2023-01-26 | End: 2023-01-26 | Stop reason: HOSPADM

## 2023-01-26 RX ORDER — FLUMAZENIL 0.1 MG/ML
0.2 INJECTION, SOLUTION INTRAVENOUS
Status: ACTIVE | OUTPATIENT
Start: 2023-01-26 | End: 2023-01-26

## 2023-01-26 RX ORDER — ONDANSETRON 4 MG/1
4 TABLET, ORALLY DISINTEGRATING ORAL EVERY 6 HOURS PRN
Status: DISCONTINUED | OUTPATIENT
Start: 2023-01-26 | End: 2023-01-27 | Stop reason: HOSPADM

## 2023-01-26 RX ORDER — NALOXONE HYDROCHLORIDE 0.4 MG/ML
0.2 INJECTION, SOLUTION INTRAMUSCULAR; INTRAVENOUS; SUBCUTANEOUS
Status: DISCONTINUED | OUTPATIENT
Start: 2023-01-26 | End: 2023-01-27 | Stop reason: HOSPADM

## 2023-01-26 RX ORDER — ONDANSETRON 2 MG/ML
4 INJECTION INTRAMUSCULAR; INTRAVENOUS EVERY 6 HOURS PRN
Status: DISCONTINUED | OUTPATIENT
Start: 2023-01-26 | End: 2023-01-27 | Stop reason: HOSPADM

## 2023-01-26 RX ORDER — ONDANSETRON 2 MG/ML
4 INJECTION INTRAMUSCULAR; INTRAVENOUS
Status: DISCONTINUED | OUTPATIENT
Start: 2023-01-26 | End: 2023-01-27 | Stop reason: HOSPADM

## 2023-01-26 NOTE — H&P
ENDOSCOPY PRE-SEDATION H&P FOR OUTPATIENT PROCEDURES    Fortino Polo  [id number]  1970    Procedure: colonoscopy    Pre-procedure diagnosis: hx polyps    Past medical history:   Past Medical History:   Diagnosis Date     Acoustic neuroma (H)      Condyloma      Dyslipidemia      Pilonidal cyst        Past surgical history:   Past Surgical History:   Procedure Laterality Date     COLONOSCOPY N/A 7/21/2022    Procedure: COLONOSCOPY, FLEXIBLE, WITH LESION REMOVAL USING SNARE;  Surgeon: Amrit Carranza MD;  Location: [facility] OR     COLONOSCOPY WITH CO2 INSUFFLATION N/A 7/21/2022    Procedure: COLONOSCOPY, WITH CO2 INSUFFLATION;  Surgeon: Amrit Carranza MD;  Location: [facility] OR     DRAIN PILONIDAL CYST SIMPL       REMOVAL OF NAIL PLATE         Current Outpatient Medications   Medication     atorvastatin (LIPITOR) 20 MG tablet     bisacodyl (DULCOLAX) 5 MG EC tablet     magnesium oxide 200 MG TABS     polyethylene glycol (GOLYTELY) 236 g suspension     Current Facility-Administered Medications   Medication     fentaNYL (PF) (SUBLIMAZE) injection     flumazenil (ROMAZICON) injection 0.2 mg     lidocaine (LMX4) kit     lidocaine 1 % 0.1-1 mL     midazolam (VERSED) injection     naloxone (NARCAN) injection 0.2 mg     naloxone (NARCAN) injection 0.2 mg     naloxone (NARCAN) injection 0.4 mg     naloxone (NARCAN) injection 0.4 mg     ondansetron (ZOFRAN ODT) ODT tab 4 mg    Or     ondansetron (ZOFRAN) injection 4 mg     ondansetron (ZOFRAN) injection 4 mg     prochlorperazine (COMPAZINE) injection 10 mg    Or     prochlorperazine (COMPAZINE) tablet 10 mg     sodium chloride (PF) 0.9% PF flush 3 mL     sodium chloride (PF) 0.9% PF flush 3 mL     sodium chloride (PF) 0.9% PF flush       Allergies   Allergen Reactions     Nkda [No Known Drug Allergies]      Seasonal Allergies        History of Anesthesia/Sedation Problems: no    Physical Exam:    Mental status: alert  Heart: Normal  Lung:  Normal  Assessment of patient's airway: Normal  Other as pertinent for procedure: None     ASA Score: See Provation note    Mallampati score:  I - Faucial pillars, soft palate, and uvula are visible    Assessment/Plan:     The patient is an appropriate candidate to receive sedation.    Informed consent was discussed with the patient/family, including the risks, benefits, potential complications and any alternative options associated with sedation.    Patient assessment completed just prior to sedation and while under constant observation by the provider. Condition determined to be adequate for proceeding with sedation.    The specific risks for the procedure were discussed with the patient at the time of informed consent and include but are not limited to perforation which could require surgery, missing significant neoplasm or lesion, hemorrhage and adverse sedative complication.      Amrit Carranza MD

## 2023-02-01 DIAGNOSIS — D33.3 BENIGN NEOPLASM OF CRANIAL NERVE (H): Primary | ICD-10-CM

## 2023-03-30 ENCOUNTER — HOSPITAL ENCOUNTER (OUTPATIENT)
Dept: MRI IMAGING | Facility: CLINIC | Age: 53
Discharge: HOME | End: 2023-03-30
Attending: RADIOLOGY
Payer: COMMERCIAL

## 2023-03-30 ENCOUNTER — OFFICE VISIT (OUTPATIENT)
Dept: RADIATION ONCOLOGY | Facility: CLINIC | Age: 53
End: 2023-03-30
Attending: RADIOLOGY
Payer: COMMERCIAL

## 2023-03-30 VITALS
HEART RATE: 88 BPM | RESPIRATION RATE: 16 BRPM | OXYGEN SATURATION: 97 % | DIASTOLIC BLOOD PRESSURE: 83 MMHG | SYSTOLIC BLOOD PRESSURE: 143 MMHG

## 2023-03-30 DIAGNOSIS — D33.3 VESTIBULAR SCHWANNOMA (H): Primary | ICD-10-CM

## 2023-03-30 DIAGNOSIS — D33.3 BENIGN NEOPLASM OF CRANIAL NERVE (H): ICD-10-CM

## 2023-03-30 DIAGNOSIS — D33.3 BENIGN NEOPLASM OF CRANIAL NERVE (H): Primary | ICD-10-CM

## 2023-03-30 PROCEDURE — 77334 RADIATION TREATMENT AID(S): CPT | Performed by: RADIOLOGY

## 2023-03-30 PROCEDURE — 77300 RADIATION THERAPY DOSE PLAN: CPT | Performed by: RADIOLOGY

## 2023-03-30 PROCEDURE — 77432 STEREOTACTIC RADIATION TRMT: CPT | Performed by: RADIOLOGY

## 2023-03-30 PROCEDURE — 250N000011 HC RX IP 250 OP 636: Performed by: RADIOLOGY

## 2023-03-30 PROCEDURE — 77295 3-D RADIOTHERAPY PLAN: CPT | Mod: 26 | Performed by: RADIOLOGY

## 2023-03-30 PROCEDURE — 77334 RADIATION TREATMENT AID(S): CPT | Mod: 26 | Performed by: RADIOLOGY

## 2023-03-30 PROCEDURE — 255N000002 HC RX 255 OP 636: Performed by: RADIOLOGY

## 2023-03-30 PROCEDURE — 61798 SRS CRANIAL LESION COMPLEX: CPT | Performed by: OTOLARYNGOLOGY

## 2023-03-30 PROCEDURE — 77263 THER RADIOLOGY TX PLNG CPLX: CPT | Performed by: RADIOLOGY

## 2023-03-30 PROCEDURE — 250N000013 HC RX MED GY IP 250 OP 250 PS 637: Performed by: RADIOLOGY

## 2023-03-30 PROCEDURE — 77300 RADIATION THERAPY DOSE PLAN: CPT | Mod: 26 | Performed by: RADIOLOGY

## 2023-03-30 PROCEDURE — 77370 RADIATION PHYSICS CONSULT: CPT | Performed by: RADIOLOGY

## 2023-03-30 PROCEDURE — 70552 MRI BRAIN STEM W/DYE: CPT

## 2023-03-30 PROCEDURE — 250N000009 HC RX 250: Performed by: RADIOLOGY

## 2023-03-30 PROCEDURE — 77371 SRS MULTISOURCE: CPT | Performed by: RADIOLOGY

## 2023-03-30 PROCEDURE — 70552 MRI BRAIN STEM W/DYE: CPT | Mod: 26 | Performed by: RADIOLOGY

## 2023-03-30 PROCEDURE — 61800 APPLY SRS HEADFRAME ADD-ON: CPT | Performed by: OTOLARYNGOLOGY

## 2023-03-30 PROCEDURE — 77295 3-D RADIOTHERAPY PLAN: CPT | Performed by: RADIOLOGY

## 2023-03-30 PROCEDURE — A9585 GADOBUTROL INJECTION: HCPCS | Performed by: RADIOLOGY

## 2023-03-30 RX ORDER — ACETAMINOPHEN 325 MG/1
650 TABLET ORAL EVERY 4 HOURS PRN
Status: DISCONTINUED | OUTPATIENT
Start: 2023-03-30 | End: 2023-03-30 | Stop reason: HOSPADM

## 2023-03-30 RX ORDER — GADOBUTROL 604.72 MG/ML
10 INJECTION INTRAVENOUS ONCE
Status: COMPLETED | OUTPATIENT
Start: 2023-03-30 | End: 2023-03-30

## 2023-03-30 RX ORDER — LORAZEPAM 0.5 MG/1
.5-1 TABLET ORAL
Status: DISCONTINUED | OUTPATIENT
Start: 2023-03-30 | End: 2023-03-30 | Stop reason: HOSPADM

## 2023-03-30 RX ORDER — HYDROCODONE BITARTRATE AND ACETAMINOPHEN 5; 325 MG/1; MG/1
1-2 TABLET ORAL EVERY 6 HOURS PRN
Status: DISCONTINUED | OUTPATIENT
Start: 2023-03-30 | End: 2023-03-30 | Stop reason: HOSPADM

## 2023-03-30 RX ORDER — LORAZEPAM 0.5 MG/1
.5-2 TABLET ORAL
Status: COMPLETED | OUTPATIENT
Start: 2023-03-30 | End: 2023-03-30

## 2023-03-30 RX ORDER — ONDANSETRON 2 MG/ML
4 INJECTION INTRAMUSCULAR; INTRAVENOUS
Status: DISCONTINUED | OUTPATIENT
Start: 2023-03-30 | End: 2023-03-30 | Stop reason: HOSPADM

## 2023-03-30 RX ORDER — ONDANSETRON 4 MG/1
8 TABLET, ORALLY DISINTEGRATING ORAL EVERY 6 HOURS PRN
Status: DISCONTINUED | OUTPATIENT
Start: 2023-03-30 | End: 2023-03-30 | Stop reason: HOSPADM

## 2023-03-30 RX ORDER — LIDOCAINE 40 MG/G
1 CREAM TOPICAL SEE ADMIN INSTRUCTIONS
Status: COMPLETED | OUTPATIENT
Start: 2023-03-30 | End: 2023-03-30

## 2023-03-30 RX ADMIN — GADOBUTROL 10 ML: 604.72 INJECTION INTRAVENOUS at 07:22

## 2023-03-30 RX ADMIN — LORAZEPAM 2 MG: 0.5 TABLET ORAL at 05:29

## 2023-03-30 RX ADMIN — LIDOCAINE HYDROCHLORIDE,EPINEPHRINE BITARTRATE 30 ML: 20; .01 INJECTION, SOLUTION INFILTRATION; PERINEURAL at 05:29

## 2023-03-30 RX ADMIN — LIDOCAINE 1 G: 40 CREAM TOPICAL at 05:29

## 2023-03-30 NOTE — PROGRESS NOTES
03/30/23    DIAGNOSIS:  Right vestibular schwannoma    PROCEDURES:   1. Placement of Leksell Stereotactic Head Frame   2. Stereotactic MRI of the Brain   3. Generation of Stereotactic Treatment Plan   4. Gamma Knife Treatment      INDICATION: Fortino Polo is a 52 year old male with a right internal auditory canal and cerebellopontine angle lesion. They elected to pursue stereotactic radiosurgery for tumor control.  Informed consent was obtained after reviewing the common and serious risks and benefits and alternatives.     DESCRIPTION OF PROCEDURE:        After obtaining informed consent, the patient was brought to the Gamma Knife Suite and correctly identified.  1 mg of oral Ativan was administered for head frame placement.  The Leksell stereotactic head frame was then secured to the patient s head using four pins. The intended pin sites were prepared in the usual sterile fashion and infiltrated with buffered 1% lidocaine with epinephrine (1:100,000). Care was taken not to over-tighten the pins, and therefore avoid torque of the frame.  Accurate and secure placement of the frame was verified.     The patient was then transferred to the MRI scanner, and a stereotactic MRI of the brain was obtained.  The MRI images were transferred to the Gamma Knife treatment planning station, and a three-dimensional stereotactic treatment plan was generated as follows:      The tumor volume, brainstem, and cochlear volumes were outlined. Tumor treatment volume was 2. cm3. Using the gamma plan software and manual editing, 31 shots of collimator size 4, 8, 16 mm were used to create a highly conformal treatment plan.     The plan was turned over to Dr. Kinney and Dr. Wilkinson from the physics team for  and administration. The prescribed dose was 12.5Gy at the 60% isodose line.  The patient returned to the Gamma Knife suite and the treatment was administered.      At the completion of treatment, the patient was taken  out of the treatment unit, and the stereotactic head frame removed. The pin sites were inspected for integrity.  A sterile dressing was applied and the patient was given discharge instructions and a plan for follow-up.      Dr. Zuniga was present for all neuro-otological portions of the procedure.

## 2023-03-30 NOTE — LETTER
3/30/2023         RE: Fortino Polo  [address] [facility] [address]        Dear Colleague,    Thank you for referring your patient, Fortino Polo, to the [facility] RADIATION ONCOLOGY GAMMA KNIFE. Please see a copy of my visit note below.    Name: Fortino oPlo.  : 1970 Medical Record #: [id number]  Diagnosis: Acoustic Neuroma  Date of Treatment: 2023  Referring Physicians: Amrit Carranza, Tumor Registry      GAMMA KNIFE PROCEDURE NOTE and TREATMENT SUMMARY    Treatment Summary:     Treatment Site Dose Modality Collimators Shots   R Acoustic Neuroma 12.5 Gy to 60% isodose Cobalt 60 4,8,16mm 31             DESCRIPTION OF PROCEDURE:  On 3/30/2023the patient was brought to the Gamma Knife suite at the [facility].      HEAD IMMOBILIZATION: Head frame put on by the neurosurgeon  MEDICATION: Sedation and local anesthetic    The patient was then taken to the Department of Radiology where a stereotactic brain MRI was performed.  The patient was admitted to the  care area while treatment planning was completed.      These Images were then sent to the [employer] Gamma Knife computer system where a three dimensional stereotactic plan was generated.   The patient was then treated on the Leksell Gamma Knife.  Treatment involved 1 target.    The Leksell Gamma Knife IconTM Plan software was used to create a highly conformal dose distribution using the number and size collimators detailed above.     TREATMENT:    The patient was brought to the Gamma Knife suite.  The patient was identified by 2 methods. A timeout was performed to confirm the correct patient and correct procedure. The site was identified by an MRI image and treatment planning software, which defined the treatment area.     A cone beam CT was done and compared to the MRI to look for frame motion  We evaluated the frame manually to ensure it was still secure.     The  treatment was delivered using the Lesksell Gamma Knife IconTM without complication.  The head immobilization was removed and the patient was discharged home in stable condition.  The patient tolerated the treatment well and had no complications.        PAIN MANAGEMENT: None required      FOLLOW-UP PLANS:  Follow up: 3 Months      Imaging Before Follow Up: Brain MRI    Approved by:  Miguel Kinney MD/PhD    PHYSICIST: Norberto Wilkinson, PhD        Again, thank you for allowing me to participate in the care of your patient.        Sincerely,        Miguel Kinney MD

## 2023-03-30 NOTE — LETTER
3/30/2023         RE: Fortino Polo  [address] [facility] [address]        Dear Colleague,    Thank you for referring your patient, Fortino Polo, to the [facility] RADIATION ONCOLOGY GAMMA KNIFE. Please see a copy of my visit note below.    03/30/23    DIAGNOSIS:  Right vestibular schwannoma    PROCEDURES:   1. Placement of Leksell Stereotactic Head Frame   2. Stereotactic MRI of the Brain   3. Generation of Stereotactic Treatment Plan   4. Gamma Knife Treatment      INDICATION: Fortino Polo is a 52 year old male with a right internal auditory canal and cerebellopontine angle lesion. They elected to pursue stereotactic radiosurgery for tumor control.  Informed consent was obtained after reviewing the common and serious risks and benefits and alternatives.     DESCRIPTION OF PROCEDURE:        After obtaining informed consent, the patient was brought to the Gamma Knife Suite and correctly identified.  1 mg of oral Ativan was administered for head frame placement.  The Leksell stereotactic head frame was then secured to the patient s head using four pins. The intended pin sites were prepared in the usual sterile fashion and infiltrated with buffered 1% lidocaine with epinephrine (1:100,000). Care was taken not to over-tighten the pins, and therefore avoid torque of the frame.  Accurate and secure placement of the frame was verified.     The patient was then transferred to the MRI scanner, and a stereotactic MRI of the brain was obtained.  The MRI images were transferred to the Gamma Knife treatment planning station, and a three-dimensional stereotactic treatment plan was generated as follows:      The tumor volume, brainstem, and cochlear volumes were outlined. Tumor treatment volume was 2. cm3. Using the gamma plan software and manual editing, 31 shots of collimator size 4, 8, 16 mm were used to create a highly conformal treatment plan.     The plan was turned over to Dr. Kinney and  Dr. Wilkinson from the physics team for  and administration. The prescribed dose was 12.5Gy at the 60% isodose line.  The patient returned to the Gamma Knife suite and the treatment was administered.      At the completion of treatment, the patient was taken out of the treatment unit, and the stereotactic head frame removed. The pin sites were inspected for integrity.  A sterile dressing was applied and the patient was given discharge instructions and a plan for follow-up.      Dr. Zuniga was present for all neuro-otological portions of the procedure.             Again, thank you for allowing me to participate in the care of your patient.        Sincerely,        Shirley Zuniga MD

## 2023-03-30 NOTE — PROGRESS NOTES
Name: Fortino Polo  : 1970 Medical Record #: [id number]  Diagnosis: Acoustic Neuroma  Date of Treatment: 2023  Referring Physicians: Amrit Carranza, Tumor Registry      GAMMA KNIFE PROCEDURE NOTE and TREATMENT SUMMARY    Treatment Summary:     Treatment Site Dose Modality Collimators Shots   R Acoustic Neuroma 12.5 Gy to 60% isodose Cobalt 60 4,8,16mm 31             DESCRIPTION OF PROCEDURE:  On 3/30/2023the patient was brought to the Gamma Knife suite at the [facility].      HEAD IMMOBILIZATION: Head frame put on by the neurosurgeon  MEDICATION: Sedation and local anesthetic    The patient was then taken to the Department of Radiology where a stereotactic brain MRI was performed.  The patient was admitted to the  care area while treatment planning was completed.      These Images were then sent to the [employer] Gamma Knife computer system where a three dimensional stereotactic plan was generated.   The patient was then treated on the [employer] Gamma Knife.  Treatment involved 1 target.    The [employer] Gamma Knife IconTM Plan software was used to create a highly conformal dose distribution using the number and size collimators detailed above.     TREATMENT:    The patient was brought to the Gamma Knife suite.  The patient was identified by 2 methods. A timeout was performed to confirm the correct patient and correct procedure. The site was identified by an MRI image and treatment planning software, which defined the treatment area.     A cone beam CT was done and compared to the MRI to look for frame motion  We evaluated the frame manually to ensure it was still secure.     The treatment was delivered using the [employer] Gamma Knife IconTM without complication.  The head immobilization was removed and the patient was discharged home in stable condition.  The patient tolerated the treatment well and had no complications.        PAIN MANAGEMENT: None  required      FOLLOW-UP PLANS:  Follow up: 3 Months      Imaging Before Follow Up: Brain MRI    Approved by:  Miguel Kinney MD/PhD    PHYSICIST: Norberto Wilkinson, PhD

## 2023-03-30 NOTE — LETTER
Date:March 31, 2023      Patient was self referred, no letter generated. Do not send.        [facility] Health Information

## 2023-03-31 ENCOUNTER — TELEPHONE (OUTPATIENT)
Dept: NEUROSURGERY | Facility: CLINIC | Age: 53
End: 2023-03-31
Payer: COMMERCIAL

## 2023-03-31 DIAGNOSIS — D33.3 ACOUSTIC NEUROMA (H): Primary | ICD-10-CM

## 2023-03-31 NOTE — TELEPHONE ENCOUNTER
----- Message from Pamela K Caflisch, RN sent at 3/30/2023 11:38 AM CDT -----  Regarding: post gamma knife  Hi,   Hector had his Gamma Knife treatment today 3/30/23 and needs a Brain MRI in one year and then to see Dr. Zuniga/Elsa in [facility] please.  If you would please order and get that scheduled it would be greatly appreciated.  Thanks   Winter

## 2023-04-03 ENCOUNTER — TELEPHONE (OUTPATIENT)
Dept: RADIATION ONCOLOGY | Facility: CLINIC | Age: 53
End: 2023-04-03
Payer: COMMERCIAL

## 2023-04-03 NOTE — TELEPHONE ENCOUNTER
A call was placed to Hector in follow up to his Gamma Knife treatment on 3/30/23.  Hector stated that the pin sites were quite tender the first day and one of the posterior sites had some serous oozing for a few days but that has stopped and today all of the pin sites seem to be healing with no problem.  Hector stated that the right side of the top of his head still feels numb, I told him that can be normal.  Hector denied nausea or headache and felt overall he was doing just fine post Gamma Knife.

## 2023-04-10 ENCOUNTER — TELEPHONE (OUTPATIENT)
Dept: RADIATION ONCOLOGY | Facility: CLINIC | Age: 53
End: 2023-04-10
Payer: COMMERCIAL

## 2023-04-10 NOTE — TELEPHONE ENCOUNTER
A My Chart message was received from Hector regarding some numbness persisting on the top of his head post Gamma Knife treatment 3/30/23.  Hector also noted a plugged feeling in the right ear where his acoustic neuroma is we treated.  Hector noted the full feeling goes away after a couple of days.  I spoke to Dr. Kinney and got back to Hector via a My Chart message.  I told Hector the numbness can persist in some people quite awhile.  I also told Hector if the fullness persists beyond a couple of days and/or other symptoms develop, balance issues, dizziness etc and are bothersome and not going away to let us know.

## 2023-05-23 DIAGNOSIS — E78.00 HIGH CHOLESTEROL: ICD-10-CM

## 2023-05-23 NOTE — LETTER
May 24, 2023    Fortino Polo  [address] [address]    Dear Fortino,       We recently received a refill request for atorvastatin (LIPITOR) 20 MG tablet.  We have refilled this for a one time 90 day supply only because you are due for a:    Medication Check office visit      Please call at your earliest convenience so that there will not be a delay with your future refills.          Thank you,   Your [facility] Care Team/AL  [phone]

## 2023-05-24 RX ORDER — ATORVASTATIN CALCIUM 20 MG/1
TABLET, FILM COATED ORAL
Qty: 90 TABLET | Refills: 0 | Status: SHIPPED | OUTPATIENT
Start: 2023-05-24 | End: 2023-08-28

## 2023-06-01 ENCOUNTER — NURSE TRIAGE (OUTPATIENT)
Dept: FAMILY MEDICINE | Facility: CLINIC | Age: 53
End: 2023-06-01
Payer: COMMERCIAL

## 2023-06-01 DIAGNOSIS — U07.1 INFECTION DUE TO 2019 NOVEL CORONAVIRUS: Primary | ICD-10-CM

## 2023-06-01 NOTE — TELEPHONE ENCOUNTER
RN COVID TREATMENT VISIT  06/01/23      The patient has been triaged and does not require a higher level of care.    Fortino Polo  52 year old  Current weight? 190#    Has the patient been seen by a primary care provider at an [facility] or [facility] within the past two years? Yes.   Have you been in close proximity to/do you have a known exposure to a person with a confirmed case of influenza? No.     General treatment eligibility:  Date of positive COVID test (PCR or at home)?  5/30/23    Are you or have you been hospitalized for this COVID-19 infection? No.   Have you received monoclonal antibodies or antiviral treatment for COVID-19 since this positive test? No.   Do you have any of the following conditions that place you at risk of being very sick from COVID-19?   - Age 50 years or older  - Cerebrovascular disease, any history of stroke or transient ischemic attack (TIA)   Yes, patient has at least one high risk condition as noted above.     Current COVID symptoms:   - fever or chills  - cough  - shortness of breath or difficulty breathing  - fatigue  - muscle or body aches  - headache  Yes. Patient has at least one symptom as selected.     How many days since symptoms started? 5 days or less. Established patient, 12 years or older weighing at least 88.2 lbs, who has symptoms that started in the past 5 days, has not been hospitalized nor received treatment already, and is at risk for being very sick from COVID-19.     Treatment eligibility by RN:    Are you currently pregnant or nursing? No    Do you have a clinically significant hypersensitivity to nirmatrelvir or ritonavir, or toxic epidermal necrolysis (TEN) or Montero-Eulalio Syndrome? No    Do you have a history of hepatitis, any hepatic impairment on the Problem List (such as Child-Sloan Class C, cirrhosis, fatty liver disease, alcoholic liver disease), or was the last liver lab (hepatic panel, ALT, AST, ALK Phos,  bilirubin) elevated in the past 6 months? No    Do you have any history of severe renal impairment (eGFR < 30mL/min)? No    Is patient eligible to continue?   Yes, patient meets all eligibility requirements for the RN COVID treatment (as denoted by all no responses above).     Current Outpatient Medications   Medication Sig Dispense Refill     atorvastatin (LIPITOR) 20 MG tablet TAKE 1 TABLET BY MOUTH EVERY DAY 90 tablet 0     bisacodyl (DULCOLAX) 5 MG EC tablet Take 2 tablets at 3 pm the day before your procedure. If your procedure is before 11 am, take 2 additional tablets at 11 pm. If your procedure is after 11 am, take 2 additional tablets at 6 am. For additional instructions refer to your colonoscopy prep instructions. 4 tablet 0     magnesium oxide 200 MG TABS Take 200 mg by mouth daily       polyethylene glycol (GOLYTELY) 236 g suspension The night before the exam at 6 pm drink an 8-ounce glass every 15 minutes until the jug is half empty. If you arrive before 11 AM: Drink the other half of the Golytely jug at 11 PM night before procedure. If you arrive after 11 AM: Drink the other half of the Golytely jug at 6 AM day of procedure. For additional instructions refer to your colonoscopy prep instructions. 4000 mL 0       Medications from List 1 of the standing order (on medications that exclude the use of Paxlovid) that patient is taking: NONE. Is patient taking [employer]'s Wort? No  Is patient taking Lauren's Wort or any meds from List 1? No.   Medications from List 2 of the standing order (on meds that provider needs to adjust) that patient is taking: NONE. Is patient on any of the meds from List 2? No.   Medications from List 3 of standing order (on meds that a RN needs to adjust) that patient is taking: atorvastatin (Lipitor): Instructed patient to stop atorvastatin while taking Paxlovid and restart atorvastatin 1 day after the completion of Paxlovid.  Is patient on any meds from List 3? Yes. Patient is on  meds from list 3. No meds require a provider visit and at least one med required RN to adjust.     Paxlovid has an approximate 90% reduction in hospitalization. Paxlovid can possibly cause altered sense of taste, diarrhea (loose, watery stools), high blood pressure, muscle aches.     Would patient like a Paxlovid prescription?   Yes.   Lab Results   Component Value Date    GFRESTIMATED 90 04/28/2022       Was last eGFR reduced? No, eGFR 60 or greater/ No Result on record. Patient can receive the normal renal function dose. Paxlovid Rx sent to [facility] [phone]  [address], [address]  [place] [address]    Hours:  Mon-Fri: 7:00a - 7:00p    Drive-thru services available.    Temporary change to home medications: None    All medication adjustments (holds, etc) were discussed with the patient and patient was asked to repeat back (teachback) their med adjustment.  Did patient understand med adjustment? Yes, patient repeated back and understood correctly.        Reviewed the following instructions with the patient:    Paxlovid (nimatrelvir and ritonavir)    How it works  Two medicines (nirmatrelvir and ritonavir) are taken together. They stop the virus from growing. Less amount of virus is easier for your body to fight.    How to take    Medicine comes in a daily container with both medicine tablets. Take by mouth twice daily (once in the morning, once at night) for 5 days.    The number of tablets to take varies by patient.    Don't chew or break capsules. Swallow whole.    When to take  Take as soon as possible after positive COVID-19 test result, and within 5 days of your first symptoms.    Possible side effects  Can cause altered sense of taste, diarrhea (loose, watery stools), high blood pressure, muscle aches.    Stacy Posada, RN

## 2023-06-01 NOTE — TELEPHONE ENCOUNTER
Reason for Disposition    MILD difficulty breathing (e.g., minimal/no SOB at rest, SOB with walking, pulse <100)    [1] HIGH RISK for severe COVID complications (e.g., weak immune system, age > 64 years, obesity with BMI of 30 or higher, pregnant, chronic lung disease or other chronic medical condition) AND [2] COVID symptoms (e.g., cough, fever)  (Exceptions: Already seen by PCP and no new or worsening symptoms.)    Additional Information    Negative: SEVERE difficulty breathing (e.g., struggling for each breath, speaks in single words)    Negative: Difficult to awaken or acting confused (e.g., disoriented, slurred speech)    Negative: Bluish (or gray) lips or face now    Negative: Shock suspected (e.g., cold/pale/clammy skin, too weak to stand, low BP, rapid pulse)    Negative: Sounds like a life-threatening emergency to the triager    Negative: [1] Diagnosed or suspected COVID-19 AND [2] symptoms lasting 3 or more weeks    Negative: [1] COVID-19 exposure AND [2] no symptoms    Negative: COVID-19 vaccine reaction suspected (e.g., fever, headache, muscle aches) occurring 1 to 3 days after getting vaccine    Negative: COVID-19 vaccine, questions about    Negative: [1] Lives with someone known to have influenza (flu test positive) AND [2] flu-like symptoms (e.g., cough, runny nose, sore throat, SOB; with or without fever)    Negative: [1] Adult with possible COVID-19 symptoms AND [2] triager concerned about severity of symptoms or other causes    Negative: COVID-19 and breastfeeding, questions about    Negative: SEVERE or constant chest pain or pressure  (Exception: Mild central chest pain, present only when coughing.)    Negative: MODERATE difficulty breathing (e.g., speaks in phrases, SOB even at rest, pulse 100-120)    Negative: Headache and stiff neck (can't touch chin to chest)    Negative: Oxygen level (e.g., pulse oximetry) 90 percent or lower    Negative: Chest pain or pressure  (Exception: MILD central  chest pain, present only when coughing)    Negative: Patient sounds very sick or weak to the triager    Protocols used: CORONAVIRUS (COVID-19) DIAGNOSED OR [id number]

## 2023-06-02 ENCOUNTER — HEALTH MAINTENANCE LETTER (OUTPATIENT)
Age: 53
End: 2023-06-02

## 2023-08-26 DIAGNOSIS — E78.00 HIGH CHOLESTEROL: ICD-10-CM

## 2023-08-28 ENCOUNTER — MYC REFILL (OUTPATIENT)
Dept: FAMILY MEDICINE | Facility: CLINIC | Age: 53
End: 2023-08-28
Payer: COMMERCIAL

## 2023-08-28 DIAGNOSIS — E78.00 HIGH CHOLESTEROL: ICD-10-CM

## 2023-08-28 RX ORDER — ATORVASTATIN CALCIUM 20 MG/1
TABLET, FILM COATED ORAL
Qty: 90 TABLET | Refills: 0 | OUTPATIENT
Start: 2023-08-28

## 2023-08-28 NOTE — LETTER
August 29, 2023    Fortino Polo  [address] [address] [facility] [address]    Dear Fortino,       We recently received a refill request for atorvastatin (LIPITOR) 20 MG tablet.  We have refilled this for a one time 90 day supply only because you are due for a:    Medication Check office visit      Please call at your earliest convenience so that there will not be a delay with your future refills.          Thank you,   Your [facility] Care Team/AL  [phone]

## 2023-08-29 RX ORDER — ATORVASTATIN CALCIUM 20 MG/1
20 TABLET, FILM COATED ORAL DAILY
Qty: 90 TABLET | Refills: 0 | Status: SHIPPED | OUTPATIENT
Start: 2023-08-29 | End: 2023-10-05

## 2023-09-29 ASSESSMENT — ENCOUNTER SYMPTOMS
HEMATOCHEZIA: 0
EYE PAIN: 0
NERVOUS/ANXIOUS: 0
DIZZINESS: 0
HEARTBURN: 0
SORE THROAT: 0
DYSURIA: 0
COUGH: 1
JOINT SWELLING: 0
ARTHRALGIAS: 0
NAUSEA: 0
HEADACHES: 0
SHORTNESS OF BREATH: 0
CONSTIPATION: 0
HEMATURIA: 0
FEVER: 0
MYALGIAS: 0
PALPITATIONS: 0
CHILLS: 0
PARESTHESIAS: 0
FREQUENCY: 0
ABDOMINAL PAIN: 0
WEAKNESS: 0
DIARRHEA: 0

## 2023-10-05 ENCOUNTER — OFFICE VISIT (OUTPATIENT)
Dept: FAMILY MEDICINE | Facility: CLINIC | Age: 53
End: 2023-10-05
Payer: COMMERCIAL

## 2023-10-05 VITALS
HEIGHT: 73 IN | RESPIRATION RATE: 16 BRPM | TEMPERATURE: 97.7 F | WEIGHT: 209.6 LBS | SYSTOLIC BLOOD PRESSURE: 132 MMHG | HEART RATE: 109 BPM | OXYGEN SATURATION: 95 % | BODY MASS INDEX: 27.78 KG/M2 | DIASTOLIC BLOOD PRESSURE: 72 MMHG

## 2023-10-05 DIAGNOSIS — R05.9 COUGH, UNSPECIFIED TYPE: ICD-10-CM

## 2023-10-05 DIAGNOSIS — E78.00 HIGH CHOLESTEROL: ICD-10-CM

## 2023-10-05 DIAGNOSIS — Z00.00 ROUTINE GENERAL MEDICAL EXAMINATION AT A HEALTH CARE FACILITY: Primary | ICD-10-CM

## 2023-10-05 DIAGNOSIS — N52.9 ERECTILE DYSFUNCTION, UNSPECIFIED ERECTILE DYSFUNCTION TYPE: ICD-10-CM

## 2023-10-05 PROCEDURE — 91320 SARSCV2 VAC 30MCG TRS-SUC IM: CPT | Performed by: PHYSICIAN ASSISTANT

## 2023-10-05 PROCEDURE — 36415 COLL VENOUS BLD VENIPUNCTURE: CPT | Performed by: PHYSICIAN ASSISTANT

## 2023-10-05 PROCEDURE — 90480 ADMN SARSCOV2 VAC 1/ONLY CMP: CPT | Performed by: PHYSICIAN ASSISTANT

## 2023-10-05 PROCEDURE — 80053 COMPREHEN METABOLIC PANEL: CPT | Performed by: PHYSICIAN ASSISTANT

## 2023-10-05 PROCEDURE — 99396 PREV VISIT EST AGE 40-64: CPT | Mod: 25 | Performed by: PHYSICIAN ASSISTANT

## 2023-10-05 PROCEDURE — 80061 LIPID PANEL: CPT | Performed by: PHYSICIAN ASSISTANT

## 2023-10-05 PROCEDURE — 90682 RIV4 VACC RECOMBINANT DNA IM: CPT | Performed by: PHYSICIAN ASSISTANT

## 2023-10-05 PROCEDURE — 99213 OFFICE O/P EST LOW 20 MIN: CPT | Mod: 25 | Performed by: PHYSICIAN ASSISTANT

## 2023-10-05 PROCEDURE — 90471 IMMUNIZATION ADMIN: CPT | Performed by: PHYSICIAN ASSISTANT

## 2023-10-05 RX ORDER — ATORVASTATIN CALCIUM 20 MG/1
20 TABLET, FILM COATED ORAL DAILY
Qty: 90 TABLET | Refills: 1 | Status: SHIPPED | OUTPATIENT
Start: 2023-10-05 | End: 2024-05-20

## 2023-10-05 RX ORDER — SILDENAFIL 100 MG/1
50-100 TABLET, FILM COATED ORAL DAILY PRN
Qty: 90 TABLET | Refills: 0 | Status: SHIPPED | OUTPATIENT
Start: 2023-10-05 | End: 2023-10-31

## 2023-10-05 ASSESSMENT — ENCOUNTER SYMPTOMS
NAUSEA: 0
DIARRHEA: 0
HEARTBURN: 0
DYSURIA: 0
HEADACHES: 0
COUGH: 1
JOINT SWELLING: 0
MYALGIAS: 0
PARESTHESIAS: 0
DIZZINESS: 0
HEMATURIA: 0
EYE PAIN: 0
HEMATOCHEZIA: 0
SHORTNESS OF BREATH: 0
WEAKNESS: 0
FREQUENCY: 0
CONSTIPATION: 0
SORE THROAT: 0
FEVER: 0
ARTHRALGIAS: 0
PALPITATIONS: 0
CHILLS: 0
NERVOUS/ANXIOUS: 0
ABDOMINAL PAIN: 0

## 2023-10-05 ASSESSMENT — PATIENT HEALTH QUESTIONNAIRE - PHQ9
SUM OF ALL RESPONSES TO PHQ QUESTIONS 1-9: 0
SUM OF ALL RESPONSES TO PHQ QUESTIONS 1-9: 0
10. IF YOU CHECKED OFF ANY PROBLEMS, HOW DIFFICULT HAVE THESE PROBLEMS MADE IT FOR YOU TO DO YOUR WORK, TAKE CARE OF THINGS AT HOME, OR GET ALONG WITH OTHER PEOPLE: NOT DIFFICULT AT ALL

## 2023-10-05 ASSESSMENT — PAIN SCALES - GENERAL: PAINLEVEL: NO PAIN (0)

## 2023-10-05 NOTE — PROGRESS NOTES
Post iSUBJECTIVE:   CC: Hector is an 53 year old who presents for preventative health visit.       10/5/2023     3:11 PM   Additional Questions   Roomed by Nina CASTREJON   Accompanied by self       Healthy Habits:     Getting at least 3 servings of Calcium per day:  Yes    Bi-annual eye exam:  Yes    Dental care twice a year:  Yes    Sleep apnea or symptoms of sleep apnea:  None    Diet:  Low fat/cholesterol    Frequency of exercise:  4-5 days/week    Duration of exercise:  Greater than 60 minutes    Taking medications regularly:  Yes    Medication side effects:  None    Additional concerns today:  No  Off and on plugged sensate in right ear for about 6 months.   History of Acoustic Neuroma and radiation. Wants to make sure he does not have much wax in his ear before he goes back and sees a specialist.    Also had COVID a few months ago and then also recent cold symptoms about 4 weeks ago and since then he has had a lingering dry cough.  He states he is off-and-on.  He is occasionally used nasal spray Flonase which helps a little bit.  He struggles with allergies but does not feel like that is been an issue this time of year for him normally.  He denies any wheezing or shortness of breath.  He does have occasional GERD symptoms that he takes Tums for with certain foods.    Hyperlipidemia Follow-Up    Are you regularly taking any medication or supplement to lower your cholesterol?   Yes- lipitor  Are you having muscle aches or other side effects that you think could be caused by your cholesterol lowering medication?  No    1 yr history of ED. Loss of function.       Today's PHQ-9 Score:       10/5/2023     2:25 PM   PHQ-9 SCORE   PHQ-9 Total Score MyChart 0   PHQ-9 Total Score 0       Social History     Tobacco Use    Smoking status: Never    Smokeless tobacco: Former     Quit date: 7/26/2010   Substance Use Topics    Alcohol use: Yes     Comment: weekends beer             9/29/2023    10:47 AM   Alcohol Use   Prescreen: >3  drinks/day or >7 drinks/week? No       Last PSA:   Prostate Specific Antigen Screen   Date Value Ref Range Status   2022 1.37 0.00 - 4.00 ug/L Final       Reviewed orders with patient. Reviewed health maintenance and updated orders accordingly - Yes  Lab work is in process  Labs reviewed in EPIC  BP Readings from Last 3 Encounters:   10/05/23 132/72   23 139/89   23 (!) 143/83    Wt Readings from Last 3 Encounters:   10/05/23 95.1 kg (209 lb 9.6 oz)   23 86.2 kg (190 lb)   22 91.6 kg (202 lb)                  Patient Active Problem List   Diagnosis    CARDIOVASCULAR SCREENING; LDL GOAL LESS THAN 160    BMI 27.0-27.9,adult    SNHL (sensory-neural hearing loss), asymmetrical    Tinnitus, right    Acoustic neuroma (H)    High cholesterol    Erectile dysfunction, unspecified erectile dysfunction type     Past Surgical History:   Procedure Laterality Date    COLONOSCOPY N/A 2022    Procedure: COLONOSCOPY, FLEXIBLE, WITH LESION REMOVAL USING SNARE;  Surgeon: Amrit Carranza MD;  Location: [facility] OR    COLONOSCOPY N/A 2023    Procedure: COLONOSCOPY, FLEXIBLE, WITH LESION REMOVAL USING SNARE;  Surgeon: Amrit Carranza MD;  Location: [facility] OR    COLONOSCOPY WITH CO2 INSUFFLATION N/A 2022    Procedure: COLONOSCOPY, WITH CO2 INSUFFLATION;  Surgeon: Amrit Carranza MD;  Location: [facility] OR    COLONOSCOPY WITH CO2 INSUFFLATION N/A 2023    Procedure: COLONOSCOPY, WITH CO2 INSUFFLATION;  Surgeon: Amrit Carranza MD;  Location: [facility] OR    DRAIN PILONIDAL CYST SIMPL      REMOVAL OF NAIL PLATE         Social History     Tobacco Use    Smoking status: Never    Smokeless tobacco: Former     Quit date: 2010   Substance Use Topics    Alcohol use: Yes     Comment: weekends beer     Family History   Problem Relation Age of Onset    Hypertension Mother     Heart Disease Father         PFO    Cerebrovascular Disease Father          81 possible  pneumonia    Genitourinary Problems Sister         liver          Current Outpatient Medications   Medication Sig Dispense Refill    atorvastatin (LIPITOR) 20 MG tablet Take 1 tablet (20 mg) by mouth daily 90 tablet 1    sildenafil (VIAGRA) 100 MG tablet Take 0.5-1 tablets ( mg) by mouth daily as needed (ED) 90 tablet 0     Allergies   Allergen Reactions    Nkda [No Known Drug Allergy]     Seasonal Allergies        Reviewed and updated as needed this visit by clinical staff   Tobacco  Allergies  Meds              Reviewed and updated as needed this visit by Provider                   Past Medical History:   Diagnosis Date    Acoustic neuroma (H)     Condyloma     Dyslipidemia     Hypertension March 2021    Pilonidal cyst       Past Surgical History:   Procedure Laterality Date    COLONOSCOPY N/A 7/21/2022    Procedure: COLONOSCOPY, FLEXIBLE, WITH LESION REMOVAL USING SNARE;  Surgeon: Amrit Carranza MD;  Location: [facility]    COLONOSCOPY N/A 1/26/2023    Procedure: COLONOSCOPY, FLEXIBLE, WITH LESION REMOVAL USING SNARE;  Surgeon: Amrit Carranza MD;  Location: [facility] OR    COLONOSCOPY WITH CO2 INSUFFLATION N/A 7/21/2022    Procedure: COLONOSCOPY, WITH CO2 INSUFFLATION;  Surgeon: Amrit Carranza MD;  Location: [facility]    COLONOSCOPY WITH CO2 INSUFFLATION N/A 1/26/2023    Procedure: COLONOSCOPY, WITH CO2 INSUFFLATION;  Surgeon: Amrit Carranza MD;  Location: [facility] OR    DRAIN PILONIDAL CYST SIMPL      REMOVAL OF NAIL PLATE         Review of Systems   Constitutional:  Negative for chills and fever.   HENT:  Positive for hearing loss. Negative for congestion, ear pain and sore throat.    Eyes:  Negative for pain and visual disturbance.   Respiratory:  Positive for cough. Negative for shortness of breath.    Cardiovascular:  Negative for chest pain, palpitations and peripheral edema.   Gastrointestinal:  Negative for abdominal pain, constipation, diarrhea, heartburn,  "hematochezia and nausea.   Genitourinary:  Positive for impotence. Negative for dysuria, frequency, genital sores, hematuria, penile discharge and urgency.   Musculoskeletal:  Negative for arthralgias, joint swelling and myalgias.   Skin:  Negative for rash.   Neurological:  Negative for dizziness, weakness, headaches and paresthesias.   Psychiatric/Behavioral:  Negative for mood changes. The patient is not nervous/anxious.        OBJECTIVE:   /72   Pulse 109   Temp 97.7  F (36.5  C) (Tympanic)   Resp 16   Ht 1.854 m (6' 1\")   Wt 95.1 kg (209 lb 9.6 oz)   SpO2 95%   BMI 27.65 kg/m      Physical Exam  GENERAL: healthy, alert and no distress  EYES: Eyes grossly normal to inspection, PERRL and conjunctivae and sclerae normal  HENT: ear canals and TM's normal, nose and mouth without ulcers or lesions- no cerumen impaction.   NECK: no adenopathy, no asymmetry, masses, or scars and thyroid normal to palpation  RESP: lungs clear to auscultation - no rales, rhonchi or wheezes  CV: regular rate and rhythm, normal S1 S2, no S3 or S4, no murmur, click or rub, no peripheral edema and peripheral pulses strong  ABDOMEN: soft, nontender, no hepatosplenomegaly, no masses and bowel sounds normal   (male): normal male genitalia without lesions or urethral discharge, no hernia  MS: no gross musculoskeletal defects noted, no edema  SKIN: no suspicious lesions or rashes  NEURO: Normal strength and tone, mentation intact and speech normal  PSYCH: mentation appears normal, affect normal/bright    Diagnostic Test Results:  Labs reviewed in Epic    ASSESSMENT/PLAN:       ICD-10-CM    1. Routine general medical examination at a health care facility  Z00.00       2. High cholesterol  E78.00 atorvastatin (LIPITOR) 20 MG tablet     Lipid panel reflex to direct LDL Fasting     Comprehensive metabolic panel (BMP + Alb, Alk Phos, ALT, AST, Total. Bili, TP)     OFFICE/OUTPT VISIT,EST,LEVL III      3. Erectile dysfunction, unspecified " "erectile dysfunction type  N52.9 sildenafil (VIAGRA) 100 MG tablet     OFFICE/OUTPT VISIT,EST,LEVL III      4. Cough, unspecified type  R05.9 OFFICE/OUTPT VISIT,EST,LEVL III      Work on Healthy diet and exercise. Getting heart rate elevated for 30 mins most days of week. Labs pending  medical conditions are stable. meds refilled. Labs pending  Talk to patient about his concerns.  We will do a trial of Viagra.  Warning signs side effects were discussed.  Recheck as needed.  Talk to patient and his concerns at this point I think he has a post illness cough.  We talked about conservative management with antihistamines and continue Flonase nasal spray on a more routine basis.  He is to follow-up in a month if he does not continue to make progress for continued work-up.  Warning signs discussed.      COUNSELING:   Reviewed preventive health counseling, as reflected in patient instructions       Regular exercise       Healthy diet/nutrition       Vision screening      BMI:   Estimated body mass index is 27.65 kg/m  as calculated from the following:    Height as of this encounter: 1.854 m (6' 1\").    Weight as of this encounter: 95.1 kg (209 lb 9.6 oz).   Weight management plan: Discussed healthy diet and exercise guidelines      He reports that he has never smoked. He quit smokeless tobacco use about 13 years ago.            Zbigniew Garcia PA-C  [facility] ANDOVERAnswers submitted by the patient for this visit:  Patient Health Questionnaire (Submitted on 10/5/2023)  If you checked off any problems, how difficult have these problems made it for you to do your work, take care of things at home, or get along with other people?: Not difficult at all  PHQ9 TOTAL SCORE: 0    "

## 2023-10-06 LAB
ALBUMIN SERPL BCG-MCNC: 4.6 G/DL (ref 3.5–5.2)
ALP SERPL-CCNC: 41 U/L (ref 40–129)
ALT SERPL W P-5'-P-CCNC: 41 U/L (ref 0–70)
ANION GAP SERPL CALCULATED.3IONS-SCNC: 11 MMOL/L (ref 7–15)
AST SERPL W P-5'-P-CCNC: 42 U/L (ref 0–45)
BILIRUB SERPL-MCNC: 0.3 MG/DL
BUN SERPL-MCNC: 13.5 MG/DL (ref 6–20)
CALCIUM SERPL-MCNC: 9.8 MG/DL (ref 8.6–10)
CHLORIDE SERPL-SCNC: 101 MMOL/L (ref 98–107)
CHOLEST SERPL-MCNC: 171 MG/DL
CREAT SERPL-MCNC: 1.25 MG/DL (ref 0.67–1.17)
DEPRECATED HCO3 PLAS-SCNC: 29 MMOL/L (ref 22–29)
EGFRCR SERPLBLD CKD-EPI 2021: 69 ML/MIN/1.73M2
GLUCOSE SERPL-MCNC: 80 MG/DL (ref 70–99)
HDLC SERPL-MCNC: 32 MG/DL
LDLC SERPL CALC-MCNC: 92 MG/DL
NONHDLC SERPL-MCNC: 139 MG/DL
POTASSIUM SERPL-SCNC: 4.1 MMOL/L (ref 3.4–5.3)
PROT SERPL-MCNC: 7.6 G/DL (ref 6.4–8.3)
SODIUM SERPL-SCNC: 141 MMOL/L (ref 135–145)
TRIGL SERPL-MCNC: 234 MG/DL

## 2023-10-06 NOTE — RESULT ENCOUNTER NOTE
Mr. Polo,    All of your labs were normal/near normal for you.  Work on Healthy diet and exercise. Getting heart rate elevated for 30 mins most days of week.    Please contact the clinic if you have additional questions.  Thank you.    Sincerely,    Zbigniew Garcia PA-C

## 2023-10-30 ENCOUNTER — MYC MEDICAL ADVICE (OUTPATIENT)
Dept: FAMILY MEDICINE | Facility: CLINIC | Age: 53
End: 2023-10-30
Payer: COMMERCIAL

## 2023-10-30 DIAGNOSIS — N52.9 ERECTILE DYSFUNCTION, UNSPECIFIED ERECTILE DYSFUNCTION TYPE: ICD-10-CM

## 2023-10-30 NOTE — TELEPHONE ENCOUNTER
Requesting one of the covered medication/ doses be ordered to the pended pharmacy.     Amalia Warren BSN, RN

## 2023-10-31 RX ORDER — SILDENAFIL 100 MG/1
50-100 TABLET, FILM COATED ORAL DAILY PRN
Qty: 6 TABLET | Refills: 4 | Status: SHIPPED | OUTPATIENT
Start: 2023-10-31

## 2023-12-11 ENCOUNTER — PATIENT OUTREACH (OUTPATIENT)
Dept: GASTROENTEROLOGY | Facility: CLINIC | Age: 53
End: 2023-12-11
Payer: COMMERCIAL

## 2024-01-18 ENCOUNTER — TELEPHONE (OUTPATIENT)
Dept: OTOLARYNGOLOGY | Facility: CLINIC | Age: 54
End: 2024-01-18
Payer: COMMERCIAL

## 2024-04-10 NOTE — PROGRESS NOTES
[facility]  Department of Neurosurgery  [facility]      Name: Fortino Polo  : 1970  Referring provider: Shirley Zuniga  2024     Reason for visit: Right vestibular schwannoma s/p SRS 3/30/2023, follow up visit    Dear Dr. Rhoades,     It was a pleasure to see Mr. Polo back in the [facility] today in follow up.  As you recall, Mr. Polo is a 32 year old right handed male who presented with right-sided tinnitus, right otalgia, and aural fullness. 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  He is seen in conjunction with my colleague in neurotology, Dr. Shirley Zuniga. Surveillance imaging since  showed growth, so he underwent SRS on 3/30/2023. He returns for scheduled followup. He had some ear pain that is improving. He has also noticed some transient facial paresthesias after running 7 miles that improves with time. Otherwise he is doing well.     Review of Systems:   Pertinent items are noted in HPI, remainder of complete ROS is negative.      Physical Exam:   General: No acute distress.   Head: No signs of trauma.    Eyes: Conjunctivae are normal.  Mouth/Throat: Oropharynx moist.  Neck: Normal range of motion.    Resp: No respiratory distress.   MSK: Moves all extremities.  No obvious deformity.  Neuro: The patient is alert and interactive. Speech normal.  Pupils are equal. EOMI. Facial nerve function is normal.  Facial sensation is normal.  Psych: Normal mood and affect. Behavior is normal.      Audiogram:  We reviewed the results of the audiogram today which shows:  Right PTA 58 dB, WRS 28 % at 90 dB and 52% at 80 dB  Left PTA 4 dB,  % at 50 dB    Imaging:  We reviewed the MRI from 2024 and compared this to the MRI from the day of radiosurgery on 3/30/2023, which shows a stable to slightly decreased size of the right vestibular  schwannoma.    Assessment:  1.  Right vestibular schwannoma s/p SRS 3/30/2023  2.  AAO-HNS hearing class D on the right    Plan:   We reviewed the imaging findings and audiogram and we are pleased with the result.  We would like to see him back in 1 year with an MRI and audiogram.  We encouraged him to contact us should any questions or concerns arise in advance of his next appointment.      It has been a pleasure to participate in the care of your patient. Please feel free to contact us if we may be of any assistance for Mr. Polo.     Zbigniew Hunter MD    25 minutes spent on the date of the encounter doing chart review, review of outside records, review of test results, interpretation of tests, patient visit, documentation and discussion with other provider(s)

## 2024-04-11 DIAGNOSIS — D33.3 ACOUSTIC NEUROMA (H): Primary | ICD-10-CM

## 2024-04-15 NOTE — PROGRESS NOTES
"  [facility]      Name: Fortino Polo  MRN: [id number]  Age: 53 year old  : 2024      Chief Complaint:   Follow up: Acoustic neuroma (H)     History of Present Illness:   Fortino Polo is a 53 year old male with a history of a right vestibular schwannoma with right normal to profound downsloping sensorineural hearing loss who was seen in the [facility] for follow up regarding the acoustic neuroma. He is seen in conjunction with my colleague in neurosurgery, Dr. Zbigniew Hunter. He has hearing in his right ear but his speech recognition has declined in the past year. His last office visit was on 2022, where it was established that the tumor had slightly grown in size. We had discussed treatment and surgical options. He decided to consider his options before proceeding with a plan.    Today, he says that he is doing well overall. He says that he is experiencing fullness and decreased hearing in his right ear. His tinnitus and imbalance have been stable. He says that the fullness began around Easter. He denies drainage. He reported that significant wax was removed for his audiology exam. He has been having some sharp pain in his right ear, that has been lasting for a couple of minutes.     Patient had a STEREOTACTIC RADIO SURGERY (2023) for Right vestibular schwannoma         Review of Systems:   Pertinent items are noted in HPI or as in patient entered ROS below, remainder of complete ROS is negative.       2024    11:11 PM    ENT ROS   Ears, Nose, Throat Hearing loss    Ear pain    Ringing/noise in ears         Physical Exam:   BP (!) 152/94   Pulse 87   Temp 98.4  F (36.9  C)   Ht 1.854 m (6' 1\")   Wt 98 kg (216 lb)   SpO2 94%   BMI 28.50 kg/m       Constitutional:  The patient was unaccompanied, well-groomed, and in no acute distress.     Skin: Normal:  warm and pink without rash   Neurologic: " Alert and oriented x 3.  CN's III-XII within normal limits.  Voice normal.    Psychiatric: The patient's affect was calm, cooperative, and appropriate.     Communication:  Normal; communicates verbally, normal voice quality.   Respiratory: Breathing comfortably without stridor or exertion of accessory muscles.    Head/Face:  No lesions or scars. No sinus tenderness.     Eyes: Pupils were equal and reactive.  Extraocular movement intact.     Ears: Pinnae and tragus non-tender.       Right ear:  The canal was lined with healthy skin. The tympanic membrane appeared healthy and intact without retraction, effusion, or inflammation.   Left ear:    The canal was lined with healthy skin. The tympanic membrane appeared healthy and intact without retraction, effusion, or inflammation.       Audiogram: (If scheduled for today, 04/16/2024; otherwise, delete.)  AUDIOGRAM: He underwent an audiogram today. This demonstrated:  Right ear cleaning performed by ENT prior to testing. Tymps: WNL. Reflexes: WNL in all conditions (an unexpected finding as  previous reflexes in the right ear were absent). Audio: Essentially normal hearing sloping to a moderate SNHL at 8 kHz only in the left ear and normal sloping to severe SNHL in the right ear. Word rec: Right: 52% (previously 84%, significant decrease), Left: 100% (stable). Hearing in the right ear worsened 10-35 dB from 250-4000 Hz in the right ear and the let ear worsened 20 dB at 8 kHz only re: 12/13/22 audio.        Right: Speech reception threshold is 50 dB with 28% (90 dB), 52% (80 dB), and 40% (70 dB) word recognition. Tympanogram A type   Left: Speech reception threshold is 10 dB with 100% word recognition. Tympanogram A type     AUDIOGRAM: He underwent an audiogram 12/13/2022. This demonstrated:  Cerumen bilaterally that was not occuding and could visual both tympanic membranes so proceeded with testing. Right normal hearing sloping to mild to severe sensorineural hearing loss -  re: 9/14/21 decreased 10-20 dB at 500-2000 Hz and 6000-800 Hz. Word rec was poorer with higher presentation level but at lower level was stable compared to last year. Left normal hearing except at 8000 Hz were there is a mild presumed sensorineural hearing loss - stable re: 9/14/21  Tympanograms normal in both ears  Acoustic reflexes: Right ipsi and right contra absent, Left ipsi and left contra present at normal levels      Right: Speech reception threshold is 15 dB with 64% (75 dB HL) & 84% (65 dB HL) word recognition. Tympanogram A type   Left: Speech reception threshold is 0 dB with 100% word recognition. Tympanogram A type     Audiogram was independently reviewed    Imaging:   MR BRAIN W/O & W CONTRAST 4/16/2024  FINDINGS:    Right-sided vestibular schwannoma which largely effaces the CSF within the right internal auditory canal and extends into the  cerebellopontine angle measures 19 x 13 mm (series 23, image 41), previously measuring 23 x 15 mm on the 3/30/2023 exam when measured similarly. The right 7th and 8th cranial nerves are not well visualized.     No abnormal signal or enhancement along the course of the left seventh or eighth cranial nerves. Vestibular and auditory structures exhibit normal signal on T2-weighted images and no abnormal enhancement.     Images of the whole brain demonstrate no mass lesion, no mass effect, or midline shift. Multiple, T2 hyperintense lesions scattered throughout the subcortical and periventricular white matter (which display T2 shine through on the diffusion-weighted sequences) are not significantly changed in degree.     No acute intracranial hemorrhage on susceptibility-weighted images. There is no restricted diffusion. Ventricles are proportionate to the cerebral sulci. No abnormal enhancement.                                                                    IMPRESSION:    1. Minimally decreased size of the treated right vestibular schwannoma compared with previous  MRI from 3/30/2023.  2. Stable degree of presumable leukoaraiosis.     MRI images were independently reviewed.     Assessment and Plan:  Fortino Polo is a 53 year old male presenting for follow up of right side vestibular schwannoma after SRS.  His audiogram is showing that he has asymmetric hearing loss as expected and this would cause fullness; the MRI shows a right CPA mass that has decreased in size since SRS which is encouraging. We will continue with the annual follow-ups and will be happy to see him sooner if any concerns arise in the meantime.    Follow-up: 1 year follow-up with MRI and audiogram       Scribe Disclosure:   I, Ingrid Soares, am serving as a scribe; to document services personally performed by Shirley Zuniga MD -based on data collection and the provider's statements to me.     Provider Disclosure:  I agree with above History, Review of Systems, Physical exam and Plan.  I have reviewed the content of the documentation and have edited it as needed. I have personally performed the services documented here and the documentation accurately represents those services and the decisions I have made.      Electronically signed by:  Shirley Zuniga MD  Otology & Neurotology  [facility]

## 2024-04-16 ENCOUNTER — OFFICE VISIT (OUTPATIENT)
Dept: OTOLARYNGOLOGY | Facility: CLINIC | Age: 54
End: 2024-04-16
Payer: COMMERCIAL

## 2024-04-16 ENCOUNTER — OFFICE VISIT (OUTPATIENT)
Dept: AUDIOLOGY | Facility: CLINIC | Age: 54
End: 2024-04-16
Payer: COMMERCIAL

## 2024-04-16 ENCOUNTER — HOSPITAL ENCOUNTER (OUTPATIENT)
Dept: MRI IMAGING | Facility: CLINIC | Age: 54
Discharge: HOME | End: 2024-04-16
Attending: OTOLARYNGOLOGY | Admitting: OTOLARYNGOLOGY
Payer: COMMERCIAL

## 2024-04-16 VITALS
WEIGHT: 216 LBS | DIASTOLIC BLOOD PRESSURE: 94 MMHG | OXYGEN SATURATION: 94 % | TEMPERATURE: 98.4 F | BODY MASS INDEX: 28.63 KG/M2 | HEIGHT: 73 IN | SYSTOLIC BLOOD PRESSURE: 152 MMHG | HEART RATE: 87 BPM

## 2024-04-16 DIAGNOSIS — H90.3 SENSORINEURAL HEARING LOSS (SNHL), BILATERAL: Primary | ICD-10-CM

## 2024-04-16 DIAGNOSIS — D33.3 ACOUSTIC NEUROMA (H): ICD-10-CM

## 2024-04-16 DIAGNOSIS — H90.3 ASYMMETRICAL SENSORINEURAL HEARING LOSS: ICD-10-CM

## 2024-04-16 DIAGNOSIS — D33.3 ACOUSTIC NEUROMA (H): Primary | ICD-10-CM

## 2024-04-16 DIAGNOSIS — H61.23 BILATERAL IMPACTED CERUMEN: Primary | ICD-10-CM

## 2024-04-16 PROCEDURE — A9585 GADOBUTROL INJECTION: HCPCS | Performed by: OTOLARYNGOLOGY

## 2024-04-16 PROCEDURE — 99213 OFFICE O/P EST LOW 20 MIN: CPT | Performed by: NEUROLOGICAL SURGERY

## 2024-04-16 PROCEDURE — 92557 COMPREHENSIVE HEARING TEST: CPT | Performed by: AUDIOLOGIST

## 2024-04-16 PROCEDURE — 92550 TYMPANOMETRY & REFLEX THRESH: CPT | Performed by: AUDIOLOGIST

## 2024-04-16 PROCEDURE — 99214 OFFICE O/P EST MOD 30 MIN: CPT | Performed by: OTOLARYNGOLOGY

## 2024-04-16 PROCEDURE — 69210 REMOVE IMPACTED EAR WAX UNI: CPT | Performed by: PHYSICIAN ASSISTANT

## 2024-04-16 PROCEDURE — 255N000002 HC RX 255 OP 636: Performed by: OTOLARYNGOLOGY

## 2024-04-16 PROCEDURE — 70553 MRI BRAIN STEM W/O & W/DYE: CPT

## 2024-04-16 PROCEDURE — 70553 MRI BRAIN STEM W/O & W/DYE: CPT | Mod: 26 | Performed by: STUDENT IN AN ORGANIZED HEALTH CARE EDUCATION/TRAINING PROGRAM

## 2024-04-16 RX ORDER — GADOBUTROL 604.72 MG/ML
9 INJECTION INTRAVENOUS ONCE
Status: COMPLETED | OUTPATIENT
Start: 2024-04-16 | End: 2024-04-16

## 2024-04-16 RX ADMIN — GADOBUTROL 9 ML: 604.72 INJECTION INTRAVENOUS at 10:37

## 2024-04-16 ASSESSMENT — PAIN SCALES - GENERAL: PAINLEVEL: NO PAIN (0)

## 2024-04-16 NOTE — PATIENT INSTRUCTIONS
You were seen today with Dr. Shirley Zuniga and Dr. Zbigniew Hunter. Your primary contact after today's visit is: GALO Edwards    Next steps:  Please return to clinic in one year with MRI and audiogram.         How to Contact Us  Send a [employer] message to your provider.   Call the clinic - your call will be routed appropriately.   ENT Clinic: [phone]   Neurosurgery Clinic: [phone]  To speak directly to an RN Care Coordinator:  Grace RN: [phone]  Malinda RN: [phone]    Note: We do our best to check voicemail frequently throughout the day and make every effort to return calls within 1-2 business days. For urgent matters, please use the general clinic phone numbers listed above.

## 2024-04-16 NOTE — NURSING NOTE
"Chief Complaint   Patient presents with    RECHECK     Follow up with MRI and audio     Blood pressure (!) 152/94, pulse 87, temperature 98.4  F (36.9  C), height 1.854 m (6' 1\"), weight 98 kg (216 lb), SpO2 94%.  Real Juares LPN    "

## 2024-04-16 NOTE — PROGRESS NOTES
AUDIOLOGY REPORT    SUMMARY: Audiology visit completed. See audiogram for results.      RECOMMENDATIONS: Follow-up with ENT.    Freddy Rivera, CCC-A  Clinical Audiologist  [place] #[id number]

## 2024-04-16 NOTE — LETTER
[facility]  [facility] [address] [address]  Phone: [phone]  Fax: [phone]          2024    RE:   Fortino Polo  [address] [facility] [address]      Dear Colleague,    Thank you for referring your patient, Fortino Polo, to the [facility]. Please see a copy of my visit note below.      [facility]  Department of Neurosurgery  [facility]      Name: Fortino Polo  : 1970  Referring provider: Shirley Zuniga  2024     Reason for visit: Right vestibular schwannoma s/p SRS 3/30/2023, follow up visit    Dear Dr. Rhoades,     It was a pleasure to see Mr. Polo back in the [facility] today in follow up.  As you recall, Mr. Polo is a 32 year old right handed male who presented with right-sided tinnitus, right otalgia, and aural fullness. 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  He is seen in conjunction with my colleague in neurotology, Dr. Shirley Zuniga. Surveillance imaging since  showed growth, so he underwent SRS on 3/30/2023. He returns for scheduled followup. He had some ear pain that is improving. He has also noticed some transient facial paresthesias after running 7 miles that improves with time. Otherwise he is doing well.     Review of Systems:   Pertinent items are noted in HPI, remainder of complete ROS is negative.      Physical Exam:   General: No acute distress.   Head: No signs of trauma.    Eyes: Conjunctivae are normal.  Mouth/Throat: Oropharynx moist.  Neck: Normal range of motion.    Resp: No respiratory distress.   MSK: Moves all extremities.  No obvious deformity.  Neuro: The patient is alert and interactive. Speech normal.  Pupils are equal. EOMI. Facial  nerve function is normal.  Facial sensation is normal.  Psych: Normal mood and affect. Behavior is normal.      Audiogram:  We reviewed the results of the audiogram today which shows:  Right PTA 58 dB, WRS 28 % at 90 dB and 52% at 80 dB  Left PTA 4 dB,  % at 50 dB    Imaging:  We reviewed the MRI from 4/16/2024 and compared this to the MRI from the day of radiosurgery on 3/30/2023, which shows a stable to slightly decreased size of the right vestibular schwannoma.    Assessment:  1.  Right vestibular schwannoma s/p SRS 3/30/2023  2.  AAO-HNS hearing class D on the right    Plan:   We reviewed the imaging findings and audiogram and we are pleased with the result.  We would like to see him back in 1 year with an MRI and audiogram.  We encouraged him to contact us should any questions or concerns arise in advance of his next appointment.      It has been a pleasure to participate in the care of your patient. Please feel free to contact us if we may be of any assistance for Mr. Polo.     Zbigniew Hunter MD    25 minutes spent on the date of the encounter doing chart review, review of outside records, review of test results, interpretation of tests, patient visit, documentation and discussion with other provider(s)            Again, thank you for allowing me to participate in the care of your patient.      Sincerely,    Zbigniew Hunter MD

## 2024-04-16 NOTE — PROGRESS NOTES
Assessment & Plan     Problem List Items Addressed This Visit    None  Visit Diagnoses       Bilateral impacted cerumen    -  Primary    Relevant Orders    REMOVE IMPACTED CERUMEN (Completed)            Cerumen impaction, right  Ears cleaned. Counseled on ear hygiene.      Nidia Mejia PA-C  [facility] [place]    Subjective      HPI   Fortino Polo is here for a yearly recheck of their rt side acoustic neuroma, found by audiology to have impacted cerumen requiring removal.            Review of Systems   Cerumen as above      Objective       Vitals:   There were no vitals taken for this visit.     Physical Exam     Otologic microscope exam:    Verbal consent received.    Patient's ear pathology required use of the binocular microscope for the purpose of cleaning and improving visualization in order to assure a more accurate diagnostic evaluation.    Right ear was examined under the microscope.  It was cleaned with suction and right angle hook. Once cleaned, TM visualized under microscope. Normal appearing TM, nicely aerated middle ear space.     Left ear was also examined under the microscope. It was cleaned with suction. Once cleaned, TM visualized under microscope. Normal appearing TM, nicely aerated middle ear space.

## 2024-04-16 NOTE — LETTER
2024       RE: Fortino Polo  [address] [facility] [address]     Dear Colleague,    Thank you for referring your patient, Fortino Polo, to the [facility] [place] at [facility]. Please see a copy of my visit note below.      [facility]      Name: Fortino Polo  MRN: [id number]  Age: 53 year old  : 2024      Chief Complaint:   Follow up: Acoustic neuroma (H)     History of Present Illness:   Fortino Polo is a 53 year old male with a history of a right vestibular schwannoma with right normal to profound downsloping sensorineural hearing loss who was seen in the [facility] for follow up regarding the acoustic neuroma. He is seen in conjunction with my colleague in neurosurgery, Dr. Zbigniew Hunter. He has hearing in his right ear but his speech recognition has declined in the past year. His last office visit was on 2022, where it was established that the tumor had slightly grown in size. We had discussed treatment and surgical options. He decided to consider his options before proceeding with a plan.    Today, he says that he is doing well overall. He says that he is experiencing fullness and decreased hearing in his right ear. His tinnitus and imbalance have been stable. He says that the fullness began around Easter. He denies drainage. He reported that significant wax was removed for his audiology exam. He has been having some sharp pain in his right ear, that has been lasting for a couple of minutes.     Patient had a STEREOTACTIC RADIO SURGERY (2023) for Right vestibular schwannoma         Review of Systems:   Pertinent items are noted in HPI or as in patient entered ROS below, remainder of complete ROS is negative.       2024    11:11 PM    ENT ROS   Ears, Nose, Throat Hearing loss    Ear pain     "Ringing/noise in ears         Physical Exam:   BP (!) 152/94   Pulse 87   Temp 98.4  F (36.9  C)   Ht 1.854 m (6' 1\")   Wt 98 kg (216 lb)   SpO2 94%   BMI 28.50 kg/m       Constitutional:  The patient was unaccompanied, well-groomed, and in no acute distress.     Skin: Normal:  warm and pink without rash   Neurologic: Alert and oriented x 3.  CN's III-XII within normal limits.  Voice normal.    Psychiatric: The patient's affect was calm, cooperative, and appropriate.     Communication:  Normal; communicates verbally, normal voice quality.   Respiratory: Breathing comfortably without stridor or exertion of accessory muscles.    Head/Face:  No lesions or scars. No sinus tenderness.     Eyes: Pupils were equal and reactive.  Extraocular movement intact.     Ears: Pinnae and tragus non-tender.       Right ear:  The canal was lined with healthy skin. The tympanic membrane appeared healthy and intact without retraction, effusion, or inflammation.   Left ear:    The canal was lined with healthy skin. The tympanic membrane appeared healthy and intact without retraction, effusion, or inflammation.       Audiogram: (If scheduled for today, 04/16/2024; otherwise, delete.)  AUDIOGRAM: He underwent an audiogram today. This demonstrated:  Right ear cleaning performed by ENT prior to testing. Tymps: WNL. Reflexes: WNL in all conditions (an unexpected finding as  previous reflexes in the right ear were absent). Audio: Essentially normal hearing sloping to a moderate SNHL at 8 kHz only in the left ear and normal sloping to severe SNHL in the right ear. Word rec: Right: 52% (previously 84%, significant decrease), Left: 100% (stable). Hearing in the right ear worsened 10-35 dB from 250-4000 Hz in the right ear and the let ear worsened 20 dB at 8 kHz only re: 12/13/22 audio.        Right: Speech reception threshold is 50 dB with 28% (90 dB), 52% (80 dB), and 40% (70 dB) word recognition. Tympanogram A type   Left: Speech " reception threshold is 10 dB with 100% word recognition. Tympanogram A type     AUDIOGRAM: He underwent an audiogram 12/13/2022. This demonstrated:  Cerumen bilaterally that was not occuding and could visual both tympanic membranes so proceeded with testing. Right normal hearing sloping to mild to severe sensorineural hearing loss - re: 9/14/21 decreased 10-20 dB at 500-2000 Hz and 6000-800 Hz. Word rec was poorer with higher presentation level but at lower level was stable compared to last year. Left normal hearing except at 8000 Hz were there is a mild presumed sensorineural hearing loss - stable re: 9/14/21  Tympanograms normal in both ears  Acoustic reflexes: Right ipsi and right contra absent, Left ipsi and left contra present at normal levels      Right: Speech reception threshold is 15 dB with 64% (75 dB HL) & 84% (65 dB HL) word recognition. Tympanogram A type   Left: Speech reception threshold is 0 dB with 100% word recognition. Tympanogram A type     Audiogram was independently reviewed    Imaging:   MR BRAIN W/O & W CONTRAST 4/16/2024  FINDINGS:    Right-sided vestibular schwannoma which largely effaces the CSF within the right internal auditory canal and extends into the  cerebellopontine angle measures 19 x 13 mm (series 23, image 41), previously measuring 23 x 15 mm on the 3/30/2023 exam when measured similarly. The right 7th and 8th cranial nerves are not well visualized.     No abnormal signal or enhancement along the course of the left seventh or eighth cranial nerves. Vestibular and auditory structures exhibit normal signal on T2-weighted images and no abnormal enhancement.     Images of the whole brain demonstrate no mass lesion, no mass effect, or midline shift. Multiple, T2 hyperintense lesions scattered throughout the subcortical and periventricular white matter (which display T2 shine through on the diffusion-weighted sequences) are not significantly changed in degree.     No acute  intracranial hemorrhage on susceptibility-weighted images. There is no restricted diffusion. Ventricles are proportionate to the cerebral sulci. No abnormal enhancement.                                                                    IMPRESSION:    1. Minimally decreased size of the treated right vestibular schwannoma compared with previous MRI from 3/30/2023.  2. Stable degree of presumable leukoaraiosis.     MRI images were independently reviewed.     Assessment and Plan:  Fortino Polo is a 53 year old male presenting for follow up of right side vestibular schwannoma after SRS.  His audiogram is showing that he has asymmetric hearing loss as expected and this would cause fullness; the MRI shows a right CPA mass that has decreased in size since SRS which is encouraging. We will continue with the annual follow-ups and will be happy to see him sooner if any concerns arise in the meantime.    Follow-up: 1 year follow-up with MRI and audiogram       Scribe Disclosure:   I, Ingrid Soares, am serving as a scribe; to document services personally performed by Shirley Zuniga MD -based on data collection and the provider's statements to me.     Provider Disclosure:  I agree with above History, Review of Systems, Physical exam and Plan.  I have reviewed the content of the documentation and have edited it as needed. I have personally performed the services documented here and the documentation accurately represents those services and the decisions I have made.      Electronically signed by:  Shirley Zuniga MD  Otology & Neurotology  [facility]

## 2024-04-16 NOTE — LETTER
2024       RE: Fortino Polo  [address] [address] [address]     Dear Colleague,    Thank you for referring your patient, Fortino Polo, to the [facility] EAR NOSE AND THROAT CLINIC [place] at [facility]. Please see a copy of my visit note below.    [facility]  Department of Neurosurgery  [facility] Pit[facility]      Name: Fortino Polo  : 1970  Referring provider: Shirley Zuniga  2024     Reason for visit: Right vestibular schwannoma s/p SRS 3/30/2023, follow up visit    Dear Dr. Rhoades,     It was a pleasure to see Mr. Polo back in the [facility] today in follow up.  As you recall, Mr. Polo is a 32 year old right handed male who presented with right-sided tinnitus, right otalgia, and aural fullness. 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  He is seen in conjunction with my colleague in neurotology, Dr. Shirley Zuniga. Surveillance imaging since  showed growth, so he underwent SRS on 3/30/2023. He returns for scheduled followup. He had some ear pain that is improving. He has also noticed some transient facial paresthesias after running 7 miles that improves with time. Otherwise he is doing well.     Review of Systems:   Pertinent items are noted in HPI, remainder of complete ROS is negative.      Physical Exam:   General: No acute distress.   Head: No signs of trauma.    Eyes: Conjunctivae are normal.  Mouth/Throat: Oropharynx moist.  Neck: Normal range of motion.    Resp: No respiratory distress.   MSK: Moves all extremities.  No obvious deformity.  Neuro: The patient is alert and interactive. Speech normal.  Pupils are equal. EOMI. Facial nerve function is normal.  Facial sensation is normal.  Psych: Normal mood and affect. Behavior is normal.      Audiogram:  We reviewed the  results of the audiogram today which shows:  Right PTA 58 dB, WRS 28 % at 90 dB and 52% at 80 dB  Left PTA 4 dB,  % at 50 dB    Imaging:  We reviewed the MRI from 4/16/2024 and compared this to the MRI from the day of radiosurgery on 3/30/2023, which shows a stable to slightly decreased size of the right vestibular schwannoma.    Assessment:  1.  Right vestibular schwannoma s/p SRS 3/30/2023  2.  AAO-HNS hearing class D on the right    Plan:   We reviewed the imaging findings and audiogram and we are pleased with the result.  We would like to see him back in 1 year with an MRI and audiogram.  We encouraged him to contact us should any questions or concerns arise in advance of his next appointment.      It has been a pleasure to participate in the care of your patient. Please feel free to contact us if we may be of any assistance for Mr. Polo.     Zbigniew Hunter MD    25 minutes spent on the date of the encounter doing chart review, review of outside records, review of test results, interpretation of tests, patient visit, documentation and discussion with other provider(s)            Again, thank you for allowing me to participate in the care of your patient.      Sincerely,    Zbigniew Hunter MD

## 2024-04-16 NOTE — LETTER
4/16/2024       RE: Fortino Polo  [address] [address] [address] [facility] [address]     Dear Colleague,    Thank you for referring your patient, Fortino Polo, to the [facility] EAR NOSE AND THROAT CLINIC [place] at [facility]. Please see a copy of my visit note below.          Assessment & Plan    Problem List Items Addressed This Visit    None  Visit Diagnoses       Bilateral impacted cerumen    -  Primary    Relevant Orders    REMOVE IMPACTED CERUMEN (Completed)            Cerumen impaction, right  Ears cleaned. Counseled on ear hygiene.      Nidia Mejia PA-C  [facility] EAR NOSE AND THROAT CLINIC [place]    Subjective      HPI   Fortino Polo is here for a yearly recheck of their rt side acoustic neuroma, found by audiology to have impacted cerumen requiring removal.            Review of Systems   Cerumen as above      Objective       Vitals:   There were no vitals taken for this visit.     Physical Exam     Otologic microscope exam:    Verbal consent received.    Patient's ear pathology required use of the binocular microscope for the purpose of cleaning and improving visualization in order to assure a more accurate diagnostic evaluation.    Right ear was examined under the microscope.  It was cleaned with suction and right angle hook. Once cleaned, TM visualized under microscope. Normal appearing TM, nicely aerated middle ear space.     Left ear was also examined under the microscope. It was cleaned with suction. Once cleaned, TM visualized under microscope. Normal appearing TM, nicely aerated middle ear space.            Again, thank you for allowing me to participate in the care of your patient.      Sincerely,    Nidia Mejia PA-C

## 2024-05-20 DIAGNOSIS — E78.00 HIGH CHOLESTEROL: ICD-10-CM

## 2024-05-20 RX ORDER — ATORVASTATIN CALCIUM 20 MG/1
20 TABLET, FILM COATED ORAL DAILY
Qty: 90 TABLET | Refills: 0 | Status: SHIPPED | OUTPATIENT
Start: 2024-05-20 | End: 2024-08-20

## 2024-08-18 DIAGNOSIS — E78.00 HIGH CHOLESTEROL: ICD-10-CM

## 2024-08-20 RX ORDER — ATORVASTATIN CALCIUM 20 MG/1
20 TABLET, FILM COATED ORAL DAILY
Qty: 90 TABLET | Refills: 0 | Status: SHIPPED | OUTPATIENT
Start: 2024-08-20

## 2024-09-05 ENCOUNTER — PATIENT OUTREACH (OUTPATIENT)
Dept: CARE COORDINATION | Facility: CLINIC | Age: 54
End: 2024-09-05
Payer: COMMERCIAL

## 2024-09-19 ENCOUNTER — PATIENT OUTREACH (OUTPATIENT)
Dept: CARE COORDINATION | Facility: CLINIC | Age: 54
End: 2024-09-19
Payer: COMMERCIAL

## 2024-10-28 ENCOUNTER — PATIENT OUTREACH (OUTPATIENT)
Dept: GASTROENTEROLOGY | Facility: CLINIC | Age: 54
End: 2024-10-28
Payer: COMMERCIAL

## 2024-10-28 DIAGNOSIS — Z12.11 SPECIAL SCREENING FOR MALIGNANT NEOPLASMS, COLON: Primary | ICD-10-CM

## 2024-10-28 NOTE — PROGRESS NOTES
"CRC Screening Colonoscopy Referral Review    Patient meets the inclusion criteria for screening colonoscopy standing order.    Ordering/Referring Provider:  Zbigniew Garcia      BMI: Estimated body mass index is 28.5 kg/m  as calculated from the following:    Height as of 4/16/24: 1.854 m (6' 1\").    Weight as of 4/16/24: 98 kg (216 lb).     Sedation:  Does patient have any of the following conditions affecting sedation?  No medical conditions affecting sedation.    Previous Scopes:  Any previous recommendations or follow up needs based on previous scope?  na / No recommendations.    Medical Concerns to Postpone Order:  Does patient have any of the following medical concerns that should postpone/delay colonoscopy referral?  No medical conditions affecting colonoscopy referral.    Final Referral Details:  Based on patient's medical history patient is appropriate for referral order with moderate sedation. If patient's BMI > 50 do not schedule in [facility].  "

## 2024-11-05 ENCOUNTER — TELEPHONE (OUTPATIENT)
Dept: GASTROENTEROLOGY | Facility: CLINIC | Age: 54
End: 2024-11-05
Payer: COMMERCIAL

## 2024-11-05 NOTE — TELEPHONE ENCOUNTER
"Endoscopy Scheduling Screen    Have you had any respiratory illness or flu-like symptoms in the last 10 days?  No    What is your communication preference for Instructions and/or Bowel Prep?   MyChart    What insurance is in the chart?  Other:  [facility]    Ordering/Referring Provider: TUSHAR COLBERT    (If ordering provider performs procedure, schedule with ordering provider unless otherwise instructed. )    BMI: Estimated body mass index is 28.5 kg/m  as calculated from the following:    Height as of 4/16/24: 1.854 m (6' 1\").    Weight as of 4/16/24: 98 kg (216 lb).     Sedation Ordered  moderate sedation.   If patient BMI > 50 do not schedule in [facility].    If patient BMI > 45 do not schedule at [facility].    Are you taking methadone or Suboxone?  NO, No RN review required.    Have you been diagnosed and are being treated for severe PTSD or severe anxiety?  NO, No RN review required.    Are you taking any prescription medications for pain 3 or more times per week?   NO, No RN review required.    Do you have a history of malignant hyperthermia?  No    (Females) Are you currently pregnant?        Have you been diagnosed or told you have pulmonary hypertension?   No    Do you have an LVAD?  No    Have you been told you have moderate to severe sleep apnea?  No.    Have you been told you have COPD, asthma, or any other lung disease?  No    Do you have any heart conditions?  No     Have you ever had or are you waiting for an organ transplant?  No. Continue scheduling, no site restrictions.    Have you had a stroke or transient ischemic attack (TIA aka \"mini stroke\" in the last 6 months?   No    Have you been diagnosed with or been told you have cirrhosis of the liver?   No.    Are you currently on dialysis?   No    Do you need assistance transferring?   No    BMI: Estimated body mass index is 28.5 kg/m  as calculated from the following:    Height as of 4/16/24: 1.854 m (6' 1\").    Weight as of 4/16/24: 98 kg (216 lb).     Is " patients BMI > 40 and scheduling location [facility]?  No    Do you take an injectable or oral medication for weight loss or diabetes (excluding insulin)?  No    Do you take the medication Naltrexone?  No    Do you take blood thinners?  No       Prep   Are you currently on dialysis or do you have chronic kidney disease?  No    Do you have a diagnosis of diabetes?  No    Do you have a diagnosis of cystic fibrosis (CF)?  No    On a regular basis do you go 3 -5 days between bowel movements?  No    BMI > 40?  No    Preferred Pharmacy:    [id number] [place] [facility] [address] [facility]  [address] [facility] [address]  Phone: [phone] Fax: [phone]      Final Scheduling Details     Procedure scheduled  Colonoscopy    Surgeon:  Dillon     Date of procedure:  12/9     Pre-OP / PAC:   No - Not required for this site.    Location  [facility] - [facility] - Patient preference.    Sedation   Moderate Sedation - Per order.      Patient Reminders:   You will receive a call from a Nurse to review instructions and health history.  This assessment must be completed prior to your procedure.  Failure to complete the Nurse assessment may result in the procedure being cancelled.      On the day of your procedure, please designate an adult(s) who can drive you home stay with you for the next 24 hours. The medicines used in the exam will make you sleepy. You will not be able to drive.      You cannot take public transportation, ride share services, or non-medical taxi service without a responsible caregiver.  Medical transport services are allowed with the requirement that a responsible caregiver will receive you at your destination.  We require that drivers and caregivers are confirmed prior to your procedure.

## 2024-11-10 DIAGNOSIS — E78.00 HIGH CHOLESTEROL: ICD-10-CM

## 2024-11-12 RX ORDER — ATORVASTATIN CALCIUM 20 MG/1
TABLET, FILM COATED ORAL
Qty: 90 TABLET | Refills: 0 | OUTPATIENT
Start: 2024-11-12

## 2024-11-16 ENCOUNTER — HEALTH MAINTENANCE LETTER (OUTPATIENT)
Age: 54
End: 2024-11-16

## 2025-01-09 ENCOUNTER — TELEPHONE (OUTPATIENT)
Dept: GASTROENTEROLOGY | Facility: CLINIC | Age: 55
End: 2025-01-09
Payer: COMMERCIAL

## 2025-01-13 ENCOUNTER — HOSPITAL ENCOUNTER (OUTPATIENT)
Facility: AMBULATORY SURGERY CENTER | Age: 55
Discharge: HOME | End: 2025-01-13
Attending: INTERNAL MEDICINE | Admitting: INTERNAL MEDICINE
Payer: COMMERCIAL

## 2025-01-13 VITALS
SYSTOLIC BLOOD PRESSURE: 145 MMHG | TEMPERATURE: 97.4 F | RESPIRATION RATE: 16 BRPM | DIASTOLIC BLOOD PRESSURE: 92 MMHG | HEART RATE: 79 BPM | OXYGEN SATURATION: 98 %

## 2025-01-13 LAB — COLONOSCOPY: NORMAL

## 2025-01-13 PROCEDURE — G8918 PT W/O PREOP ORDER IV AB PRO: HCPCS

## 2025-01-13 PROCEDURE — 45378 DIAGNOSTIC COLONOSCOPY: CPT

## 2025-01-13 PROCEDURE — G8907 PT DOC NO EVENTS ON DISCHARG: HCPCS

## 2025-01-13 RX ORDER — ONDANSETRON 2 MG/ML
4 INJECTION INTRAMUSCULAR; INTRAVENOUS EVERY 6 HOURS PRN
Status: DISCONTINUED | OUTPATIENT
Start: 2025-01-13 | End: 2025-01-14 | Stop reason: HOSPADM

## 2025-01-13 RX ORDER — NALOXONE HYDROCHLORIDE 0.4 MG/ML
0.2 INJECTION, SOLUTION INTRAMUSCULAR; INTRAVENOUS; SUBCUTANEOUS
Status: DISCONTINUED | OUTPATIENT
Start: 2025-01-13 | End: 2025-01-14 | Stop reason: HOSPADM

## 2025-01-13 RX ORDER — FLUMAZENIL 0.1 MG/ML
0.2 INJECTION, SOLUTION INTRAVENOUS
Status: ACTIVE | OUTPATIENT
Start: 2025-01-13 | End: 2025-01-13

## 2025-01-13 RX ORDER — ONDANSETRON 2 MG/ML
4 INJECTION INTRAMUSCULAR; INTRAVENOUS
Status: DISCONTINUED | OUTPATIENT
Start: 2025-01-13 | End: 2025-01-14 | Stop reason: HOSPADM

## 2025-01-13 RX ORDER — NALOXONE HYDROCHLORIDE 0.4 MG/ML
0.4 INJECTION, SOLUTION INTRAMUSCULAR; INTRAVENOUS; SUBCUTANEOUS
Status: DISCONTINUED | OUTPATIENT
Start: 2025-01-13 | End: 2025-01-14 | Stop reason: HOSPADM

## 2025-01-13 RX ORDER — LIDOCAINE 40 MG/G
CREAM TOPICAL
Status: DISCONTINUED | OUTPATIENT
Start: 2025-01-13 | End: 2025-01-14 | Stop reason: HOSPADM

## 2025-01-13 RX ORDER — PROCHLORPERAZINE MALEATE 10 MG
10 TABLET ORAL EVERY 6 HOURS PRN
Status: DISCONTINUED | OUTPATIENT
Start: 2025-01-13 | End: 2025-01-14 | Stop reason: HOSPADM

## 2025-01-13 RX ORDER — FENTANYL CITRATE 50 UG/ML
INJECTION, SOLUTION INTRAMUSCULAR; INTRAVENOUS PRN
Status: DISCONTINUED | OUTPATIENT
Start: 2025-01-13 | End: 2025-01-13 | Stop reason: HOSPADM

## 2025-01-13 RX ORDER — ONDANSETRON 4 MG/1
4 TABLET, ORALLY DISINTEGRATING ORAL EVERY 6 HOURS PRN
Status: DISCONTINUED | OUTPATIENT
Start: 2025-01-13 | End: 2025-01-14 | Stop reason: HOSPADM

## 2025-01-13 NOTE — H&P
ENDOSCOPY PRE-SEDATION H&P FOR OUTPATIENT PROCEDURES    Fortino Polo  [id number]  1970    Procedure: colonoscopy    Pre-procedure diagnosis: hx polyps    Past medical history:   Past Medical History:   Diagnosis Date    Acoustic neuroma (H)     Condyloma     Dyslipidemia     Hypertension March 2021    Pilonidal cyst        Past surgical history:   Past Surgical History:   Procedure Laterality Date    COLONOSCOPY N/A 7/21/2022    Procedure: COLONOSCOPY, FLEXIBLE, WITH LESION REMOVAL USING SNARE;  Surgeon: Amrit Carranza MD;  Location: [facility] OR    COLONOSCOPY N/A 1/26/2023    Procedure: COLONOSCOPY, FLEXIBLE, WITH LESION REMOVAL USING SNARE;  Surgeon: Amrit Carranza MD;  Location: [facility] OR    COLONOSCOPY WITH CO2 INSUFFLATION N/A 7/21/2022    Procedure: COLONOSCOPY, WITH CO2 INSUFFLATION;  Surgeon: Amrit Carranza MD;  Location: [facility] OR    COLONOSCOPY WITH CO2 INSUFFLATION N/A 1/26/2023    Procedure: COLONOSCOPY, WITH CO2 INSUFFLATION;  Surgeon: Amrit Carranza MD;  Location: [facility] OR    DRAIN PILONIDAL CYST SIMPL      REMOVAL OF NAIL PLATE         Current Outpatient Medications   Medication Sig Dispense Refill    atorvastatin (LIPITOR) 20 MG tablet Take 1 tablet (20 mg) by mouth daily. Please advise pt they are due for an appointment with their provider for further refills. 90 tablet 0    Magnesium Oxide 250 MG TABS Take 250 mg by mouth daily.      sildenafil (VIAGRA) 100 MG tablet Take 0.5-1 tablets ( mg) by mouth daily as needed (ED) 6 tablet 4     Current Facility-Administered Medications   Medication Dose Route Frequency Provider Last Rate Last Admin    lidocaine (LMX4) kit   Topical Q1H PRN Amrit Carranza MD        lidocaine 1 % 0.1-1 mL  0.1-1 mL Other Q1H PRN Amrit Carranza MD        ondansetron (ZOFRAN) injection 4 mg  4 mg Intravenous Once PRN Amrit Carranza MD        sodium chloride (PF) 0.9% PF flush 3 mL  3 mL  Intracatheter Q8H Amrit Carranza MD        sodium chloride (PF) 0.9% PF flush 3 mL  3 mL Intracatheter q1 min prn Amrit Carranza MD           Allergies   Allergen Reactions    Nkda [No Known Drug Allergy]     Seasonal Allergies        History of Anesthesia/Sedation Problems: no    PHYSICAL EXAMINATION:  Constitutional: aaox3, cooperative, pleasant  Vitals reviewed: BP (!) 146/85   Temp 97.5  F (36.4  C) (Temporal)   Resp 16   SpO2 95%   Wt:   Wt Readings from Last 2 Encounters:   04/16/24 98 kg (216 lb)   10/05/23 95.1 kg (209 lb 9.6 oz)      Eyes: Sclera anicteric/injected  Ears/nose/mouth/throat: Normal oropharynx without ulcers or exudate, mucus membranes moist, hearing intact  Neck: supple, thyroid normal size  CV: No edema  Respiratory: Unlabored breathing  Lymph: No submandibular, supraclavicular or inguinal lymphadenopathy  Abd: Nondistended, no masses, nontender  Skin: warm, perfused, no jaundice  Psych: Normal affect  MSK: normal movement on limited exam.    ASA Score: See Provation note    Assessment/Plan:     The patient is an appropriate candidate to receive sedation.    Informed consent was discussed with the patient/family, including the risks, benefits, potential complications and any alternative options associated with sedation.    Patient assessment completed just prior to sedation and while under constant observation by the provider. Condition determined to be adequate for proceeding with sedation.    The specific risks for the procedure were discussed with the patient at the time of informed consent and include but are not limited to perforation which could require surgery, missing significant neoplasm or lesion, hemorrhage and adverse sedative complication.      Amrit Carranza MD

## 2025-02-17 ENCOUNTER — TELEPHONE (OUTPATIENT)
Dept: NEUROSURGERY | Facility: CLINIC | Age: 55
End: 2025-02-17
Payer: COMMERCIAL

## 2025-02-17 NOTE — TELEPHONE ENCOUNTER
LVM to schedule RTN crani with Dr Hunetr in April after MRI. Audio at any [facility]      Gave direct number

## 2025-02-20 ENCOUNTER — MYC REFILL (OUTPATIENT)
Dept: FAMILY MEDICINE | Facility: CLINIC | Age: 55
End: 2025-02-20
Payer: COMMERCIAL

## 2025-02-20 DIAGNOSIS — E78.00 HIGH CHOLESTEROL: ICD-10-CM

## 2025-02-20 RX ORDER — ATORVASTATIN CALCIUM 20 MG/1
20 TABLET, FILM COATED ORAL DAILY
Qty: 90 TABLET | Refills: 0 | Status: SHIPPED | OUTPATIENT
Start: 2025-02-20

## 2025-02-20 NOTE — LETTER
February 20, 2025    Fortino Polo  [address] [address] [address]    Dear Fortino,       We recently received a refill request for atorvastatin (LIPITOR) 20 MG tablet.  We have refilled this for a one time 90 day supply only because you are due for a:    Medication Follow Up office visit      Please call at your earliest convenience so that there will not be a delay with your future refills.          Thank you,   Your [facility] Team/[place]  [phone]          Electronically signed         Odomzo Counseling- I discussed with the patient the risks of Odomzo including but not limited to nausea, vomiting, diarrhea, constipation, weight loss, changes in the sense of taste, decreased appetite, muscle spasms, and hair loss.  The patient verbalized understanding of the proper use and possible adverse effects of Odomzo.  All of the patient's questions and concerns were addressed.

## 2025-02-20 NOTE — TELEPHONE ENCOUNTER
Called pt back and LVM to schedule RTN  Crani video with Elsa with Audio (any [facility] clinic)  prior      Gave direct number

## 2025-04-03 ENCOUNTER — PATIENT OUTREACH (OUTPATIENT)
Dept: CARE COORDINATION | Facility: CLINIC | Age: 55
End: 2025-04-03
Payer: COMMERCIAL

## 2025-04-15 NOTE — PROGRESS NOTES
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 Rhoades,     It was a pleasure to see Mr. Polo back in the [facility] for [facility] Pit[facility] Surgery today in follow up.  As you recall, Mr. Polo is a 54 year old right handed male who presented with right-sided tinnitus, right otalgia, and aural fullness. 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 Surveillance imaging since  showed growth, so he underwent SRS on 3/30/2023. He returns for scheduled followup. He is planning a [place] trip with his youngest son.  Balance has been good, though in the morning he notices brief issues but these recover quickly.  He does notice some hearing loss in the ear, but not a significant one. He has a hearing device but does not wear it.    Review of Systems:   Pertinent items are noted in HPI, remainder of complete ROS is negative.      Physical Exam:   General: No acute distress.   Head: No signs of trauma.    Eyes: Conjunctivae are normal.  Mouth/Throat: Oropharynx moist.  Neck: Normal range of motion.    Resp: No respiratory distress.   MSK: Moves all extremities.  No obvious deformity.  Neuro: The patient is alert and interactive. Speech normal.  Pupils are equal. EOMI. Facial nerve function is normal.  Facial sensation is normal.  Psych: Normal mood and affect. Behavior is normal.      Audiogram:  We reviewed the results of the audiogram 2025 which shows:  Right PTA 50 dB, WRS 40 % at 80 dB  Left PTA -2 dB,  % at 50 dB    Imaging:  We reviewed the MRI from 2025 and compared this to the MRI from the day of radiosurgery on 3/30/2023, which shows a stable to slightly decreased size of the right vestibular schwannoma.    Assessment:  1.  Right vestibular  schwannoma s/p [facility] 3/30/2023  2.  AAO-HNS hearing class D on the right    Plan:   We reviewed the imaging findings and audiogram and we are pleased with the result.  We would like to see him back in 1 year with an MRI and audiogram.  We encouraged him to contact us should any questions or concerns arise in advance of his next appointment.      It has been a pleasure to participate in the care of your patient. Please feel free to contact us if we may be of any assistance for Mr. Polo.     Zbigniew Hunter MD    15 minutes spent on the date of the encounter doing chart review, review of outside records, review of test results, interpretation of tests, patient visit, documentation and discussion with other provider(s)        Video start: 2:23pm  Video end: 2:31pm

## 2025-04-17 ENCOUNTER — ANCILLARY PROCEDURE (OUTPATIENT)
Dept: MRI IMAGING | Facility: CLINIC | Age: 55
End: 2025-04-17
Attending: NEUROLOGICAL SURGERY
Payer: COMMERCIAL

## 2025-04-17 DIAGNOSIS — D33.3 ACOUSTIC NEUROMA (H): ICD-10-CM

## 2025-04-17 PROCEDURE — A9585 GADOBUTROL INJECTION: HCPCS | Mod: JW | Performed by: STUDENT IN AN ORGANIZED HEALTH CARE EDUCATION/TRAINING PROGRAM

## 2025-04-17 PROCEDURE — 70543 MRI ORBT/FAC/NCK W/O &W/DYE: CPT | Mod: TC | Performed by: STUDENT IN AN ORGANIZED HEALTH CARE EDUCATION/TRAINING PROGRAM

## 2025-04-17 RX ORDER — GADOBUTROL 604.72 MG/ML
9.5 INJECTION INTRAVENOUS ONCE
Status: COMPLETED | OUTPATIENT
Start: 2025-04-17 | End: 2025-04-17

## 2025-04-17 RX ADMIN — GADOBUTROL 9.5 ML: 604.72 INJECTION INTRAVENOUS at 11:38

## 2025-04-22 ENCOUNTER — VIRTUAL VISIT (OUTPATIENT)
Dept: OTOLARYNGOLOGY | Facility: CLINIC | Age: 55
End: 2025-04-22
Payer: COMMERCIAL

## 2025-04-22 VITALS — WEIGHT: 210 LBS | HEIGHT: 73 IN | BODY MASS INDEX: 27.83 KG/M2

## 2025-04-22 DIAGNOSIS — D33.3 ACOUSTIC NEUROMA (H): Primary | ICD-10-CM

## 2025-04-22 PROCEDURE — 1126F AMNT PAIN NOTED NONE PRSNT: CPT | Performed by: NEUROLOGICAL SURGERY

## 2025-04-22 PROCEDURE — 98005 SYNCH AUDIO-VIDEO EST LOW 20: CPT | Performed by: NEUROLOGICAL SURGERY

## 2025-04-22 RX ORDER — CETIRIZINE HYDROCHLORIDE 5 MG/1
5 TABLET ORAL DAILY
COMMUNITY

## 2025-04-22 ASSESSMENT — PAIN SCALES - GENERAL: PAINLEVEL_OUTOF10: NO PAIN (0)

## 2025-04-22 NOTE — LETTER
CENTER FOR SKULL BASE AND PITUITARY SURGERY  [facility] EAR NOSE AND THROAT CLINIC [address] [address]  Phone: [phone]  Fax: [phone]          2025    RE:   Fortino Polo  [address] [address] [address]      Dear Colleague,    Thank you for referring your patient, Fortino Polo, to the [facility] Pituitary Surgery. Please see a copy of my visit note below.      [facility]  Department of Neurosurgery  Center for Skull Base and Pituitary Surgery      Name: Fortino Polo  : 1970     Reason for visit: Right vestibular schwannoma s/p SRS 3/30/2023, follow up visit    Dear Dr. Rhoades,     It was a pleasure to see Mr. Polo back in the [facility] today in follow up.  As you recall, Mr. Polo is a 54 year old right handed male who presented with right-sided tinnitus, right otalgia, and aural fullness. 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 Surveillance imaging since  showed growth, so he underwent SRS on 3/30/2023. He returns for scheduled followup. He is planning a [place] trip with his youngest son.  Balance has been good, though in the morning he notices brief issues but these recover quickly.  He does notice some hearing loss in the ear, but not a significant one. He has a hearing device but does not wear it.    Review of Systems:   Pertinent items are noted in HPI, remainder of complete ROS is negative.      Physical Exam:   General: No acute distress.   Head: No signs of trauma.    Eyes: Conjunctivae are normal.  Mouth/Throat: Oropharynx moist.  Neck: Normal range of motion.    Resp: No respiratory distress.   MSK: Moves all extremities.  No obvious deformity.  Neuro: The patient is alert and interactive. Speech normal.  Pupils are equal. EOMI. Facial nerve function is normal.   Facial sensation is normal.  Psych: Normal mood and affect. Behavior is normal.      Audiogram:  We reviewed the results of the audiogram 4/18/2025 which shows:  Right PTA 50 dB, WRS 40 % at 80 dB  Left PTA -2 dB,  % at 50 dB    Imaging:  We reviewed the MRI from 4/17/2025 and compared this to the MRI from the day of radiosurgery on 3/30/2023, which shows a stable to slightly decreased size of the right vestibular schwannoma.    Assessment:  1.  Right vestibular schwannoma s/p SRS 3/30/2023  2.  AAO-HNS hearing class D on the right    Plan:   We reviewed the imaging findings and audiogram and we are pleased with the result.  We would like to see him back in 1 year with an MRI and audiogram.  We encouraged him to contact us should any questions or concerns arise in advance of his next appointment.      It has been a pleasure to participate in the care of your patient. Please feel free to contact us if we may be of any assistance for Mr. Polo.     Zbigniew Hunter MD    15 minutes spent on the date of the encounter doing chart review, review of outside records, review of test results, interpretation of tests, patient visit, documentation and discussion with other provider(s)        Video start: 2:23pm  Video end: 2:31pm      Again, thank you for allowing me to participate in the care of your patient.      Sincerely,    Zbigniew Hunter MD

## 2025-04-22 NOTE — PATIENT INSTRUCTIONS
You were seen today with Dr. Zbigniew Hunter. Your primary contact after today's visit is: GALO Edwards    Next steps:  Please return to clinic in one year with updated MRI and audiogram prior.         How to Contact Us  Send a ""t message to your provider.   Call the clinic - your call will be routed appropriately.   ENT Clinic: [phone]   Neurosurgery Clinic: [phone]  To speak directly to an RN Care Coordinator:  Grace RN: [phone]  Malinda RN: [phone]    Note: We do our best to check voicemail frequently throughout the day and make every effort to return calls within 1-2 business days. For urgent matters, please use the general clinic phone numbers listed above.

## 2025-04-22 NOTE — LETTER
2025       RE: Fortino Polo  [address] [facility] [address]     Dear Colleague,    Thank you for referring your patient, Fortino Polo, to the [facility] [place] [facility]. Please see a copy of my visit note below.    [facility]  Department of Neurosurgery  [facility]      Name: Fortino Polo  : 1970     Reason for visit: Right vestibular schwannoma s/p SRS 3/30/2023, follow up visit    Dear Dr. Rhoades,     It was a pleasure to see Mr. Polo back in the [facility] today in follow up.  As you recall, Mr. Polo is a 54 year old right handed male who presented with right-sided tinnitus, right otalgia, and aural fullness. 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 Surveillance imaging since  showed growth, so he underwent SRS on 3/30/2023. He returns for scheduled followup. He is planning a [place] trip with his youngest son.  Balance has been good, though in the morning he notices brief issues but these recover quickly.  He does notice some hearing loss in the ear, but not a significant one. He has a hearing device but does not wear it.    Review of Systems:   Pertinent items are noted in HPI, remainder of complete ROS is negative.      Physical Exam:   General: No acute distress.   Head: No signs of trauma.    Eyes: Conjunctivae are normal.  Mouth/Throat: Oropharynx moist.  Neck: Normal range of motion.    Resp: No respiratory distress.   MSK: Moves all extremities.  No obvious deformity.  Neuro: The patient is alert and interactive. Speech normal.  Pupils are equal. EOMI. Facial nerve function is normal.  Facial sensation is normal.  Psych: Normal mood and affect. Behavior is normal.      Audiogram:  We reviewed the results of the audiogram  4/18/2025 which shows:  Right PTA 50 dB, WRS 40 % at 80 dB  Left PTA -2 dB,  % at 50 dB    Imaging:  We reviewed the MRI from 4/17/2025 and compared this to the MRI from the day of radiosurgery on 3/30/2023, which shows a stable to slightly decreased size of the right vestibular schwannoma.    Assessment:  1.  Right vestibular schwannoma s/p SRS 3/30/2023  2.  AAO-HNS hearing class D on the right    Plan:   We reviewed the imaging findings and audiogram and we are pleased with the result.  We would like to see him back in 1 year with an MRI and audiogram.  We encouraged him to contact us should any questions or concerns arise in advance of his next appointment.      It has been a pleasure to participate in the care of your patient. Please feel free to contact us if we may be of any assistance for Mr. Polo.     Zbigniew Hunter MD    15 minutes spent on the date of the encounter doing chart review, review of outside records, review of test results, interpretation of tests, patient visit, documentation and discussion with other provider(s)        Video start: 2:23pm  Video end: 2:31pm      Again, thank you for allowing me to participate in the care of your patient.      Sincerely,    Zbigniew Hunter MD

## 2025-04-22 NOTE — PROGRESS NOTES
"Hector is a 54 year old who is being evaluated via a billable video visit.    How would you like to obtain your AVS? MyChart  If the video visit is dropped, the invitation should be resent by: Send to e-mail at: [email or web address]  Will anyone else be joining your video visit? No  {If patient encounters technical issues they should call [phone] :[id number]}    {PROVIDER CHARTING PREFERENCE:829026}    Subjective   Hector is a 54 year old, presenting for the following health issues:  No chief complaint on file.    HPI      ***    {ROS Picklists (Optional):186288}      Objective    Vitals - Patient Reported  Pain Score: No Pain (0)        Physical Exam   {video visit exam brief selected:870905}    {Diagnostic Test Results (Optional):459969}      Video-Visit Details    Type of service:  Video Visit   Originating Location (pt. Location): {video visit patient location:113583::\"Home\"}  {PROVIDER LOCATION On-site should be selected for visits conducted from your clinic location or adjoining [facility] hospital, academic office, or other nearby [facility] building. Off-site should be selected for all other provider locations, including home:240867}  Distant Location (provider location):  {virtual location provider:596524}  Platform used for Video Visit: {Virtual Visit Platforms:593432::\"[employer]\"}  Signed Electronically by: Zbigniew Hunter MD  {Email feedback regarding this note to [email or web address]   :501492}    "

## 2025-04-23 ENCOUNTER — PATIENT OUTREACH (OUTPATIENT)
Dept: CARE COORDINATION | Facility: CLINIC | Age: 55
End: 2025-04-23
Payer: COMMERCIAL

## 2025-06-03 ENCOUNTER — RESULTS FOLLOW-UP (OUTPATIENT)
Dept: FAMILY MEDICINE | Facility: CLINIC | Age: 55
End: 2025-06-03

## 2025-06-03 ENCOUNTER — MYC MEDICAL ADVICE (OUTPATIENT)
Dept: FAMILY MEDICINE | Facility: CLINIC | Age: 55
End: 2025-06-03
Payer: COMMERCIAL

## 2025-06-03 DIAGNOSIS — E78.00 HIGH CHOLESTEROL: ICD-10-CM

## 2025-06-03 RX ORDER — ATORVASTATIN CALCIUM 40 MG/1
40 TABLET, FILM COATED ORAL DAILY
Qty: 90 TABLET | Refills: 3 | Status: SHIPPED | OUTPATIENT
Start: 2025-06-03

## (undated) DEVICE — KIT ENDO FIRST STEP DISINFECTANT 200ML W/POUCH EP-4

## (undated) DEVICE — PAD CHUX UNDERPAD 23X24" 7136

## (undated) RX ORDER — LIDOCAINE 40 MG/G
CREAM TOPICAL
Status: DISPENSED
Start: 2023-03-30

## (undated) RX ORDER — LORAZEPAM 0.5 MG/1
TABLET ORAL
Status: DISPENSED
Start: 2023-03-30

## (undated) RX ORDER — FENTANYL CITRATE 50 UG/ML
INJECTION, SOLUTION INTRAMUSCULAR; INTRAVENOUS
Status: DISPENSED
Start: 2025-01-13

## (undated) RX ORDER — FENTANYL CITRATE 50 UG/ML
INJECTION, SOLUTION INTRAMUSCULAR; INTRAVENOUS
Status: DISPENSED
Start: 2023-01-26

## (undated) RX ORDER — FENTANYL CITRATE 50 UG/ML
INJECTION, SOLUTION INTRAMUSCULAR; INTRAVENOUS
Status: DISPENSED
Start: 2022-07-21

## (undated) RX ORDER — SIMETHICONE 40MG/0.6ML
SUSPENSION, DROPS(FINAL DOSAGE FORM)(ML) ORAL
Status: DISPENSED
Start: 2023-01-26